# Patient Record
Sex: FEMALE | Race: WHITE | NOT HISPANIC OR LATINO | Employment: OTHER | URBAN - NONMETROPOLITAN AREA
[De-identification: names, ages, dates, MRNs, and addresses within clinical notes are randomized per-mention and may not be internally consistent; named-entity substitution may affect disease eponyms.]

---

## 2017-10-09 ENCOUNTER — OFFICE VISIT - GICH (OUTPATIENT)
Dept: FAMILY MEDICINE | Facility: OTHER | Age: 37
End: 2017-10-09

## 2017-10-09 ENCOUNTER — HISTORY (OUTPATIENT)
Dept: FAMILY MEDICINE | Facility: OTHER | Age: 37
End: 2017-10-09

## 2017-10-09 DIAGNOSIS — R25.2 CRAMP AND SPASM: ICD-10-CM

## 2017-10-09 DIAGNOSIS — E55.9 VITAMIN D DEFICIENCY: ICD-10-CM

## 2017-10-09 DIAGNOSIS — Z12.4 ENCOUNTER FOR SCREENING FOR MALIGNANT NEOPLASM OF CERVIX: ICD-10-CM

## 2017-10-09 DIAGNOSIS — Z00.00 ENCOUNTER FOR GENERAL ADULT MEDICAL EXAMINATION WITHOUT ABNORMAL FINDINGS: ICD-10-CM

## 2017-10-09 DIAGNOSIS — Z31.69 ENCOUNTER FOR OTHER GENERAL COUNSELING AND ADVICE ON PROCREATION (CODE): ICD-10-CM

## 2017-10-09 LAB
ANION GAP - HISTORICAL: 5 (ref 5–18)
BUN SERPL-MCNC: 18 MG/DL (ref 7–25)
BUN/CREAT RATIO - HISTORICAL: 17
CALCIUM SERPL-MCNC: 9.5 MG/DL (ref 8.6–10.3)
CHLORIDE SERPLBLD-SCNC: 106 MMOL/L (ref 98–107)
CO2 SERPL-SCNC: 24 MMOL/L (ref 21–31)
CREAT SERPL-MCNC: 1.03 MG/DL (ref 0.7–1.3)
GFR IF NOT AFRICAN AMERICAN - HISTORICAL: 60 ML/MIN/1.73M2
GLUCOSE SERPL-MCNC: 95 MG/DL (ref 70–105)
MAGNESIUM SERPL-MCNC: 1.9 MG/DL (ref 1.9–2.7)
POTASSIUM SERPL-SCNC: 3.8 MMOL/L (ref 3.5–5.1)
SODIUM SERPL-SCNC: 135 MMOL/L (ref 133–143)
T4 FREE SERPL-MCNC: 0.78 NG/DL (ref 0.58–1.64)
TSH - HISTORICAL: 1.9 UIU/ML (ref 0.34–5.6)
VITAMIN D TOTAL - HISTORICAL: 30.1 NG/ML

## 2017-10-13 ENCOUNTER — AMBULATORY - GICH (OUTPATIENT)
Dept: SCHEDULING | Facility: OTHER | Age: 37
End: 2017-10-13

## 2017-10-18 LAB — HPV RESULTS - HISTORICAL: NEGATIVE

## 2017-10-26 ENCOUNTER — OFFICE VISIT - GICH (OUTPATIENT)
Dept: OBGYN | Facility: OTHER | Age: 37
End: 2017-10-26

## 2017-10-26 ENCOUNTER — HISTORY (OUTPATIENT)
Dept: OBGYN | Facility: OTHER | Age: 37
End: 2017-10-26

## 2017-10-26 DIAGNOSIS — N97.9 FEMALE INFERTILITY: ICD-10-CM

## 2017-10-30 LAB — Lab: 3.1 NG/ML

## 2017-11-02 ENCOUNTER — HOSPITAL ENCOUNTER (OUTPATIENT)
Dept: RADIOLOGY | Facility: OTHER | Age: 37
End: 2017-11-02

## 2017-11-02 DIAGNOSIS — N97.9 FEMALE INFERTILITY: ICD-10-CM

## 2017-11-13 ENCOUNTER — AMBULATORY - GICH (OUTPATIENT)
Dept: RADIOLOGY | Facility: OTHER | Age: 37
End: 2017-11-13

## 2017-11-13 DIAGNOSIS — M25.559 PAIN IN HIP: ICD-10-CM

## 2017-11-13 DIAGNOSIS — M54.50 LOW BACK PAIN: ICD-10-CM

## 2017-11-14 ENCOUNTER — COMMUNICATION - GICH (OUTPATIENT)
Dept: OBGYN | Facility: OTHER | Age: 37
End: 2017-11-14

## 2017-11-22 ENCOUNTER — AMBULATORY - GICH (OUTPATIENT)
Dept: OBGYN | Facility: OTHER | Age: 37
End: 2017-11-22

## 2017-11-22 ENCOUNTER — COMMUNICATION - GICH (OUTPATIENT)
Dept: OBGYN | Facility: OTHER | Age: 37
End: 2017-11-22

## 2017-11-22 DIAGNOSIS — N97.9 FEMALE INFERTILITY: ICD-10-CM

## 2017-11-27 ENCOUNTER — HOSPITAL ENCOUNTER (OUTPATIENT)
Dept: RADIOLOGY | Facility: OTHER | Age: 37
End: 2017-11-27

## 2017-11-27 DIAGNOSIS — M25.559 PAIN IN HIP: ICD-10-CM

## 2017-11-27 DIAGNOSIS — M54.50 LOW BACK PAIN: ICD-10-CM

## 2017-12-04 ENCOUNTER — AMBULATORY - GICH (OUTPATIENT)
Dept: OBGYN | Facility: OTHER | Age: 37
End: 2017-12-04

## 2017-12-04 ENCOUNTER — HOSPITAL ENCOUNTER (OUTPATIENT)
Dept: RADIOLOGY | Facility: OTHER | Age: 37
End: 2017-12-04

## 2017-12-04 DIAGNOSIS — N97.0 FEMALE INFERTILITY ASSOCIATED WITH ANOVULATION: ICD-10-CM

## 2017-12-04 DIAGNOSIS — N97.9 FEMALE INFERTILITY: ICD-10-CM

## 2017-12-05 ENCOUNTER — OFFICE VISIT - GICH (OUTPATIENT)
Dept: OBGYN | Facility: OTHER | Age: 37
End: 2017-12-05

## 2017-12-05 ENCOUNTER — HISTORY (OUTPATIENT)
Dept: OBGYN | Facility: OTHER | Age: 37
End: 2017-12-05

## 2017-12-05 DIAGNOSIS — N97.9 FEMALE INFERTILITY: ICD-10-CM

## 2017-12-27 ENCOUNTER — HISTORY (OUTPATIENT)
Dept: FAMILY MEDICINE | Facility: OTHER | Age: 37
End: 2017-12-27

## 2017-12-27 ENCOUNTER — OFFICE VISIT - GICH (OUTPATIENT)
Dept: FAMILY MEDICINE | Facility: OTHER | Age: 37
End: 2017-12-27

## 2017-12-27 ENCOUNTER — AMBULATORY - GICH (OUTPATIENT)
Dept: OBGYN | Facility: OTHER | Age: 37
End: 2017-12-27

## 2017-12-27 DIAGNOSIS — O36.80X0 PREGNANCY WITH INCONCLUSIVE FETAL VIABILITY: ICD-10-CM

## 2017-12-27 DIAGNOSIS — Z32.01 ENCOUNTER FOR PREGNANCY TEST, RESULT POSITIVE: ICD-10-CM

## 2017-12-27 DIAGNOSIS — N91.2 AMENORRHEA: ICD-10-CM

## 2017-12-27 LAB — HCG UR QL: POSITIVE

## 2017-12-27 NOTE — PROGRESS NOTES
Patient Information     Patient Name MRN Sex GIGI    Ann Lopez [id number] Female 1980      Progress Notes by Ann Aguilar at 2017  7:13 AM     Author:  Ann Aguilar Service:  (none) Author Type:  Other Clinical Staff     Filed:  2017  7:13 AM Date of Service:  2017  7:13 AM Status:  Signed     :  Ann Aguilar (Other Clinical Staff)            Falls Risk Criteria:    Age 65 and older or under age 4        Sensory deficits    Poor vision    Use of ambulatory aides    Impaired judgment    Unable to walk independently    Meets High Risk criteria for falls:  no

## 2017-12-27 NOTE — PROGRESS NOTES
Patient Information     Patient Name MRN Sex Ann Escobedo [id number] Female 1980      Progress Notes by Tanesha Johnson MD at 10/9/2017 11:15 AM     Author:  Tanesha Johnson MD Service:  (none) Author Type:  Physician     Filed:  10/9/2017  1:37 PM Encounter Date:  10/9/2017 Status:  Signed     :  Tanesha Johnson MD (Physician)            SUBJECTIVE:    Ann Lopez is a 37 y.o. female who presehts for her annual exam.  Nursing Notes:   Cristel Rea  10/9/2017 11:55 AM  Signed  Patient presents to the clinic today for a px. She has some infertility questions.    Cristel Rea ....................  10/9/2017   11:47 AM        HPI: Ann Lopez is a 37 y.o. female presents for her annual exam.    She's wondering about the next steps in regards to infertility. She was  2016. She has not ever been pregnant. When seen last year, labs were done which were unremarkable. She also underwent HSG which was normal She has light periods, last about 3 days.  Periods are pretty regular. She is taking folic acid Evaluation including labs, HSG and 's semen analysis have been normal.   She is an avid runner.  FH:  Sister with infertility, has conceived and carried up to 6 weeks.      Her hip flexors are continuing to be tight - started with marathon training.  She had seen chiropractor and PT prior to this.     Last pap:   Immunizations:  Flu vaccine declined   Mammogram at age 45  Colon cancer screening at age 50  Current birth control none  Last Lipids:  Chol: 155    3/27/2013  T    3/27/2013  HDL:   65    3/27/2013  LDL:  78    3/27/2013    PROBLEM LIST:  Patient Active Problem List     Diagnosis  Code     Vitamin D deficiency E55.9     Encounter for infertility counseling Z31.69     PAST MEDICAL HISTORY:  Past Medical History:     Diagnosis  Date     Dysmenorrhea 2006     Herpes simplex with unspecified complication 2004     genitial      Stress fracture 2000    left hip      Vitamin D deficiency 10/5/2016     SURGICAL HISTORY:  Past Surgical History:      Procedure  Laterality Date     REFRACTIVE SURGERY  2012       SOCIAL HISTORY:  Social History     Social History        Marital status:       Spouse name: Ralf     Number of children:  N/A     Years of education:  12+     Occupational History       health and dietary       Social History Main Topics          Smoking status:   Never Smoker      Smokeless tobacco:   Never Used      Alcohol use   Yes      Comment: social       Drug use:   No      Sexual activity:   Yes      Partners:  Male      Birth control/ protection:  None      Other Topics  Concern     Not on file      Social History Narrative      in April 2016      Originally from [place]      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        FAMILY HISTORY:  Family History       Problem   Relation Age of Onset     Good Health  Mother      Alcoholism  Father      Thyroid Disease  Sister      borderline; infertility; 3 yrs younger       Thyroid Disease  Other      father's side of the family        CURRENT MEDICATIONS:   No current outpatient prescriptions on file.     No current facility-administered medications for this visit.      Medications have been reviewed by me and are current to the best of my knowledge and ability.    ALLERGIES:  Review of patient's allergies indicates no known allergies.     REVIEW OF SYSTEMS:  General: denies any general problems.  Eyes: denies problems  Ears/Nose/Throat: denies problems, last dentist visit was this summer  Respiratory: denies problems  Cardiovascular: denies problems  Gastrointestinal: denies problems  Genitourinary: denies problems  Musculoskeletal: hip issues with increased distance running training   Skin: denies problems  Neurologic: denies problems  Psychiatric: denies problems  Endocrine: denies problems  Heme/Lymphatic:  "denies problems  Allergic/Immunologic: denies problems    PHQ Depression Screening 10/9/2017   Date of PHQ exam (doc flow) 10/9/2017   1. Lack of interest/pleasure 0 - Not at all   2. Feeling down/depressed 0 - Not at all   PHQ-2 TOTAL SCORE 0   Some recent data might be hidden       OBJECTIVE:  /80  Pulse 52  Ht 1.822 m (5' 11.75\")  Wt 79.8 kg (176 lb)  LMP 09/29/2017  Breastfeeding? No  BMI 24.04 kg/m2  EXAM:   General Appearance: Pleasant, alert, appropriate appearance for age. No acute distress  Head Exam: Normal. Normocephalic, atraumatic.  Eye Exam:  Normal external eye, conjunctiva, lids, cornea. ROBERTO.  Ear Exam: Normal TM's bilaterally. Normal auditory canals and external ears. Non-tender.  Nose Exam: Normal external nose, mucus membranes, and septum.  OroPharynx Exam:  Dental hygiene adequate. Normal buccal mucose. Normal pharynx.  Neck Exam:  Supple, no masses or nodes.  Thyroid Exam: No nodules or enlargement.  Chest/Respiratory Exam: Normal chest wall and respirations. Clear to auscultation.  Breast Exam: No dimpling, nipple retraction or discharge. No masses or nodes.  Cardiovascular Exam: Regular rate and rhythm. S1, S2, no murmur, click, gallop, or rubs.  Gastrointestinal Exam: Soft, non-tender, no masses or organomegaly  Genitourinary Exam Female: External genitalia, vulva and vagina appear normal. Cervical os small - difficult to insert cytobrush; pap obtained   Lymphatic Exam: Non-palpable nodes in neck, clavicular, axillary, or inguinal regions.  Musculoskeletal Exam: decreased hip ROM  Foot Exam: Left and right foot: good pedal pulses, no lesions, nail hygiene good.  Skin: no rash or abnormalities  Neurologic Exam: Nonfocal, symmetric DTRs, normal gross motor, tone coordination and no tremor.  Psychiatric Exam: Alert and oriented - appropriate affect.    Results for orders placed or performed in visit on 10/09/17       BASIC METABOLIC PANEL       Result  Value Ref Range Status    " SODIUM 135 133 - 143 mmol/L Final    POTASSIUM 3.8 3.5 - 5.1 mmol/L Final    CHLORIDE 106 98 - 107 mmol/L Final    CO2,TOTAL 24 21 - 31 mmol/L Final    ANION GAP 5 5 - 18                 Final    GLUCOSE 95 70 - 105 mg/dL Final    CALCIUM 9.5 8.6 - 10.3 mg/dL Final    BUN 18 7 - 25 mg/dL Final    CREATININE 1.03 0.70 - 1.30 mg/dL Final    BUN/CREAT RATIO           17                 Final    GFR if African American >60 >60 ml/min/1.73m2 Final    GFR if not African American 60 (L) >60 ml/min/1.73m2 Final   MAGNESIUM       Result  Value Ref Range Status    MAGNESIUM 1.9 1.9 - 2.7 mg/dL Final       ASSESSMENT/PLAN    ICD-10-CM    1. Physical exam, annual Z00.00    2. Encounter for infertility counseling Z31.69 AMB CONSULT TO OB-GYN      TSH      T4,FREE      TSH      T4,FREE      CANCELED: XR HYSTEROSALPINGOGRAM      CANCELED: US PELVIS COMPLETE TA AND TV   3. Screening for cervical cancer Z12.4 GYN THIN PREP PAP SCREEN IMAGED      GYN THIN PREP PAP SCREEN IMAGED   4. Muscle cramps R25.2 VITAMIN D 25 (DEFICIENCY)      BASIC METABOLIC PANEL      MAGNESIUM      VITAMIN D 25 (DEFICIENCY)      BASIC METABOLIC PANEL      MAGNESIUM   5. Vitamin D deficiency E55.9 VITAMIN D 25 (DEFICIENCY)      BASIC METABOLIC PANEL      MAGNESIUM      VITAMIN D 25 (DEFICIENCY)      BASIC METABOLIC PANEL      MAGNESIUM       Ms. Palm blood pressure is in the normal range for adults. Per JNC-8 guidelines normal adult blood pressure is < 120/80, pre-hypertensive is between 120/80 and 139/89, and hypertension is 140/90 or greater.  Normal wt/BMI for her age.  1.  Pessary and HPV testing obtained today.  2. Repeat vitamin D testing along with basic metabolic panel magnesium level.  3. Flu vaccine offered, refused today.  4. Discussed referral to OB/GYN for infertility treatment. Based on patient's history, she may need Clomid plus estrogen, (based on her history of very light periods.)  Discussed possible IUI, but this will be discussion to be  held with RICARDO.    Tanesha Johnson MD

## 2017-12-27 NOTE — PROGRESS NOTES
Patient Information     Patient Name MRN Sex Ann Escobedo [id number] Female 1980      Progress Notes by Sona Newman MD at 10/26/2017  1:15 PM     Author:  Sona Newman MD Service:  (none) Author Type:  Physician     Filed:  10/26/2017  2:43 PM Encounter Date:  10/26/2017 Status:  Signed     :  Sona Newman MD (Physician)            CC: infertility consult     HPI: Ann Lopez  presenting for primary infertility evaluation, desire for conception. She presents with her  Juan Lopez.     Menstrual history:  Patient's last menstrual period was 10/26/2017.   Menses are regular q 28 days, lasting 3-4 days. Light flow- using 3-4 tampons on heaviest day. Cramping during her menses.  Cyclic symptoms include  mid-cycle breast tenderness.     The patient has been trying to conceive for 1.5 years.    [place] history: attempting timed intercourse based on the fertility window on her menstrual roe, usually intercourse 1-2 times during this week.    Prior pregnancy history is as follows: G0    Allergies: Review of patient's allergies indicates no known allergies.                    Past Medical History:     Diagnosis  Date     Dysmenorrhea 2006     Herpes simplex with unspecified complication 2004    genitial      Stress fracture     left hip      Vitamin D deficiency 10/5/2016       Past Surgical History:      Procedure  Laterality Date     REFRACTIVE SURGERY             Social History       Substance Use Topics         Smoking status:   Never Smoker     Smokeless tobacco:   Never Used     Alcohol use   Yes      Comment: social     Works as a massage therapist, nutrition . Lives with her     Family History       Problem   Relation Age of Onset     Good Health  Mother      Alcoholism  Father      Thyroid Disease  Sister      borderline; infertility; 3 yrs younger       Thyroid Disease  Other      father's side of the family    "  Sister has a history of miscarriage x1 at 6 wks, many years of infertility, borderline hypothyroidism    Meds: vitamin D, omega 3, glucosamine      Past GYN history:  STD: denies  Last PAP smear:   10/2017 NILM  History of abnormal PAP: No  History of surgery to cervix: No  PID History: No  Galactorrhea: No  GABRIELLA exposure: No  Hirsuitism: No  Intolerance to hot or cold: No  Significant change in weight within last year: No      PAST INFERTILITY EVALUATION AND TREATMENT:  Patient's work up has included:  Ultrasound: not done  HSG:Yes on 16, findings were normal  Laparoscopy: No      Hormonal evaluation has included:   TSH normal on 10/9/17  Semen Analysis on partner was normal  Had a previous estradiol, progesterone that were not Day 3  DHEA-S normal  Testosterone normal    Past infertility treatment included none.    Partner is Juan. No history of trauma to the genitalia, medications, tobacco, drug use. No prior children. Works in law enforcement. Only medications are asthma, advair, triamcinolone ointment.    Objective:  /70  Pulse 60  Ht 1.82 m (5' 11.65\")  Wt 80.9 kg (178 lb 6.4 oz)  LMP 10/26/2017  BMI 24.43 kg/m2    Constitutional: Healthy appearing female, no acute distress  Resp: nonlabored  Psychiatric: mentation appears normal and affect normal    ASSESSMENT/ PLAN:  37 y.o.  presenting with primary infertility of 1.5 years. Reviewed previous evaluation- including normal HSG and semen analysis, DHEA-S, testosterone. She had estradiol and progesterone done last year but this was not done on Day 3. She has not had an AMH or pelvic ultrasound- ord'd today. Suspect ultrasound will be normal based on HSG results. Briefly reviewed options for infertility treatments- including immediate referral to [facility] based on her age vs clomid + IUI. Reviewed chances of successful pregnancy with each method. She and her  will discuss options and contact insurance company about treatment coverage. " Will call patient with testing results and make a plan going forward.     30 total minutes spent with the patient with >50% of the time spent counseling the patient on infertility     Sona Newman MD  OB/GYN  10/26/2017 2:22 PM

## 2017-12-28 NOTE — TELEPHONE ENCOUNTER
Patient Information     Patient Name MRN Ann De La Garza [id number] Female 1980      Telephone Encounter by Marilee Malcolm RN at 2017 12:58 PM     Author:  Marilee Malcolm RN Service:  (none) Author Type:  NURS- Registered Nurse     Filed:  2017  1:08 PM Encounter Date:  2017 Status:  Signed     :  Marilee Malcolm RN (NURS- Registered Nurse)            Per discussion with Dr Sona Newman MD, patient should start clomid on day 4-8 of cycle, have ultrasound on 2017 with ovidrel that day if appropriate. Will then have IUI the next morning 2017 with Dr Sona Newman MD.    Marilee Malcolm RN............. 2017 12:59 PM

## 2017-12-28 NOTE — PROGRESS NOTES
Patient Information     Patient Name MRN Sex Ann Escobedo [id number] Female 1980      Progress Notes by Janet Lino R.T. ([facility]) at 2017 12:05 PM     Author:  [facility] Service:  (none) Author Type:  [employer] - Registered Radiologic Technologist     Filed:  2017 12:05 PM Date of Service:  2017 12:05 PM Status:  Signed     :  Janet Lino R.T. ([facility]) ([facility] - Registered Radiologic Technologist)            Falls Risk Criteria:    Age 65 and older or under age 4        Sensory deficits    Poor vision    Use of ambulatory aides    Impaired judgment    Unable to walk independently    Meets High Risk criteria for falls:  no

## 2017-12-28 NOTE — ADDENDUM NOTE
Patient Information     Patient Name MRN Ann De La Garza [id number] Female 1980      Addendum Note by Steph Martinez at 10/16/2017 11:10 AM     Author:  Steph Martinez Service:  (none) Author Type:  (none)     Filed:  10/16/2017 11:10 AM Encounter Date:  10/9/2017 Status:  Signed     :  Steph Martinez       Addended by: STEPH MARTINEZ on: 10/16/2017 11:10 AM        Modules accepted: Orders

## 2017-12-28 NOTE — TELEPHONE ENCOUNTER
Patient Information     Patient Name MRN Ann De La Garza [id number] Female 1980      Telephone Encounter by Marilee Malcolm RN at 2017 10:49 AM     Author:  Marilee Malcolm RN Service:  (none) Author Type:  NURS- Registered Nurse     Filed:  2017 11:00 AM Encounter Date:  2017 Status:  Signed     :  Marilee Malcolm RN (NURS- Registered Nurse)            Patient is calling as she is currently Day one of her cycle. Patient has decided to go ahead with clomid 50 mg on days 3-7, follicle study on days 10-12, ovidrel same day as appropriate ultrasound, and IUI 24 hours following.    Day one: 2017  Day three: 2017  Day ten: 2017    Patient is wondering if she needs an ultrasound to visualize ovaries prior to initiation of clomid. Please advise.    Orders placed and pended for review.  Marilee Malcolm RN............. 2017 10:54 AM

## 2017-12-28 NOTE — TELEPHONE ENCOUNTER
Patient Information     Patient Name MRN Sex Ann Escobedo [id number] Female 1980      Telephone Encounter by Sona Newman MD at 2017 12:33 PM     Author:  Sona Newman MD Service:  (none) Author Type:  Physician     Filed:  2017 12:33 PM Encounter Date:  2017 Status:  Signed     :  Sona Newman MD (Physician)            Had recent ultrasound that demonstrated no ovarian cysts so no repeat ultrasound required. Clomid and ovidrel orders signed.  Sona Newman MD  OB/GYN  2017 12:33 PM

## 2017-12-30 NOTE — NURSING NOTE
Patient Information     Patient Name MRAnn Sheehan [id number] Female 1980      Nursing Note by Cristel Rea at 10/9/2017 11:15 AM     Author:  Cristel Rea Service:  (none) Author Type:  (none)     Filed:  10/9/2017 11:55 AM Encounter Date:  10/9/2017 Status:  Signed     :  Cristel Rea            Patient presents to the clinic today for a px. She has some infertility questions.    Cristel Rea ....................  10/9/2017   11:47 AM

## 2017-12-30 NOTE — NURSING NOTE
Patient Information     Patient Name MRN Ann De La Garza [id number] Female 1980      Nursing Note by Zahra Panchal at 10/26/2017  1:15 PM     Author:  Zahra Panchal Service:  (none) Author Type:  (none)     Filed:  10/26/2017  1:33 PM Encounter Date:  10/26/2017 Status:  Signed     :  Zahra Panchal            Patient here with spouse to discuss fertility plan. Has been trying for 1.5 years to get pregnant.  Zahra Panchal LPN..............................10/26/2017  1:18 PM

## 2018-01-09 ENCOUNTER — PRENATAL OFFICE VISIT - GICH (OUTPATIENT)
Dept: OBGYN | Facility: OTHER | Age: 38
End: 2018-01-09

## 2018-01-09 ENCOUNTER — HISTORY (OUTPATIENT)
Dept: OBGYN | Facility: OTHER | Age: 38
End: 2018-01-09

## 2018-01-09 ENCOUNTER — HOSPITAL ENCOUNTER (OUTPATIENT)
Dept: RADIOLOGY | Facility: OTHER | Age: 38
End: 2018-01-09

## 2018-01-09 ENCOUNTER — AMBULATORY - GICH (OUTPATIENT)
Dept: OBGYN | Facility: OTHER | Age: 38
End: 2018-01-09

## 2018-01-09 DIAGNOSIS — O36.80X0 PREGNANCY WITH INCONCLUSIVE FETAL VIABILITY: ICD-10-CM

## 2018-01-09 DIAGNOSIS — O09.519 SUPERVISION OF ELDERLY PRIMIGRAVIDA: ICD-10-CM

## 2018-01-09 LAB
ABORH - HISTORICAL: NORMAL
ABSOLUTE BASOPHILS - HISTORICAL: 0 THOU/CU MM
ABSOLUTE EOSINOPHILS - HISTORICAL: 0.2 THOU/CU MM
ABSOLUTE IMMATURE GRANULOCYTES(METAS,MYELOS,PROS) - HISTORICAL: 0 THOU/CU MM
ABSOLUTE LYMPHOCYTES - HISTORICAL: 1.7 THOU/CU MM (ref 0.9–2.9)
ABSOLUTE MONOCYTES - HISTORICAL: 0.4 THOU/CU MM
ABSOLUTE NEUTROPHILS - HISTORICAL: 3.2 THOU/CU MM (ref 1.7–7)
ANTIBODY SCREEN - HISTORICAL: NEGATIVE
BASOPHILS # BLD AUTO: 0.7 %
EOSINOPHIL NFR BLD AUTO: 3.2 %
ERYTHROCYTE [DISTWIDTH] IN BLOOD BY AUTOMATED COUNT: 11.9 % (ref 11.5–15.5)
HCT VFR BLD AUTO: 37.1 % (ref 33–51)
HEMOGLOBIN: 12.6 G/DL (ref 12–16)
IMMATURE GRANULOCYTES(METAS,MYELOS,PROS) - HISTORICAL: 0.5 %
LYMPHOCYTES NFR BLD AUTO: 30.6 % (ref 20–44)
MCH RBC QN AUTO: 33.2 PG (ref 26–34)
MCHC RBC AUTO-ENTMCNC: 34 G/DL (ref 32–36)
MCV RBC AUTO: 98 FL (ref 80–100)
MONOCYTES NFR BLD AUTO: 7.5 %
NEUTROPHILS NFR BLD AUTO: 57.5 % (ref 42–72)
PLATELET # BLD AUTO: 167 THOU/CU MM (ref 140–440)
PMV BLD: 11.7 FL (ref 6.5–11)
RED BLOOD COUNT - HISTORICAL: 3.8 MIL/CU MM (ref 4–5.2)
RUBELLA COMMENT - HISTORICAL: NORMAL
SPECIMEN EXPIRATION DATE/TIME - HISTORICAL: NORMAL
WHITE BLOOD COUNT - HISTORICAL: 5.6 THOU/CU MM (ref 4.5–11)

## 2018-01-10 LAB
HBSAG CATEGORY - HISTORICAL: NONREACTIVE
HIV-1/HIV-2 ANTIBODY CATEGORY - HISTORICAL: NORMAL

## 2018-01-11 LAB
CULTURE - HISTORICAL: NORMAL
TREPONEMA PALLIDUM - HISTORICAL: NEGATIVE

## 2018-01-18 PROBLEM — Z31.69 ENCOUNTER FOR INFERTILITY COUNSELING: Status: ACTIVE | Noted: 2017-10-08

## 2018-01-25 VITALS
WEIGHT: 176 LBS | HEART RATE: 52 BPM | SYSTOLIC BLOOD PRESSURE: 106 MMHG | BODY MASS INDEX: 23.84 KG/M2 | HEIGHT: 72 IN | DIASTOLIC BLOOD PRESSURE: 80 MMHG

## 2018-01-25 VITALS
HEART RATE: 60 BPM | BODY MASS INDEX: 24.16 KG/M2 | HEIGHT: 72 IN | WEIGHT: 178.4 LBS | SYSTOLIC BLOOD PRESSURE: 120 MMHG | DIASTOLIC BLOOD PRESSURE: 70 MMHG

## 2018-02-07 ENCOUNTER — PRENATAL OFFICE VISIT - GICH (OUTPATIENT)
Dept: OBGYN | Facility: OTHER | Age: 38
End: 2018-02-07

## 2018-02-07 ENCOUNTER — HISTORY (OUTPATIENT)
Dept: OBGYN | Facility: OTHER | Age: 38
End: 2018-02-07

## 2018-02-07 DIAGNOSIS — O09.519 SUPERVISION OF ELDERLY PRIMIGRAVIDA: ICD-10-CM

## 2018-02-09 VITALS
WEIGHT: 177.8 LBS | HEIGHT: 72 IN | SYSTOLIC BLOOD PRESSURE: 122 MMHG | DIASTOLIC BLOOD PRESSURE: 78 MMHG | BODY MASS INDEX: 24.08 KG/M2 | TEMPERATURE: 97.1 F

## 2018-02-09 VITALS
BODY MASS INDEX: 24.54 KG/M2 | WEIGHT: 179.2 LBS | DIASTOLIC BLOOD PRESSURE: 68 MMHG | SYSTOLIC BLOOD PRESSURE: 122 MMHG | HEART RATE: 76 BPM

## 2018-02-09 VITALS
BODY MASS INDEX: 25.47 KG/M2 | WEIGHT: 186 LBS | HEART RATE: 80 BPM | DIASTOLIC BLOOD PRESSURE: 68 MMHG | SYSTOLIC BLOOD PRESSURE: 106 MMHG

## 2018-02-09 VITALS
BODY MASS INDEX: 24.65 KG/M2 | WEIGHT: 182 LBS | DIASTOLIC BLOOD PRESSURE: 58 MMHG | HEART RATE: 76 BPM | HEIGHT: 72 IN | SYSTOLIC BLOOD PRESSURE: 106 MMHG

## 2018-02-12 NOTE — NURSING NOTE
Patient Information     Patient Name MRN Ann De La Garza [id number] Female 1980      Nursing Note by Marilee Malcolm RN at 2017  3:45 PM     Author:  Marilee Malcolm RN Service:  (none) Author Type:  NURS- Registered Nurse     Filed:  2017  4:44 PM Encounter Date:  2017 Status:  Signed     :  Marilee Malcolm RN (NURS- Registered Nurse)            Patient is here for intrauterine insemination.  Marilee Malcolm RN............. 2017 3:54 PM

## 2018-02-12 NOTE — PROGRESS NOTES
Patient Information     Patient Name MRN Sex Ann Escobedo [id number] Female 1980      Progress Notes by Марина Ramírez at 2017  7:31 AM     Author:  Марина Ramírez Service:  (none) Author Type:  Other Clinical Staff     Filed:  2017  7:47 AM Date of Service:  2017  7:31 AM Status:  Signed     :  Марина Ramírez (Other Clinical Staff)            Falls Risk Criteria:    Age 65 and older or under age 4        Sensory deficits    Poor vision    Use of ambulatory aides    Impaired judgment    Unable to walk independently    Meets High Risk criteria for falls:  No

## 2018-02-12 NOTE — NURSING NOTE
Patient Information     Patient Name MRN Ann De La Garza [id number] Female 1980      Nursing Note by Glenny House at 2017  8:30 AM     Author:  Glenny House Service:  (none) Author Type:  (none)     Filed:  2017  8:47 AM Encounter Date:  2017 Status:  Signed     :  Glenny House            Patient presents to the clinic for pregnancy confirmation she has had a positive at home test.  Glenny House LPN........................2017  8:34 AM

## 2018-02-12 NOTE — NURSING NOTE
Patient Information     Patient Name MRN Sex Ann Escobedo [id number] Female 1980      Nursing Note by Marilee Malcolm RN at 2017  9:01 AM     Author:  Marilee Malcolm RN Service:  (none) Author Type:  NURS- Registered Nurse     Filed:  2017  9:03 AM Encounter Date:  2017 Status:  Signed     :  Marilee Malcolm RN (NURS- Registered Nurse)            Patient was seen in clinic for a pregnancy confirmation - will need ultrasound and initial OB appointment between 6-7 weeks gestation.  Marilee Malcolm RN............. 2017 9:03 AM

## 2018-02-12 NOTE — PATIENT INSTRUCTIONS
Patient Information     Patient Name MRN Ann De La Garza [id number] Female 1980      Patient Instructions by Ann Isaac PA-C at 2017  8:58 AM     Author:  Ann Isaac PA-C  Service:  (none) Author Type:  PHYS- Physician Assistant     Filed:  2017  9:04 AM  Encounter Date:  2017 Status:  Addendum     :  Ann Isaac PA-C (PHYS- Physician Assistant)        Related Notes: Original Note by Ann Isaac PA-C (PHYS- Physician Assistant) filed at 2017  8:59 AM            Return at 6 weeks for pregnancy check.      -Take prenatal multivitamin daily.    -Encouraged increase of fluids and a balanced diet.    -Encouraged routine exercise.    -Abstain from alcohol, smoking and illicit drug use.   -Avoid taking medications like ibuprofen, advil, aspirin and aleve. You may take tylenol for pain.   -Limit caffeine consumption to less than 200 to 300 mg per day .    Your estimated due date is: 2018  You are currently 5 weeks 0 days along.         Index [place]   Eating Healthy Foods When You Are Pregnant: Brief Version   ________________________________________________________________________  KEY POINTS    When you are pregnant, you need to eat healthy foods to help you and your growing baby stay healthy.    Eat a variety of fruits, vegetables, whole grains, milk products, and proteins. Drink plenty of water too.    Try to limit fish that may have mercury, and limit caffeine.    Don t drink alcohol or use illegal drugs.  ________________________________________________________________________  When you are pregnant, you need to eat healthy foods to help you and your growing baby stay healthy. Eating right can also help you feel better.   The best time to start eating a healthy, balanced diet is before you get pregnant.  If it is hard for you to afford healthy foods, talk to your healthcare provider about it. He or she may know about government programs that can  help.  What foods do I need to eat?   What you eat gives your baby what he or she needs to grow. When you eat healthy foods, you give your baby strong bones and teeth, healthy skin, and a healthy body. Eating right keeps you healthy, too.  Here's what you should eat every day.    Protein: 5 and 1/2 to 7 ounces a day    Grains: 6 to 8 ounces every day (Eat less processed food and more whole grains.)    Fruits: 2 cups every day    Vegetables: 2 and 1/2 to 3 cups every day    Dairy (Milk) Products: 3 cups every day (It s best to choose low-fat or nonfat dairy products.)    Healthy Fats: Eat fats, such as canola and olive oils, avocado, soft (no trans-fat) margarines, mayonnaise, and oil based salad dressings in moderation.    Liquids: Try to drink at least 8 cups of liquid each day. Water is best. All drinks should be low in fat, sugar, and caffeine.  Your healthcare provider will most likely prescribe prenatal vitamins. This will help make sure you get the vitamins and minerals you need.  What can I do if I'm having trouble eating?   If you have nausea or vomiting, it may help to:    Eat crackers, pretzels, or dry cereal before you get up in the morning.    Eat small meals often.    Stay away from greasy, fried, or spicy foods.  See your healthcare provider if you can't keep anything down.  If you are constipated, it may help to eat more fresh fruits, vegetables, high-fiber breads, and cereals. Do not use laxatives unless your healthcare provider tells you to.  If you have diarrhea, it may help to:    Eat yogurt, rice, dry toast, applesauce, or bananas.    Ask your healthcare provider about taking medicine for diarrhea.  If you get heartburn, it may help to:    Eat 5 or 6 small meals a day instead of 2 or 3 large meals.    Eat less spicy or fatty food.    Bake or broil your food instead of frying it.    Stay away from orange juice or grapefruit juice. Instead, drink water, milk, apple juice, or cranberry juice.    Not  lie down for 1 to 2 hours after you eat.    Ask your healthcare provider if you can use an antacid.  Are there things I should not eat, drink, or use when I am pregnant?   To keep healthy and have a healthy baby:    Stay away from wine, beer, and liquor.    Don t use tobacco or drugs.    Check with your healthcare provider before you take any medicine.    Use less caffeine. Caffeine is in soft drinks, chocolate, coffee, and some kinds of tea. Some doctors say you should not have any caffeine during the first 3 months of your pregnancy. They also say that you should have no more caffeine than is in one 12-oz cup of regular brewed coffee during the rest of your pregnancy.    Don t eat or drink:    Any foods made with unpasteurized milk. (Read labels, especially on packages of soft cheese.)    Raw sprouts.    Meat, fish, shellfish, or eggs that are raw or undercooked.    Shark, swordfish, fredrick mackerel, or tilefish (also called snapper).    Each week don t eat more than:    6 ounces of canned white (albacore) tuna, tuna steak, or halibut    A total of 12 ounces of fish  The best choices of fish are shrimp, pollock, salmon, cod, catfish, or light canned tuna.    Keep following this advice while you are breast-feeding your baby.  If you don t eat meat or you have a health problem like diabetes, be sure to talk to your healthcare provider about your diet.  Developed by [employer].  Adult Advisor 2017.2 published by [employer].  Last modified: 2016-11-21  Last reviewed: 2016-11-17  This content is reviewed periodically and is subject to change as new health information becomes available. The information is intended to inform and educate and is not a replacement for medical evaluation, advice, diagnosis or treatment by a healthcare professional.  References   Adult Advisor 2017.2 Index    Copyright   2017 [employer], a division of [employer] All rights reserved.        Index [place]   Morning Sickness    ________________________________________________________________________  KEY POINTS    Morning sickness is stomach upset, nausea, or vomiting that often happens during pregnancy.    Treatment can include medicine and IV fluids prescribed by your healthcare provider to reduce nausea and vomiting.    Taking small sips of fluids, sucking on ice chips or Popsicles, or sipping peppermint tea may help with replacing fluids.  ________________________________________________________________________  What is morning sickness?  Morning sickness is stomach upset, nausea, or vomiting that often happens during pregnancy. Nausea is a feeling of stomach upset and often is the feeling you have just before you vomit. Vomiting is throwing up food and fluid from the stomach through the mouth. Sometimes you may feel nausea without vomiting. Many pregnant women have morning sickness during the first 3 months of pregnancy. Morning sickness can happen any time of the day.  If you have severe morning sickness, it can cause problems for you and the baby. You could lose weight and lose too much fluid from your body. You and the baby may not get enough nutrients, or your body s chemicals may get out of balance.  What is the cause?  It is not well understood why some women have morning sickness and others do not. Women with high levels of pregnancy hormones tend to have morning sickness. It is also more common when a woman is pregnant with more than 1 baby, like with twins.  Morning sickness tends to be worse during your first pregnancy. It usually goes away by about the 14th week of pregnancy, when the level of pregnancy hormones gets lower.  What are the symptoms?  Mild symptoms include nausea, queasy stomach, and vomiting 1 to 2 times a day. Symptoms of severe morning sickness may include:    Repeated vomiting soon after you eat or drink anything, including water    Weight loss    Dark-colored urine    Dizziness or lightheadedness  How  is it diagnosed?  Your healthcare provider will ask about your symptoms and medical history and examine you. Tests may include:    Blood tests    Urine test  If you have severe morning sickness, you may need other tests or scans.  How is it treated?  Severe morning sickness may be treated with:    Medicine prescribed by your healthcare provider to reduce nausea and vomiting    Staying at the hospital and getting IV fluids instead of eating or drinking anything for 1 to 3 days, then slowly adding liquids and food back into your diet  How can I take care of myself?  Things that you can do that might help relieve morning sickness are:    Eat snacks that are high in protein, such as cheese, a glass of milk, or low-fat yogurt.    Eat small meals often (4 to 6 times a day) instead of 2 or 3 larger meals.    Avoid strong odors and greasy or spicy foods.    Eat more foods with vitamin B6, such as green, leafy vegetables.    Eat dry toast or crackers or a couple slices of apple before you get out of bed. Movement on an empty stomach often makes morning sickness worse.    Wear acupressure wrist bands, like the wrist bands used for motion sickness.    Take vitamin B6 pills or get B6 shots if recommended by your provider.  A dietitian can help you plan a way to eat a balanced diet.  Because you are losing fluids when you throw up, it s important to drink fluids. Even if liquids stay down just an hour, your body will absorb a lot in that time. Try sucking on ice chips or Popsicles. Take small sips often rather than drinking a whole glass of fluid at once. Some women find that drinking small sips of peppermint tea, ginger tea, or ginger ale helps their symptoms.  Hypnosis or acupuncture may help. Check with your healthcare provider before you try any natural remedies or alternative treatments.  Developed by [employer].  Adult Advisor 2017.2 published by [employer].  Last modified: 2015-07-17  Last reviewed: 2015-07-17  This  content is reviewed periodically and is subject to change as new health information becomes available. The information is intended to inform and educate and is not a replacement for medical evaluation, advice, diagnosis or treatment by a healthcare professional.  References   Adult Advisor 2017.2 Index    Copyright   2017 [employer], a division of [employer] All rights reserved.

## 2018-02-12 NOTE — PROGRESS NOTES
Patient Information     Patient Name MRN Sex     Ann Lopez [id number] Female 1980      Progress Notes by Sona Newman MD at 2017  3:45 PM     Author:  Sona Newman MD Service:  (none) Author Type:  Physician     Filed:  2017  4:44 PM Encounter Date:  2017 Status:  Signed     :  Sona Newman MD (Physician)            [facility]  Procedure Visit    S: Ms. Ann Lopez is a 37 y.o.  here for IUI for for unexplained infertility. She has been taking clomid days 3-7. Ultrasound  with one 16mm follicle on right and she received an ovidrel injection.     O:  /78  Temp 97.1  F (36.2  C) (Tympanic)   Ht 1.829 m (6')  Wt 80.6 kg (177 lb 12.8 oz)  LMP 2017 (Exact Date)  BMI 24.11 kg/m2  Gen: Well-appearing, NAD  Resp: nonlabored    Timeout was performed and the semen sample verified with her partner's  Information. A pre- and post-prep semen analysis was done showing a normal count with good motility. She was positioned in dorsal lithotomy and a speculum inserted visualizing her cervix.  The Tomcat catheter inserted through the cervix into the uterus. The semen sample of 1 ml was delivered into the endometrial cavity.  The speculum was removed. The patient was kept in supine position for 15 min and then dismissed from the clinic in stable condition. She was instructed to have timed intercourse with her spouse for the next two consecutive days as well.     A/P:  Ms. Ann Lopez is a 37 y.o.  here for primary unexplained infertility, IUI. Instructed to take pregnancy test if no menses by day 35 and call with results.  If unsuccessful, plans to see [facility].     Sona Newman MD  OB/GYN  2017 4:11 PM

## 2018-02-12 NOTE — PROGRESS NOTES
Patient Information     Patient Name MRN Sex Ann Escobedo [id number] Female 1980      Progress Notes by Ann Isaac PA-C at 2017  8:30 AM     Author:  Ann Isaac PA-C Service:  (none) Author Type:  PHYS- Physician Assistant     Filed:  2017  9:28 AM Encounter Date:  2017 Status:  Signed     :  Ann Isaac PA-C (PHYS- Physician Assistant)            Nursing Notes:   Glenny House  2017  8:47 AM  Signed  Patient presents to the clinic for pregnancy confirmation she has had a positive at home test.  Glenny House LPN........................2017  8:34 AM      HPI:    Ann Lopez is a 37 y.o. female who presents for pregnancy verification. Pregnant with IUI. Procedure on 2017 by Dr. Sona Newman.     History of being pregnant - no  Home pregnancy test - positive  LMP - 2017  Current symptoms - breasts hurt  Birth control use - no  Planned pregnancy - yes  Taking a prenatal multivitamin - Taking DHA, not started prenatal yet.   Concerns about STDs - no  Using tobacco - no  Using alcohol - no  Using recreational drugs - no  Fevers - no  Abdominal pain - no  Vaginal symptoms, bleeding, spotting, discharge - no    Past Medical History:     Diagnosis  Date     Dysmenorrhea 2006     Herpes simplex with unspecified complication 2004    genitial      Stress fracture     left hip      Vitamin D deficiency 10/5/2016       Past Surgical History:      Procedure  Laterality Date     REFRACTIVE SURGERY         Social History       Substance Use Topics         Smoking status:   Never Smoker     Smokeless tobacco:   Never Used     Alcohol use   Yes      Comment: social        No current outpatient prescriptions on file.     No current facility-administered medications for this visit.      Medications have been reviewed by me and are current to the best of my knowledge and ability.      No Known Allergies    REVIEW OF SYSTEMS:  Refer to  HPI.    EXAM:   Vitals:    /68 (Cuff Site: Right Arm, Position: Sitting, Cuff Size: Adult Regular)  Pulse 76  Wt 81.3 kg (179 lb 3.2 oz)  LMP 11/22/2017  Breastfeeding? No  BMI 24.3 kg/m2    General Appearance: Pleasant, alert, appropriate appearance for age. No acute distress  Chest/Respiratory Exam: Normal chest wall and respirations. Clear to auscultation.  Cardiovascular Exam: Regular rate and rhythm. S1, S2, no murmur, click, gallop, or rubs.  Gastrointestinal Exam: Soft, non-tender, no masses or organomegaly. Normal BS x 4. No CVA tenderness to palpation.  Psychiatric Exam: Alert and oriented - appropriate affect.    PHQ Depression Screen  Date of PHQ exam: 12/27/17  Over the last 2 weeks, how often have you been bothered by any of the following problems?  1. Little interest or pleasure in doing things: 0 - Not at all  2. Feeling down, depressed, or hopeless: 0 - Not at all            LABS:    Results for orders placed or performed in visit on 12/27/17       Urine pregnancy test (HCG), qualitative       Result  Value Ref Range Status    PREGNANCY,URINE           Positive (Positive) Negative Final       ASSESSMENT AND PLAN:      ICD-10-CM    1. Positive pregnancy test Z32.01    2. Amenorrhea N91.2 Urine pregnancy test (HCG), qualitative      Urine pregnancy test (HCG), qualitative       Pregnancy test is positive.  Encouraged use of prenatal vitamins. Discussed normal symptoms of early pregnancy and safe medications to take. Encouraged avoidance of alcohol, tobacco and recreational drugs of any kind.     Patient Instructions   Return at 6 weeks for pregnancy check.      -Take prenatal multivitamin daily.    -Encouraged increase of fluids and a balanced diet.    -Encouraged routine exercise.    -Abstain from alcohol, smoking and illicit drug use.   -Avoid taking medications like ibuprofen, advil, aspirin and aleve. You may take tylenol for pain.   -Limit caffeine consumption to less than 200 to 300 mg  per day .    Your estimated due date is: 8/29/2018  You are currently 5 weeks 0 days along.         Index [place]   Eating Healthy Foods When You Are Pregnant: Brief Version   ________________________________________________________________________  KEY POINTS    When you are pregnant, you need to eat healthy foods to help you and your growing baby stay healthy.    Eat a variety of fruits, vegetables, whole grains, milk products, and proteins. Drink plenty of water too.    Try to limit fish that may have mercury, and limit caffeine.    Don t drink alcohol or use illegal drugs.  ________________________________________________________________________  When you are pregnant, you need to eat healthy foods to help you and your growing baby stay healthy. Eating right can also help you feel better.   The best time to start eating a healthy, balanced diet is before you get pregnant.  If it is hard for you to afford healthy foods, talk to your healthcare provider about it. He or she may know about government programs that can help.  What foods do I need to eat?   What you eat gives your baby what he or she needs to grow. When you eat healthy foods, you give your baby strong bones and teeth, healthy skin, and a healthy body. Eating right keeps you healthy, too.  Here's what you should eat every day.    Protein: 5 and 1/2 to 7 ounces a day    Grains: 6 to 8 ounces every day (Eat less processed food and more whole grains.)    Fruits: 2 cups every day    Vegetables: 2 and 1/2 to 3 cups every day    Dairy (Milk) Products: 3 cups every day (It s best to choose low-fat or nonfat dairy products.)    Healthy Fats: Eat fats, such as canola and olive oils, avocado, soft (no trans-fat) margarines, mayonnaise, and oil based salad dressings in moderation.    Liquids: Try to drink at least 8 cups of liquid each day. Water is best. All drinks should be low in fat, sugar, and caffeine.  Your healthcare provider will most likely prescribe  prenatal vitamins. This will help make sure you get the vitamins and minerals you need.  What can I do if I'm having trouble eating?   If you have nausea or vomiting, it may help to:    Eat crackers, pretzels, or dry cereal before you get up in the morning.    Eat small meals often.    Stay away from greasy, fried, or spicy foods.  See your healthcare provider if you can't keep anything down.  If you are constipated, it may help to eat more fresh fruits, vegetables, high-fiber breads, and cereals. Do not use laxatives unless your healthcare provider tells you to.  If you have diarrhea, it may help to:    Eat yogurt, rice, dry toast, applesauce, or bananas.    Ask your healthcare provider about taking medicine for diarrhea.  If you get heartburn, it may help to:    Eat 5 or 6 small meals a day instead of 2 or 3 large meals.    Eat less spicy or fatty food.    Bake or broil your food instead of frying it.    Stay away from orange juice or grapefruit juice. Instead, drink water, milk, apple juice, or cranberry juice.    Not lie down for 1 to 2 hours after you eat.    Ask your healthcare provider if you can use an antacid.  Are there things I should not eat, drink, or use when I am pregnant?   To keep healthy and have a healthy baby:    Stay away from wine, beer, and liquor.    Don t use tobacco or drugs.    Check with your healthcare provider before you take any medicine.    Use less caffeine. Caffeine is in soft drinks, chocolate, coffee, and some kinds of tea. Some doctors say you should not have any caffeine during the first 3 months of your pregnancy. They also say that you should have no more caffeine than is in one 12-oz cup of regular brewed coffee during the rest of your pregnancy.    Don t eat or drink:    Any foods made with unpasteurized milk. (Read labels, especially on packages of soft cheese.)    Raw sprouts.    Meat, fish, shellfish, or eggs that are raw or undercooked.    Shark, swordfish, fredrick  mackerel, or tilefish (also called snapper).    Each week don t eat more than:    6 ounces of canned white (albacore) tuna, tuna steak, or halibut    A total of 12 ounces of fish  The best choices of fish are shrimp, pollock, salmon, cod, catfish, or light canned tuna.    Keep following this advice while you are breast-feeding your baby.  If you don t eat meat or you have a health problem like diabetes, be sure to talk to your healthcare provider about your diet.  Developed by [employer].  Adult Advisor 2017.2 published by [employer].  Last modified: 2016-11-21  Last reviewed: 2016-11-17  This content is reviewed periodically and is subject to change as new health information becomes available. The information is intended to inform and educate and is not a replacement for medical evaluation, advice, diagnosis or treatment by a healthcare professional.  References   Adult Advisor 2017.2 Index    Copyright   2017 [employer], a division of [employer] All rights reserved.        Index [place]   Morning Sickness   ________________________________________________________________________  KEY POINTS    Morning sickness is stomach upset, nausea, or vomiting that often happens during pregnancy.    Treatment can include medicine and IV fluids prescribed by your healthcare provider to reduce nausea and vomiting.    Taking small sips of fluids, sucking on ice chips or Popsicles, or sipping peppermint tea may help with replacing fluids.  ________________________________________________________________________  What is morning sickness?  Morning sickness is stomach upset, nausea, or vomiting that often happens during pregnancy. Nausea is a feeling of stomach upset and often is the feeling you have just before you vomit. Vomiting is throwing up food and fluid from the stomach through the mouth. Sometimes you may feel nausea without vomiting. Many pregnant women have morning sickness during the first 3 months of  pregnancy. Morning sickness can happen any time of the day.  If you have severe morning sickness, it can cause problems for you and the baby. You could lose weight and lose too much fluid from your body. You and the baby may not get enough nutrients, or your body s chemicals may get out of balance.  What is the cause?  It is not well understood why some women have morning sickness and others do not. Women with high levels of pregnancy hormones tend to have morning sickness. It is also more common when a woman is pregnant with more than 1 baby, like with twins.  Morning sickness tends to be worse during your first pregnancy. It usually goes away by about the 14th week of pregnancy, when the level of pregnancy hormones gets lower.  What are the symptoms?  Mild symptoms include nausea, queasy stomach, and vomiting 1 to 2 times a day. Symptoms of severe morning sickness may include:    Repeated vomiting soon after you eat or drink anything, including water    Weight loss    Dark-colored urine    Dizziness or lightheadedness  How is it diagnosed?  Your healthcare provider will ask about your symptoms and medical history and examine you. Tests may include:    Blood tests    Urine test  If you have severe morning sickness, you may need other tests or scans.  How is it treated?  Severe morning sickness may be treated with:    Medicine prescribed by your healthcare provider to reduce nausea and vomiting    Staying at the hospital and getting IV fluids instead of eating or drinking anything for 1 to 3 days, then slowly adding liquids and food back into your diet  How can I take care of myself?  Things that you can do that might help relieve morning sickness are:    Eat snacks that are high in protein, such as cheese, a glass of milk, or low-fat yogurt.    Eat small meals often (4 to 6 times a day) instead of 2 or 3 larger meals.    Avoid strong odors and greasy or spicy foods.    Eat more foods with vitamin B6, such as green,  leafy vegetables.    Eat dry toast or crackers or a couple slices of apple before you get out of bed. Movement on an empty stomach often makes morning sickness worse.    Wear acupressure wrist bands, like the wrist bands used for motion sickness.    Take vitamin B6 pills or get B6 shots if recommended by your provider.  A dietitian can help you plan a way to eat a balanced diet.  Because you are losing fluids when you throw up, it s important to drink fluids. Even if liquids stay down just an hour, your body will absorb a lot in that time. Try sucking on ice chips or Popsicles. Take small sips often rather than drinking a whole glass of fluid at once. Some women find that drinking small sips of peppermint tea, ginger tea, or ginger ale helps their symptoms.  Hypnosis or acupuncture may help. Check with your healthcare provider before you try any natural remedies or alternative treatments.  Developed by [employer].  Adult Advisor 2017.2 published by [employer].  Last modified: 2015-07-17  Last reviewed: 2015-07-17  This content is reviewed periodically and is subject to change as new health information becomes available. The information is intended to inform and educate and is not a replacement for medical evaluation, advice, diagnosis or treatment by a healthcare professional.  References   Adult Advisor 2017.2 Index    Copyright   2017 [employer], a division of [employer] All rights reserved.           Ann Isaac PA-C..................12/27/2017 8:47 AM

## 2018-02-13 NOTE — PROGRESS NOTES
Patient Information     Patient Name MRN Sex     Ann Lopez [id number] Female 1980      Progress Notes by Sona Newman MD at 2018  3:00 PM     Author:  Soan Newman MD Service:  (none) Author Type:  Physician     Filed:  2018  4:34 PM Encounter Date:  2018 Status:  Signed     :  Sona Newman MD (Physician)            INITIAL OB VISIT    HPI  Ann Lopez is a 37 y.o. female  at 6w6d by LMP c/w 6w6d US who presents for first OB visit. This pregnancy was conceived on first cycle of clomid + IUI. They are excited    SYMPTOMS SINCE LMP  Fatigue: Yes  Nausea: Yes  Emesis: No  Bleeding: No  Breast tenderness: Yes    MENSTRUAL HISTORY  LMP:Patient's last menstrual period was 2017.  Definite:  Yes  Menses monthly:  Yes  On contraception at conception:  No    PAST PREGNANCIES  First pregnancy    PAST MEDICAL/SURGICAL HISTORY  Past Medical History:     Diagnosis  Date     Dysmenorrhea 2006     Herpes simplex with unspecified complication 2004    genitial      Stress fracture 2000    left hip      Vitamin D deficiency 10/5/2016       Past Surgical History:      Procedure  Laterality Date     REFRACTIVE SURGERY         Current Outpatient Prescriptions       Medication  Sig Dispense Refill     Fish Oil-Omega-3 Fatty Acids 435-880 mg cap Take 1 capsule by mouth once daily.       Prenatal Multivit-Ca-Min-Fe-FA (PRENATAL VITAMIN) tab tablet Take 1 tablet by mouth once daily.       No current facility-administered medications for this visit.      Medications have been reviewed by me and are current to the best of my knowledge and ability.      Allergies: Review of patient's allergies indicates no known allergies.    SOCIAL HISTORY  Tobacco:  No  Alcohol:  No  Drugs:  No  . Lives with her  Juan. Works as a massage therapist.     Family History       Problem   Relation Age of Onset     Thyroid Disease  Sister      borderline;  infertility; 3 yrs younger       Good Health  Mother      Alcoholism  Father      Thyroid Disease  Other      father's side of the family       Heart Disease  Maternal Grandmother      Emphysema  Paternal Grandmother      Leukemia  Paternal Grandfather        GENETIC SCREENING:  Maternal pertinent postives: No  Paternal pertinent positives: No    INFECTION HISTORY  Patient or partner with hx HSV:Yes- pt had one outbreak in  on her genitals, none since  Rash or viral illness since LMP:No  Hepatitis B or C: No  STD (GC, Chlamydia, HPV):No    ROS: see HPI, complete ROS otherwise negative    PHYSICAL EXAM  /58 (Cuff Site: Right Arm, Position: Sitting, Cuff Size: Adult Regular)  Pulse 76  Ht 1.829 m (6')  Wt 82.6 kg (182 lb)  LMP 2017  BMI 24.68 kg/m2   General: Pleasant WN female, A and O x3, NAD  HEENT : Grossly normal  Neck: Thyroid normal size, with no nodules, no adenopathy  Lungs: Clear, good AE, no rales or rhonchi  Heart: RRR no murmur , NL S1 and S2  Abdomen: Soft NT, ND, no masses, normal BS  Ext: No edema    Pelvic:  EGBUS Normal  Cervix Normal  Uterus nontender, 7 wk size, anteverted  Adnexa, neg for tenderness or mass  Cx closed /Long / High       US 2018   CLINICAL HISTORY: Patient Age  37 years  Please verify dating and viability; infertility patient  COMPARISON: None  TECHNIQUE:  Standard sonographic technique  FINDINGS: Single living intrauterine gestation. The gestational sac, yolk sac, and embryo are identified. Crown-rump length 6.7 mm. Estimated age of 6 weeks and 4 days. Fetal heart rate 124 bpm. Bilateral maternal adnexa appear normal.  IMPRESSION: Single living intrauterine gestation. Estimated ultrasound age 6 weeks and 4 days. Estimated date of delivery 2018    IMPRESSION   IUP at 6w6d weeks by LMP c/w 6w6d US    Risk factors identified: AMA, hx of HSV  High risk pregnancy: Yes  Repeat C/S planned: No      PLAN:  Genital HSV: remote hx of isolated outbreak.  Discussed low risk of outbreak in pregnancy but still recommend ppx at 36 weeks  Discussed genetic testing available: Yes- declines. Understands increased risk of chromosomal anomalies based on age  1st trimester US ordered/completed: Yes  Anesthesia consult needed: No  OB/Gyn or MFM referral: No    GC/Chlam screening, routine labs ordered  Prenatal vitamin daily  Routine 1st trimester anticipatory guidance  Return to office in four weeks for follow up or sooner prn.  Questions answered.    Sona Newman MD  OB/GYN  1/9/2018 3:29 PM

## 2018-02-13 NOTE — PROGRESS NOTES
Patient Information     Patient Name MRN Sex Ann Escobedo [id number] Female 1980      Progress Notes by Elisha [facility]T. [facility] at 2018  1:56 PM     Author:  Elisha Denise R.T. ([facility]) Service:  (none) Author Type:  [employer] - Registered Radiologic Technologist     Filed:  2018  2:24 PM Date of Service:  2018  1:56 PM Status:  Signed     :  Elisha Denise R.T. ([facility]RT) ([facility] - Registered Radiologic Technologist)            Falls Risk Criteria:    Age 65 and older or under age 4        Sensory deficits    Poor vision    Use of ambulatory aides    Impaired judgment    Unable to walk independently    Meets High Risk criteria for falls:  No

## 2018-02-13 NOTE — PROGRESS NOTES
Patient Information     Patient Name MRN Sex     Ann Lopez [id number] Female 1980      Progress Notes by Sona Newman MD at 2018  2:30 PM     Author:  Sona Newman MD Service:  (none) Author Type:  Physician     Filed:  2018  3:01 PM Encounter Date:  2018 Status:  Signed     :  Sona Newman MD (Physician)            [facility]  Return OB Visit    S: Patient reports she has been feeling somewhat better. Nausea still present but less often. No cramping or bright red vaginal bleeding but did have brown discharge last week, possibly started after intercourse. She also had an episode of fainting. She reports similar episodes a few times per year when she needs to have a bowel movement at night. She will feel very hot and nauseated, and will get up to go have a bowel movement. However, she has never actually passed out before this time. She denies chest pain, palpitations, or shortness of breath during this episode. It has only happened once since she became pregnant.    O: /68  Pulse 80  Wt 84.4 kg (186 lb)  LMP 2017  BMI 25.23 kg/m2  Gen: Well-appearing, NAD    FHR: 170    A/P:  Ann Lopez is a 37 y.o.  at 11w0d by LMP c/w 6w6d US, here for return OB visit.  Remote hx of HSV: needs ppx at 36 weeks  AMA  Syncope episode: description sounds like a vasovagal episode. Instructed patient to return to clinic if happening more frequently or she develops any other symptoms associated with it    PNC:   - Prenatal labs: Rh positive, Rubella immune  - Genetics: declines  - Imaging: dating US at 6w6d  RTC 6 weeks    Sona Newman MD  OB/GYN  2018 2:56 PM

## 2018-03-20 ENCOUNTER — PRENATAL OFFICE VISIT (OUTPATIENT)
Dept: OBGYN | Facility: OTHER | Age: 38
End: 2018-03-20
Attending: OBSTETRICS & GYNECOLOGY
Payer: COMMERCIAL

## 2018-03-20 VITALS
HEART RATE: 68 BPM | SYSTOLIC BLOOD PRESSURE: 114 MMHG | WEIGHT: 187 LBS | DIASTOLIC BLOOD PRESSURE: 70 MMHG | BODY MASS INDEX: 25.36 KG/M2

## 2018-03-20 DIAGNOSIS — O09.529 SUPERVISION OF HIGH-RISK PREGNANCY OF ELDERLY MULTIGRAVIDA: Primary | ICD-10-CM

## 2018-03-20 PROCEDURE — 99207 ZZC OB VISIT-NO CHARGE - GICH ONLY: CPT | Performed by: OBSTETRICS & GYNECOLOGY

## 2018-03-20 ASSESSMENT — PAIN SCALES - GENERAL: PAINLEVEL: NO PAIN (0)

## 2018-03-20 NOTE — NURSING NOTE
Patient presents today for her prenatal visit. She is currently 16w6d.  Belem Nixon LPN  3/20/2018  1:22 PM

## 2018-03-20 NOTE — MR AVS SNAPSHOT
After Visit Summary   3/20/2018    Ann Lopez    MRN: [id number]           Patient Information     Date Of Birth          1980        Visit Information        Provider Department      3/20/2018 1:15 PM Sona Newman MD [facility]        Today's Diagnoses     Supervision of high-risk pregnancy of elderly multigravida    -  1       Follow-ups after your visit        Your next 10 appointments already scheduled     Apr 11, 2018  7:30 AM CDT   (Arrive by 7:15 AM)   US OB > 14 WEEKS COMPLETE SINGLE with GHUS1   [facility] ([facility])    [address] [facility] [address]   [phone]           Please bring a list of your medicines (including vitamins, minerals and over-the-counter drugs). Also, tell your doctor about any allergies you may have. Wear comfortable clothes and leave your valuables at home.  If you re less than 20 weeks drink four 8-ounce glasses of fluid an hour before your exam. If you need to empty your bladder before your exam, try to release only a little urine. Then, drink another glass of fluid.  You may have up to two family members in the exam room. If you bring a small child, an adult must be there to care for him or her.  Please call the Imaging Department at your exam site with any questions.            Apr 17, 2018  4:00 PM CDT   ESTABLISHED PRENATAL with Sona Newman MD   [facility] ([facility])    [address] [facility] [address] [phone]              Future tests that were ordered for you today     Open Future Orders        Priority Expected Expires Ordered    US OB > 14 Weeks Complete Single Routine  3/20/2019 3/20/2018            Who to contact     If you have questions or need follow up information about today's clinic visit or your schedule please contact [facility] directly at  "[phone].  Normal or non-critical lab and imaging results will be communicated to you by MyChart, letter or phone within 4 business days after the clinic has received the results. If you do not hear from us within 7 days, please contact the clinic through Joomehart or phone. If you have a critical or abnormal lab result, we will notify you by phone as soon as possible.  Submit refill requests through Favim or call your pharmacy and they will forward the refill request to us. Please allow 3 business days for your refill to be completed.          Additional Information About Your Visit        JoomeharContent Savvy Information     Favim lets you send messages to your doctor, view your test results, renew your prescriptions, schedule appointments and more. To sign up, go to [email or web address] . Click on \"Log in\" on the left side of the screen, which will take you to the Welcome page. Then click on \"Sign up Now\" on the right side of the page.     You will be asked to enter the access code listed below, as well as some personal information. Please follow the directions to create your username and password.     Your access code is: [email or web address]  Expires: 2018  1:41 PM     Your access code will  in 90 days. If you need help or a new code, please call your [place] clinic or [phone].        Care EveryWhere ID     This is your Care EveryWhere ID. This could be used by other organizations to access your [place] medical records  [id number]        Your Vitals Were     Pulse BMI (Body Mass Index)                68 25.36 kg/m2           Blood Pressure from Last 3 Encounters:   18 114/70   18 106/68   18 106/58    Weight from Last 3 Encounters:   18 84.8 kg (187 lb)   18 84.4 kg (186 lb)   18 82.6 kg (182 lb)               Primary Care Provider Office Phone # Fax #    Tanesha Garza [phone] [address] [facility] [address]      "   Equal Access to Services     [facility] : Hadii aad ku hadcmdeon Marshadayna, wadivyada luqogha, qayaminita monycarrieshey maxwell. So [facility] [phone].    ATENCIÓN: Si habla español, tiene a berman disposición servicios gratuitos de asistencia lingüística. Llame al [phone].    We comply with applicable federal civil rights laws and [place] laws. We do not discriminate on the basis of race, color, national origin, age, disability, sex, sexual orientation, or gender identity.            Thank you!     Thank you for choosing [facility] AND [facility]  for your care. Our goal is always to provide you with excellent care. Hearing back from our patients is one way we can continue to improve our services. Please take a few minutes to complete the written survey that you may receive in the mail after your visit with us. Thank you!             Your Updated Medication List - Protect others around you: Learn how to safely use, store and throw away your medicines at www.disposemymeds.org.          This list is accurate as of 3/20/18  1:41 PM.  Always use your most recent med list.                   Brand Name Dispense Instructions for use Diagnosis    CVS PRENATAL 28-0.8 MG Tabs      Take 1 tablet by mouth daily        FISH OIL PO      Take 1 capsule by mouth daily

## 2018-03-20 NOTE — PROGRESS NOTES
Return OB Visit    S: Patient has been feeling better. Nausea resolved. Still tired. No cramping or bleeding. No flutters yet.    O: /70 (BP Location: Right arm, Patient Position: Sitting, Cuff Size: Adult Large)  Pulse 68  Wt 84.8 kg (187 lb)  BMI 25.36 kg/m2  Gen: Well-appearing, NAD  See OB Flowsheet    A/P:  Ann Lopez is a 37 year old  at 16w6d by LMP c/w 6w6d US, here for return OB visit.  Remote hx of HSV: needs ppx at 36 weeks  AMA     PNC:   - Prenatal labs: Rh positive, Rubella immune  - Genetics: declines  - Imaging: dating US at 6w6d. Anatomy US ord'd  RTC 4 weeks    Sona Newman MD  OB/GYN  3/20/2018 1:40 PM

## 2018-04-11 ENCOUNTER — HOSPITAL ENCOUNTER (OUTPATIENT)
Dept: ULTRASOUND IMAGING | Facility: OTHER | Age: 38
Discharge: HOME | End: 2018-04-11
Attending: OBSTETRICS & GYNECOLOGY | Admitting: OBSTETRICS & GYNECOLOGY
Payer: COMMERCIAL

## 2018-04-11 DIAGNOSIS — O09.529 SUPERVISION OF HIGH-RISK PREGNANCY OF ELDERLY MULTIGRAVIDA: ICD-10-CM

## 2018-04-11 PROCEDURE — 76805 OB US >/= 14 WKS SNGL FETUS: CPT

## 2018-04-17 ENCOUNTER — PRENATAL OFFICE VISIT (OUTPATIENT)
Dept: OBGYN | Facility: OTHER | Age: 38
End: 2018-04-17
Attending: OBSTETRICS & GYNECOLOGY
Payer: COMMERCIAL

## 2018-04-17 VITALS
WEIGHT: 191.56 LBS | SYSTOLIC BLOOD PRESSURE: 104 MMHG | HEART RATE: 80 BPM | BODY MASS INDEX: 25.98 KG/M2 | DIASTOLIC BLOOD PRESSURE: 64 MMHG

## 2018-04-17 DIAGNOSIS — O09.519 SUPERVISION OF HIGH-RISK PREGNANCY OF ELDERLY PRIMIGRAVIDA: Primary | ICD-10-CM

## 2018-04-17 PROCEDURE — 99207 ZZC OB VISIT-NO CHARGE - GICH ONLY: CPT | Performed by: OBSTETRICS & GYNECOLOGY

## 2018-04-17 ASSESSMENT — PAIN SCALES - GENERAL: PAINLEVEL: NO PAIN (0)

## 2018-04-17 NOTE — PROGRESS NOTES
Return OB Visit    S: Patient is feeling well, just tired. No cramping or bleeding. No FM yet.    O: /64 (BP Location: Right arm, Patient Position: Sitting, Cuff Size: Adult Regular)  Pulse 80  Wt 86.9 kg (191 lb 9 oz)  BMI 25.98 kg/m2  Gen: Well-appearing, NAD  See OB Flowsheet    A/P:  Ann Lopez is a 37 year old  at 20w6d by LMP c/w 6w6d US, here for return OB visit.  Remote hx of HSV: needs ppx at 36 weeks  AMA      PNC:   - Prenatal labs: Rh positive, Rubella immune  - Genetics: declines  - Imaging: dating US at 6w6d. Anatomy US normal  RTC 4 weeks    Sona Newman MD  OB/GYN  2018 4:18 PM

## 2018-04-17 NOTE — NURSING NOTE
Patient presents today for her prenatal check-up. She is currently 20w6d.  Belem Nixon LPN  4/17/2018  4:02 PM

## 2018-04-17 NOTE — MR AVS SNAPSHOT
"              After Visit Summary   4/17/2018    Ann Lopez    MRN: [id number]           Patient Information     Date Of Birth          1980        Visit Information        Provider Department      4/17/2018 4:00 PM Sona Newman MD [facility]        Today's Diagnoses     Supervision of high-risk pregnancy of elderly primigravida    -  1       Follow-ups after your visit        Your next 10 appointments already scheduled     May 18, 2018  9:15 AM CDT   ESTABLISHED PRENATAL with Sona Newman MD   [facility] ([facility])    [address]  [place] [address] [phone]              Who to contact     If you have questions or need follow up information about today's clinic visit or your schedule please contact [facility] directly at [phone].  Normal or non-critical lab and imaging results will be communicated to you by MyChart, letter or phone within 4 business days after the clinic has received the results. If you do not hear from us within 7 days, please contact the clinic through Cayo-Techhart or phone. If you have a critical or abnormal lab result, we will notify you by phone as soon as possible.  Submit refill requests through Nutrabolt or call your pharmacy and they will forward the refill request to us. Please allow 3 business days for your refill to be completed.          Additional Information About Your Visit        MyChart Information     Nutrabolt lets you send messages to your doctor, view your test results, renew your prescriptions, schedule appointments and more. To sign up, go to [email or web address] . Click on \"Log in\" on the left side of the screen, which will take you to the Welcome page. Then click on \"Sign up Now\" on the right side of the page.     You will be asked to enter the access code listed below, as well as some personal information. Please follow the directions to " create your username and password.     Your access code is: [email or web address]  Expires: 2018  1:41 PM     Your access code will  in 90 days. If you need help or a new code, please call your [facility] or [phone].        Care EveryWhere ID     This is your Care EveryWhere ID. This could be used by other organizations to access your [place] medical records  [id number]        Your Vitals Were     Pulse BMI (Body Mass Index)                80 25.98 kg/m2           Blood Pressure from Last 3 Encounters:   18 104/64   18 114/70   18 106/68    Weight from Last 3 Encounters:   18 86.9 kg (191 lb 9 oz)   18 84.8 kg (187 lb)   18 84.4 kg (186 lb)              Today, you had the following     No orders found for display       Primary Care Provider Office Phone # Fax #    Tanesha PARKER Del Garza [phone]       [address] [facility] [address]        Equal Access to Services     [facility] : Hadii aad ku hadasho Sodayna, waaxda luqadaha, qaybta kaalmada lea, shey mendoza . So [facility] [phone].    ATENCIÓN: Si habla español, tiene a berman disposición servicios gratuitos de asistencia lingüística. Ll[facility] [phone].    We comply with applicable federal civil rights laws and [place] laws. We do not discriminate on the basis of race, color, national origin, age, disability, sex, sexual orientation, or gender identity.            Thank you!     Thank you for choosing [facility] AND [facility]  for your care. Our goal is always to provide you with excellent care. Hearing back from our patients is one way we can continue to improve our services. Please take a few minutes to complete the written survey that you may receive in the mail after your visit with us. Thank you!             Your Updated Medication List - Protect others around you: Learn how to safely use, store and throw away your medicines at  www.disposemymeds.org.          This list is accurate as of 4/17/18  4:20 PM.  Always use your most recent med list.                   Brand Name Dispense Instructions for use Diagnosis    CVS PRENATAL 28-0.8 MG Tabs      Take 1 tablet by mouth daily        FISH OIL PO      Take 1 capsule by mouth daily

## 2018-05-18 ENCOUNTER — PRENATAL OFFICE VISIT (OUTPATIENT)
Dept: OBGYN | Facility: OTHER | Age: 38
End: 2018-05-18
Attending: OBSTETRICS & GYNECOLOGY
Payer: COMMERCIAL

## 2018-05-18 VITALS
BODY MASS INDEX: 25.46 KG/M2 | DIASTOLIC BLOOD PRESSURE: 80 MMHG | SYSTOLIC BLOOD PRESSURE: 112 MMHG | WEIGHT: 187.7 LBS | HEART RATE: 66 BPM

## 2018-05-18 DIAGNOSIS — O09.519 SUPERVISION OF HIGH-RISK PREGNANCY OF ELDERLY PRIMIGRAVIDA: Primary | ICD-10-CM

## 2018-05-18 LAB
ERYTHROCYTE [DISTWIDTH] IN BLOOD BY AUTOMATED COUNT: 13.4 % (ref 10–15)
GLUCOSE 1H P 50 G GLC PO SERPL-MCNC: 79 MG/DL (ref 60–129)
HCT VFR BLD AUTO: 38.1 % (ref 35–47)
HGB BLD-MCNC: 13.2 G/DL (ref 11.7–15.7)
MCH RBC QN AUTO: 34.1 PG (ref 26.5–33)
MCHC RBC AUTO-ENTMCNC: 34.6 G/DL (ref 31.5–36.5)
MCV RBC AUTO: 98 FL (ref 78–100)
PLATELET # BLD AUTO: 167 10E9/L (ref 150–450)
RBC # BLD AUTO: 3.87 10E12/L (ref 3.8–5.2)
WBC # BLD AUTO: 6.8 10E9/L (ref 4–11)

## 2018-05-18 PROCEDURE — 99207 ZZC OB VISIT-NO CHARGE - GICH ONLY: CPT | Performed by: OBSTETRICS & GYNECOLOGY

## 2018-05-18 PROCEDURE — 86780 TREPONEMA PALLIDUM: CPT | Performed by: OBSTETRICS & GYNECOLOGY

## 2018-05-18 PROCEDURE — 82950 GLUCOSE TEST: CPT | Performed by: OBSTETRICS & GYNECOLOGY

## 2018-05-18 PROCEDURE — 85027 COMPLETE CBC AUTOMATED: CPT | Performed by: OBSTETRICS & GYNECOLOGY

## 2018-05-18 PROCEDURE — 36415 COLL VENOUS BLD VENIPUNCTURE: CPT | Performed by: OBSTETRICS & GYNECOLOGY

## 2018-05-18 ASSESSMENT — PAIN SCALES - GENERAL: PAINLEVEL: NO PAIN (0)

## 2018-05-18 NOTE — PROGRESS NOTES
Return OB Visit    S: Patient has been doing mostly well. Had food poisoning recently, no bloody stools. Has been avoiding candido lettuce. No ctx, VB or LOF. +FM.     O: /80 (BP Location: Right arm, Patient Position: Chair, Cuff Size: Adult Regular)  Pulse 66  Wt 85.1 kg (187 lb 11.2 oz)  BMI 25.46 kg/m2  Gen: Well-appearing, NAD  See OB Flowsheet    A/P:  Ann Lopez is a 37 year old  at 25w2d by LMP c/w 6w6d US, here for return OB visit.  Remote hx of HSV: needs ppx at 36 weeks  AMA      PNC:   - Prenatal labs: Rh positive, Rubella immune. GCT 2018   - Genetics: declines  - Imaging: dating US at 6w6d. Anatomy US normal  RTC 4 weeks- Tdap next visit    Sona Newman MD  OB/GYN  2018 9:17 AM

## 2018-05-18 NOTE — MR AVS SNAPSHOT
After Visit Summary   5/18/2018    Ann Lopez    MRN: [id number]           Patient Information     Date Of Birth          1980        Visit Information        Provider Department      5/18/2018 9:15 AM Sona Newman MD [facility]        Today's Diagnoses     Supervision of high-risk pregnancy of elderly primigravida    -  1       Follow-ups after your visit        Your next 10 appointments already scheduled     Ac 15, 2018  9:15 AM CDT   ESTABLISHED PRENATAL with Sona Newman MD   [facility] ([facility])    [address]  [place] [address]   [phone]            Jun 29, 2018  9:15 AM CDT   ESTABLISHED PRENATAL with Sona Newman MD   [facility] ([facility])    [address]  [place] [address]   [phone]            Jul 13, 2018 10:15 AM CDT   ESTABLISHED PRENATAL with Sona Newman MD   [facility] ([facility])    [address]  [place] [address]   [phone]            Jul 27, 2018  9:15 AM CDT   ESTABLISHED PRENATAL with Sona Newman MD   [facility] ([facility])    [address]  [place] [address]   [phone]            Aug 03, 2018  9:15 AM CDT   ESTABLISHED PRENATAL with Sona Newman MD   [facility] ([facility])    [address]  [place] [address]   [phone]            Aug 10, 2018  9:00 AM CDT   ESTABLISHED PRENATAL with Sona Newman MD   [facility] ([facility])    [address]  [place] [address]   [phone]            Aug 17, 2018  9:15 AM CDT   ESTABLISHED PRENATAL with Sona Newman MD   [facility] ([facility])    [address]  [place] [address]   [phone]            Aug 24, 2018  9:00 AM CDT   ESTABLISHED PRENATAL with Sona Newman MD   [facility] ([facility])    [address]  [place] [address]   [phone]            Aug 31, 2018  9:15 AM CDT   ESTABLISHED PRENATAL with Sona Newman MD   [facility] ([facility])    [address]  [place] [address]   [phone]              Who to contact     If you have questions or need follow up information about today's clinic visit or your schedule please contact [facility] directly at [phone].  Normal or non-critical lab and imaging results will be communicated to you by NanoVibronixhart, letter or phone within 4 business days after the clinic has received the results. If you do not hear from us within 7 days, please contact the clinic through Gentor Resourcest or phone. If you have a critical or abnormal lab result, we will notify you by phone as soon as possible.  Submit refill requests through [employer] Group or call your pharmacy and they will forward the refill request to us. Please allow 3 business days for your refill to be completed.          Additional Information About Your Visit        NanoVibronixharRue89 Information     [employer] Solution [employer] Group gives you secure access to your electronic health record. If you see a primary care provider, you can also send messages to your care team and make appointments. If you have questions, please call your primary care clinic.  If you do not have a primary care provider, please call [phone] and they will assist you.        Care EveryWhere ID     This is your Care EveryWhere ID. This could be used by other organizations to access your [place] medical records  [id number]        Your Vitals Were     Pulse BMI (Body Mass Index)                66 25.46 kg/m2           Blood Pressure from Last 3  Encounters:   05/18/18 112/80   04/17/18 104/64   03/20/18 114/70    Weight from Last 3 Encounters:   05/18/18 85.1 kg (187 lb 11.2 oz)   04/17/18 86.9 kg (191 lb 9 oz)   03/20/18 84.8 kg (187 lb)              We Performed the Following     CBC W PLT No Diff     Glucose tolerance, gest screen, 1 hour     Treponema Abs w Reflex to RPR and Titer        Primary Care Provider Office Phone # Fax #    Tanesha PARKER Del Garza [phone] [address]  [facility] [address]        Equal Access to Services     [facility] : Hadii tyrone Alberto, waaxda luqadaha, qaybta kaalmada lea, shey mendoza . So [facility] [phone].    ATENCIÓN: Si habla español, tiene a berman disposición servicios gratuitos de asistencia lingüística. Llame al [phone].    We comply with applicable federal civil rights laws and [place] laws. We do not discriminate on the basis of race, color, national origin, age, disability, sex, sexual orientation, or gender identity.            Thank you!     Thank you for choosing [facility]  for your care. Our goal is always to provide you with excellent care. Hearing back from our patients is one way we can continue to improve our services. Please take a few minutes to complete the written survey that you may receive in the mail after your visit with us. Thank you!             Your Updated Medication List - Protect others around you: Learn how to safely use, store and throw away your medicines at www.disposemymeds.org.          This list is accurate as of 5/18/18  9:39 AM.  Always use your most recent med list.                   Brand Name Dispense Instructions for use Diagnosis    CVS PRENATAL 28-0.8 MG Tabs      Take 1 tablet by mouth daily        FISH OIL PO      Take 1 capsule by mouth daily

## 2018-05-19 LAB — T PALLIDUM AB SER QL: NONREACTIVE

## 2018-06-15 ENCOUNTER — PRENATAL OFFICE VISIT (OUTPATIENT)
Dept: OBGYN | Facility: OTHER | Age: 38
End: 2018-06-15
Attending: OBSTETRICS & GYNECOLOGY
Payer: COMMERCIAL

## 2018-06-15 VITALS
HEART RATE: 72 BPM | BODY MASS INDEX: 26.04 KG/M2 | WEIGHT: 192 LBS | DIASTOLIC BLOOD PRESSURE: 70 MMHG | SYSTOLIC BLOOD PRESSURE: 108 MMHG

## 2018-06-15 DIAGNOSIS — O09.513 SUPERVISION OF HIGH-RISK PREGNANCY OF ELDERLY PRIMIGRAVIDA, THIRD TRIMESTER: Primary | ICD-10-CM

## 2018-06-15 PROCEDURE — 99207 ZZC OB VISIT-NO CHARGE - GICH ONLY: CPT | Performed by: OBSTETRICS & GYNECOLOGY

## 2018-06-15 ASSESSMENT — PAIN SCALES - GENERAL: PAINLEVEL: NO PAIN (0)

## 2018-06-15 NOTE — MR AVS SNAPSHOT
After Visit Summary   6/15/2018    Ann Lopez    MRN: [id number]           Patient Information     Date Of Birth          1980        Visit Information        Provider Department      6/15/2018 9:15 AM Sona Newman MD [facility]        Today's Diagnoses     Supervision of high-risk pregnancy of elderly primigravida, third trimester    -  1       Follow-ups after your visit        Your next 10 appointments already scheduled     Jun 29, 2018  9:15 AM CDT   ESTABLISHED PRENATAL with Sona Newman MD   [facility]    [address]  [place] [address]   [phone]            Jul 13, 2018 10:15 AM CDT   ESTABLISHED PRENATAL with Sona Newman MD   [facility]    [address]  [place] [address]   [phone]            Jul 27, 2018  9:15 AM CDT   ESTABLISHED PRENATAL with Sona Newman MD   [facility])    [address]  [place] [address]   [phone]            Aug 03, 2018  9:15 AM CDT   ESTABLISHED PRENATAL with Sona Newman MD   [facility])    [address]  [place] [address]   [phone]            Aug 10, 2018  9:00 AM CDT   ESTABLISHED PRENATAL with Sona Newman MD   [facility]    [address]  [place] [address]   [phone]            Aug 17, 2018  9:15 AM CDT   ESTABLISHED PRENATAL with Sona Newman MD   [facility])    [address]  [place] [address]   [phone]            Aug 24, 2018  9:00 AM CDT   ESTABLISHED PRENATAL with Sona Newman MD   [facility] ([facility] [place] [facility])    [address]  [place] [address] [phone]            Aug 31, 2018  9:15 AM CDT   ESTABLISHED PRENATAL with Sona Newman MD   [facility] ([facility])    [address]  [place] [address] [phone]              Who to contact     If you have questions or need follow up information about today's clinic visit or your schedule please contact [facility] directly at [phone].  Normal or non-critical lab and imaging results will be communicated to you by SABIAhart, letter or phone within 4 business days after the clinic has received the results. If you do not hear from us within 7 days, please contact the clinic through Black Houset or phone. If you have a critical or abnormal lab result, we will notify you by phone as soon as possible.  Submit refill requests through Music Connect or call your pharmacy and they will forward the refill request to us. Please allow 3 business days for your refill to be completed.          Additional Information About Your Visit        SABIAhart Information     Music Connect gives you secure access to your electronic health record. If you see a primary care provider, you can also send messages to your care team and make appointments. If you have questions, please call your primary care clinic.  If you do not have a primary care provider, please call [phone] and they will assist you.        Care EveryWhere ID     This is your Care EveryWhere ID. This could be used by other organizations to access your [place] medical records  [id number]        Your Vitals Were     Pulse BMI (Body Mass Index)                72 26.04 kg/m2           Blood Pressure from Last 3 Encounters:   06/15/18 108/70   05/18/18 112/80   04/17/18 104/64    Weight from Last 3 Encounters:   06/15/18 87.1 kg (192 lb)   05/18/18 85.1 kg (187 lb 11.2 oz)   04/17/18 86.9 kg (191 lb 9 oz)              Today,  you had the following     No orders found for display       Primary Care Provider Office Phone # Fax #    Tanesha PARKER Del Garza [phone] [address]   [facility] [address]        Equal Access to Services     ALICE ETIENNE : Hadii tyrone coronado meloo Soomaali, waaxda luqadaha, qaybta kaalmada adeangieyada, shey pace cherryjohanny mattraphaelbrooke nelson. So [facility] [phone].    ATENCIÓN: Si habla español, tiene a berman disposición servicios gratuitos de asistencia lingüística. Llame al [phone].    We comply with applicable federal civil rights laws and [place] laws. We do not discriminate on the basis of race, color, national origin, age, disability, sex, sexual orientation, or gender identity.            Thank you!     Thank you for choosing [facility] AND [facility]  for your care. Our goal is always to provide you with excellent care. Hearing back from our patients is one way we can continue to improve our services. Please take a few minutes to complete the written survey that you may receive in the mail after your visit with us. Thank you!             Your Updated Medication List - Protect others around you: Learn how to safely use, store and throw away your medicines at www.disposemymeds.org.          This list is accurate as of 6/15/18  9:54 AM.  Always use your most recent med list.                   Brand Name Dispense Instructions for use Diagnosis    CVS PRENATAL 28-0.8 MG Tabs      Take 1 tablet by mouth daily        FISH OIL PO      Take 1 capsule by mouth daily

## 2018-06-15 NOTE — PROGRESS NOTES
Return OB Visit    S: Patient has been feeling well, just tired. No ctx, VB or LOF. +FM.    O: /70 (BP Location: Right arm, Patient Position: Sitting, Cuff Size: Adult Large)  Pulse 72  Wt 87.1 kg (192 lb)  BMI 26.04 kg/m2  Gen: Well-appearing, NAD  See OB Flowsheet    A/P:  Ann Lopez is a 38 year old  at 29w2d by LMP c/w 6w6d US, here for return OB visit.  Remote hx of HSV: needs ppx at 36 weeks  AMA-  testing starting at 36 weeks  PCJ for baby  Plans no epidural      PNC:   - Prenatal labs: Rh positive, Rubella immune. GCT 2018   - Genetics: declines  - Imaging: dating US at 6w6d. Anatomy US normal  - Immunizations: discussed Tdap, wants to do some research on her own. Will readdress next visit  RTC 2 weeks- Tdap next visit  Sona Newman MD  OB/GYN  6/15/2018 9:53 AM

## 2018-06-15 NOTE — NURSING NOTE
Patient presents today for her prenatal check-up. She is currently 29w2d. She is declining the TDAP today as she would like to do some research first.  Belem Nixon LPN  6/15/2018  9:19 AM

## 2018-06-29 ENCOUNTER — PRENATAL OFFICE VISIT (OUTPATIENT)
Dept: OBGYN | Facility: OTHER | Age: 38
End: 2018-06-29
Attending: OBSTETRICS & GYNECOLOGY
Payer: COMMERCIAL

## 2018-06-29 VITALS — HEART RATE: 62 BPM | RESPIRATION RATE: 16 BRPM | SYSTOLIC BLOOD PRESSURE: 118 MMHG | DIASTOLIC BLOOD PRESSURE: 82 MMHG

## 2018-06-29 DIAGNOSIS — O09.93 SUPERVISION OF HIGH RISK PREGNANCY IN THIRD TRIMESTER: Primary | ICD-10-CM

## 2018-06-29 PROCEDURE — 99207 ZZC OB VISIT-NO CHARGE - GICH ONLY: CPT | Performed by: OBSTETRICS & GYNECOLOGY

## 2018-06-29 RX ORDER — BREAST PUMP
EACH MISCELLANEOUS
Qty: 1 EACH | Refills: 0 | Status: SHIPPED | OUTPATIENT
Start: 2018-06-29 | End: 2018-11-16

## 2018-06-29 ASSESSMENT — PAIN SCALES - GENERAL: PAINLEVEL: NO PAIN (0)

## 2018-06-29 NOTE — MR AVS SNAPSHOT
After Visit Summary   6/29/2018    Ann Lopez    MRN: [id number]           Patient Information     Date Of Birth          1980        Visit Information        Provider Department      6/29/2018 9:15 AM Sona Newman MD [facility]        Today's Diagnoses     Supervision of high risk pregnancy in third trimester    -  1       Follow-ups after your visit        Your next 10 appointments already scheduled     Jul 13, 2018 10:15 AM CDT   ESTABLISHED PRENATAL with Sona Newman MD   [facility]    [address]  [place] [address]   [phone]            Jul 27, 2018  9:15 AM CDT   ESTABLISHED PRENATAL with Sona Newman MD   [facility]    [address]  [place] [address]   [phone]            Aug 03, 2018  9:15 AM CDT   ESTABLISHED PRENATAL with Sona Newman MD   [facility]    [address]  [place] [address]   [phone]            Aug 10, 2018  9:00 AM CDT   ESTABLISHED PRENATAL with Sona Newman MD   [facility]    [address]  [place] [address]   [phone]            Aug 17, 2018  9:45 AM CDT   ESTABLISHED PRENATAL with Sona Newman MD   [facility] ([facility])    [address]  [place] [address]   [phone]            Aug 24, 2018  9:00 AM CDT   ESTABLISHED PRENATAL with Sona Newman MD   [facility]    [address]  [place] [address]   [phone]            Aug 31, 2018  9:15 AM CDT   ESTABLISHED PRENATAL with Sona Newman MD   [facility]     [address]  [place] [address] [phone]              Who to contact     If you have questions or need follow up information about today's clinic visit or your schedule please contact [facility] directly at [phone].  Normal or non-critical lab and imaging results will be communicated to you by Smartfieldhart, letter or phone within 4 business days after the clinic has received the results. If you do not hear from us within 7 days, please contact the clinic through Smartfieldhart or phone. If you have a critical or abnormal lab result, we will notify you by phone as soon as possible.  Submit refill requests through [employer] or call your pharmacy and they will forward the refill request to us. Please allow 3 business days for your refill to be completed.          Additional Information About Your Visit        Smartfieldhart Information     [employer] gives you secure access to your electronic health record. If you see a primary care provider, you can also send messages to your care team and make appointments. If you have questions, please call your primary care clinic.  If you do not have a primary care provider, please call [phone] and they will assist you.        Care EveryWhere ID     This is your Care EveryWhere ID. This could be used by other organizations to access your [place] medical records  [id number]        Your Vitals Were     Pulse Respirations                62 16           Blood Pressure from Last 3 Encounters:   06/29/18 118/82   06/15/18 108/70   05/18/18 112/80    Weight from Last 3 Encounters:   06/15/18 87.1 kg (192 lb)   05/18/18 85.1 kg (187 lb 11.2 oz)   04/17/18 86.9 kg (191 lb 9 oz)              Today, you had the following     No orders found for display         Today's Medication Changes          These changes are accurate as of 6/29/18  9:46 AM.  If you have any questions, ask your nurse or doctor.               Start taking these medicines.         Dose/Directions    breast pump Misc   Used for:  Supervision of high risk pregnancy in third trimester   Started by:  Sona Newman MD        Reason for need: breastfeeding. Length of need: 1 year   Quantity:  1 each   Refills:  0            Where to get your medicines      Some of these will need a paper prescription and others can be bought over the counter.  Ask your nurse if you have questions.     Bring a paper prescription for each of these medications     breast pump Misc                Primary Care Provider Office Phone # Fax #    Tanesha PARKER Del Garza [phone] [address] [facility] [address]        Equal Access to Services     [facility]: Hadii aad ku hadasho Soomaali, waaxda luqadaha, qaybta kaalmada adeegyada, shey pace haysaba mendoza . So [place] [phone].    ATENCIÓN: Si habla español, tiene a berman disposición servicios gratuitos de asistencia lingüística. Llame al [phone].    We comply with applicable federal civil rights laws and [place] laws. We do not discriminate on the basis of race, color, national origin, age, disability, sex, sexual orientation, or gender identity.            Thank you!     Thank you for choosing [facility] AND [facility]  for your care. Our goal is always to provide you with excellent care. Hearing back from our patients is one way we can continue to improve our services. Please take a few minutes to complete the written survey that you may receive in the mail after your visit with us. Thank you!             Your Updated Medication List - Protect others around you: Learn how to safely use, store and throw away your medicines at www.disposemymeds.org.          This list is accurate as of 6/29/18  9:46 AM.  Always use your most recent med list.                   Brand Name Dispense Instructions for use Diagnosis    breast pump Misc     1 each    Reason for need: breastfeeding. Length of need: 1  year    Supervision of high risk pregnancy in third trimester       CVS PRENATAL 28-0.8 MG Tabs      Take 1 tablet by mouth daily        FISH OIL PO      Take 1 capsule by mouth daily

## 2018-06-29 NOTE — NURSING NOTE
Patient presents to theclinic for Ob check at 31 weeks and 2 days.  Denies concerns.  Maria Teresa Whyte........................6/29/2018  9:13 AM

## 2018-06-29 NOTE — PROGRESS NOTES
Return OB Visit    S: Patient has been feeling good, just tired. No ctx, VB or LOF. +FM    O: /82 (BP Location: Right arm, Patient Position: Sitting, Cuff Size: Adult Regular)  Pulse 62  Resp 16  Gen: Well-appearing, NAD  See OB Flowsheet    A/P:  Ann Lopez is a 38 year old  at 31w2d by LMP c/w 6w6d US, here for return OB visit.  Remote hx of HSV: needs ppx at 36 weeks  AMA-  testing starting at 36 weeks  PCJ for baby  Plans no epidural  Breastfeeding- Rx 2018       PNC:   - Prenatal labs: Rh positive, Rubella immune. GCT 2018   - Genetics: declines  - Imaging: dating US at 6w6d. Anatomy US normal  - Immunizations: declines Tdap. Does plan to vaccinate her baby.  RTC 2 weeks    Sona Newman MD  OB/GYN  2018 9:29 AM

## 2018-07-13 ENCOUNTER — PRENATAL OFFICE VISIT (OUTPATIENT)
Dept: OBGYN | Facility: OTHER | Age: 38
End: 2018-07-13
Attending: OBSTETRICS & GYNECOLOGY
Payer: COMMERCIAL

## 2018-07-13 VITALS
SYSTOLIC BLOOD PRESSURE: 110 MMHG | DIASTOLIC BLOOD PRESSURE: 74 MMHG | BODY MASS INDEX: 26.37 KG/M2 | WEIGHT: 194.44 LBS | HEART RATE: 68 BPM

## 2018-07-13 DIAGNOSIS — O09.519 SUPERVISION OF HIGH-RISK PREGNANCY OF ELDERLY PRIMIGRAVIDA: Primary | ICD-10-CM

## 2018-07-13 PROCEDURE — 99207 ZZC OB VISIT-NO CHARGE - GICH ONLY: CPT | Performed by: OBSTETRICS & GYNECOLOGY

## 2018-07-13 ASSESSMENT — PAIN SCALES - GENERAL: PAINLEVEL: NO PAIN (0)

## 2018-07-13 NOTE — PROGRESS NOTES
Return OB Visit    S: Patient has been feeling well. No ctx, VB or LOF. +FM.    O: /74 (BP Location: Right arm, Patient Position: Sitting, Cuff Size: Adult Large)  Pulse 68  Wt 88.2 kg (194 lb 7 oz)  BMI 26.37 kg/m2  Gen: Well-appearing, NAD  See OB Flowsheet    A/P:  Ann Lopez is a 38 year old  at 33w2d by LMP c/w 6w6d US, here for return OB visit.  Remote hx of HSV: needs ppx at 36 weeks  AMA-  testing starting at 36 weeks  PCJ for baby  Plans no epidural  Breastfeeding- Rx 2018   Planning abstinence for contraception- plans hip surgery postpartum      PNC:   - Prenatal labs: Rh positive, Rubella immune. GCT 2018   - Genetics: declines  - Imaging: dating US at 6w6d. Anatomy US normal  - Immunizations: declines Tdap. Does plan to vaccinate her baby.  RTC 2 weeks    Sona Newman MD  OB/GYN  2018 10:26 AM

## 2018-07-13 NOTE — MR AVS SNAPSHOT
After Visit Summary   7/13/2018    Ann Lopez    MRN: [id number]           Patient Information     Date Of Birth          1980        Visit Information        Provider Department      7/13/2018 10:15 AM Sona Newman MD [facility]        Today's Diagnoses     Supervision of high-risk pregnancy of elderly primigravida    -  1       Follow-ups after your visit        Your next 10 appointments already scheduled     Jul 27, 2018  9:15 AM CDT   ESTABLISHED PRENATAL with Sona Newman MD   [facility]    [address]  [facility] [address]   [phone]            Aug 03, 2018  9:15 AM CDT   ESTABLISHED PRENATAL with Sona Newman MD   [facility]    [address]  [facility] [address]   [phone]            Aug 10, 2018  9:00 AM CDT   ESTABLISHED PRENATAL with Sona Newman MD   [facility]    [address]  [facility] [address]   [phone]            Aug 17, 2018  9:45 AM CDT   ESTABLISHED PRENATAL with Sona Newman MD   [facility]    [address]  [place] [address]   [phone]            Aug 24, 2018  9:00 AM CDT   ESTABLISHED PRENATAL with Sona Newman MD   [facility]    [address]  [facility] [address]   [phone]            Aug 31, 2018  9:15 AM CDT   ESTABLISHED PRENATAL with Sona Newman MD   [facility]    [address]  [facility] [address]   [phone]              Who to contact     If you have questions or need follow up information about today's clinic visit or your schedule please contact   [facility] directly at [phone].  Normal or non-critical lab and imaging results will be communicated to you by MyChart, letter or phone within 4 business days after the clinic has received the results. If you do not hear from us within 7 days, please contact the clinic through LiveAir Networkshart or phone. If you have a critical or abnormal lab result, we will notify you by phone as soon as possible.  Submit refill requests through VidaPak or call your pharmacy and they will forward the refill request to us. Please allow 3 business days for your refill to be completed.          Additional Information About Your Visit        LiveAir NetworksharExactTarget Information     VidaPak gives you secure access to your electronic health record. If you see a primary care provider, you can also send messages to your care team and make appointments. If you have questions, please call your primary care clinic.  If you do not have a primary care provider, please call [phone] and they will assist you.        Care EveryWhere ID     This is your Care EveryWhere ID. This could be used by other organizations to access your [place] medical records  [id number]        Your Vitals Were     Pulse BMI (Body Mass Index)                68 26.37 kg/m2           Blood Pressure from Last 3 Encounters:   07/13/18 110/74   06/29/18 118/82   06/15/18 108/70    Weight from Last 3 Encounters:   07/13/18 88.2 kg (194 lb 7 oz)   06/15/18 87.1 kg (192 lb)   05/18/18 85.1 kg (187 lb 11.2 oz)              Today, you had the following     No orders found for display       Primary Care Provider Office Phone # Fax #    Tanesha ELENA Garza [phone] [address]  [facility] [address]        Equal Access to Services     [facility] : Hadallyson Alberto, mary bourne, shey delarosa. So [facility] [phone].    ATENCIÓN: Si habla español, tiene a berman disposición  servicios gratuitos de asistencia lingüística. Cecil stack [phone].    We comply with applicable federal civil rights laws and [place] laws. We do not discriminate on the basis of race, color, national origin, age, disability, sex, sexual orientation, or gender identity.            Thank you!     Thank you for choosing [facility] AND \[facility]\""  for your care. Our goal is always to provide you with excellent care. Hearing back from our patients is one way we can continue to improve our services. Please take a few minutes to complete the written survey that you may receive in the mail after your visit with us. Thank you!             Your Updated Medication List - Protect others around you: Learn how to safely use, store and throw away your medicines at www.disposemymeds.org.          This list is accurate as of 7/13/18 12:35 PM.  Always use your most recent med list.                   Brand Name Dispense Instructions for use Diagnosis    breast pump Misc     1 each    Reason for need: breastfeeding. Length of need: 1 year    Supervision of high risk pregnancy in third trimester       CVS PRENATAL 28-0.8 MG Tabs      Take 1 tablet by mouth daily        FISH OIL PO      Take 1 capsule by mouth daily

## 2018-07-13 NOTE — NURSING NOTE
Patient presents today for prenatal check-up. She is currently 33w2d.  Belem Nixon LPN  7/13/2018  10:17 AM

## 2018-07-27 ENCOUNTER — PRENATAL OFFICE VISIT (OUTPATIENT)
Dept: OBGYN | Facility: OTHER | Age: 38
End: 2018-07-27
Attending: OBSTETRICS & GYNECOLOGY
Payer: COMMERCIAL

## 2018-07-27 VITALS
RESPIRATION RATE: 16 BRPM | BODY MASS INDEX: 26.47 KG/M2 | WEIGHT: 195.2 LBS | DIASTOLIC BLOOD PRESSURE: 78 MMHG | HEART RATE: 68 BPM | SYSTOLIC BLOOD PRESSURE: 116 MMHG

## 2018-07-27 DIAGNOSIS — Z86.19 HISTORY OF HERPES GENITALIS: Primary | ICD-10-CM

## 2018-07-27 PROCEDURE — 99207 ZZC OB VISIT-NO CHARGE - GICH ONLY: CPT | Performed by: OBSTETRICS & GYNECOLOGY

## 2018-07-27 RX ORDER — ACYCLOVIR 400 MG/1
400 TABLET ORAL 2 TIMES DAILY
Qty: 80 TABLET | Refills: 0 | Status: SHIPPED | OUTPATIENT
Start: 2018-07-27 | End: 2018-10-15

## 2018-07-27 ASSESSMENT — PAIN SCALES - GENERAL: PAINLEVEL: NO PAIN (0)

## 2018-07-27 NOTE — PROGRESS NOTES
Return OB Visit    S: Patient has been doing okay. Hip getting more uncomfortable. Random BH ctx, no VB or LOF. +FM    O: /78 (BP Location: Right arm, Patient Position: Sitting, Cuff Size: Adult Regular)  Pulse 68  Resp 16  Wt 88.5 kg (195 lb 3.2 oz)  Breastfeeding? No  BMI 26.47 kg/m2  Gen: Well-appearing, NAD  See OB Flowsheet    A/P:  Ann Lopez is a 38 year old  at 35w2d by LMP c/w 6w6d US, here for return OB visit.  Remote hx of HSV: needs ppx at 36 weeks- ord'd today  AMA-  testing starting at 36 weeks  PCJ for baby  Plans no epidural  Breastfeeding- Rx 2018   Planning abstinence for contraception- plans hip surgery postpartum      PNC:   - Prenatal labs: Rh positive, Rubella immune. GCT 2018   - Genetics: declines  - Imaging: dating US at 6w6d. Anatomy US normal  - Immunizations: declines Tdap. Does plan to vaccinate her baby.  RTC weekly until delivery- GBS, NST next week    Sona Newman MD  OB/GYN  2018 9:20 AM

## 2018-07-27 NOTE — MR AVS SNAPSHOT
After Visit Summary   7/27/2018    Ann Lopez    MRN: [id number]           Patient Information     Date Of Birth          1980        Visit Information        Provider Department      7/27/2018 9:15 AM Sona Newman MD [facility] [place]        Today's Diagnoses     History of herpes genitalis    -  1       Follow-ups after your visit        Your next 10 appointments already scheduled     Aug 03, 2018  9:15 AM CDT   ESTABLISHED PRENATAL with Sona Newman MD   [facility]    [address]  [place] [address]   [phone]            Aug 10, 2018  9:00 AM CDT   ESTABLISHED PRENATAL with Sona Newman MD   [facility]    [address]  [facility] [address]   [phone]            Aug 17, 2018  9:45 AM CDT   ESTABLISHED PRENATAL with Sona Newman MD   [facility]    [address]  [place] [facility] [address]   [phone]            Aug 24, 2018  9:00 AM CDT   ESTABLISHED PRENATAL with Sona Newman MD   [facility]    [address]  [place] [facility] [address]   [phone]            Aug 31, 2018  9:15 AM CDT   ESTABLISHED PRENATAL with Sona Newman MD   [facility]    [address]  [facility] [address]   [phone]              Who to contact     If you have questions or need follow up information about today's clinic visit or your schedule please contact [facility] directly at [phone].  Normal or non-critical lab and imaging results will be communicated to you by MyChart, letter or phone within 4 business days after the clinic has received the results. If you do not hear from us within 7  days, please contact the clinic through ClairMail or phone. If you have a critical or abnormal lab result, we will notify you by phone as soon as possible.  Submit refill requests through ClairMail or call your pharmacy and they will forward the refill request to us. Please allow 3 business days for your refill to be completed.          Additional Information About Your Visit        "[employer]harInternetVista Information     ClairMail gives you secure access to your electronic health record. If you see a primary care provider, you can also send messages to your care team and make appointments. If you have questions, please call your primary care clinic.  If you do not have a primary care provider, please call [phone] and they will assist you.        Care EveryWhere ID     This is your Care EveryWhere ID. This could be used by other organizations to access your [place] medical records  [id number]        Your Vitals Were     Pulse Respirations Breastfeeding? BMI (Body Mass Index)          68 16 No 26.47 kg/m2         Blood Pressure from Last 3 Encounters:   07/27/18 116/78   07/13/18 110/74   06/29/18 118/82    Weight from Last 3 Encounters:   07/27/18 88.5 kg (195 lb 3.2 oz)   07/13/18 88.2 kg (194 lb 7 oz)   06/15/18 87.1 kg (192 lb)              Today, you had the following     No orders found for display         Today's Medication Changes          These changes are accurate as of 7/27/18  9:42 AM.  If you have any questions, ask your nurse or doctor.               Start taking these medicines.        Dose/Directions    acyclovir 400 MG tablet   Commonly known as:  ZOVIRAX   Used for:  History of herpes genitalis   Started by:  Sona Newman MD        Dose:  400 mg   Take 1 tablet (400 mg) by mouth 2 times daily   Quantity:  80 tablet   Refills:  0            Where to get your medicines      These medications were sent to [facility], - [place] - [address]  [address], [place] [address]     Phone:  [phone]     acyclovir 400 MG tablet                Primary Care Provider Office Phone # Fax #    Tanesha PARKER Del Garza [phone]       [address]  [place] [address]        Equal Access to Services     [facility] : Hadii aad ku hadasho Soomaali, waaxda luqadaha, qaybta kaalmada adeegyada, waxamparo pace haykimjohanny lujan silvinobrooke mendoza . So [facility] [phone].    ATENCIÓN: Si habla español, tiene a berman disposición servicios gratuitos de asistencia lingüística. Llame al [phone].    We comply with applicable federal civil rights laws and [place] laws. We do not discriminate on the basis of race, color, national origin, age, disability, sex, sexual orientation, or gender identity.            Thank you!     Thank you for choosing [facility] AND [facility]  for your care. Our goal is always to provide you with excellent care. Hearing back from our patients is one way we can continue to improve our services. Please take a few minutes to complete the written survey that you may receive in the mail after your visit with us. Thank you!             Your Updated Medication List - Protect others around you: Learn how to safely use, store and throw away your medicines at www.disposemymeds.org.          This list is accurate as of 7/27/18  9:42 AM.  Always use your most recent med list.                   Brand Name Dispense Instructions for use Diagnosis    acyclovir 400 MG tablet    ZOVIRAX    80 tablet    Take 1 tablet (400 mg) by mouth 2 times daily    History of herpes genitalis       breast pump Misc     1 each    Reason for need: breastfeeding. Length of need: 1 year    Supervision of high risk pregnancy in third trimester       CVS PRENATAL 28-0.8 MG Tabs      Take 1 tablet by mouth daily        FISH OIL PO      Take 1 capsule by mouth daily

## 2018-07-27 NOTE — NURSING NOTE
Patient presents to theclinic for Ob check at 35 weeks  and 2 days.  Denies any concerns.  Maria Teresa Whyte........................7/27/2018  9:19 AM

## 2018-08-03 ENCOUNTER — PRENATAL OFFICE VISIT (OUTPATIENT)
Dept: OBGYN | Facility: OTHER | Age: 38
End: 2018-08-03
Attending: OBSTETRICS & GYNECOLOGY
Payer: COMMERCIAL

## 2018-08-03 VITALS
DIASTOLIC BLOOD PRESSURE: 80 MMHG | WEIGHT: 195.25 LBS | HEART RATE: 80 BPM | SYSTOLIC BLOOD PRESSURE: 110 MMHG | BODY MASS INDEX: 26.48 KG/M2

## 2018-08-03 DIAGNOSIS — O09.519 SUPERVISION OF HIGH-RISK PREGNANCY OF ELDERLY PRIMIGRAVIDA: Primary | ICD-10-CM

## 2018-08-03 PROCEDURE — 87081 CULTURE SCREEN ONLY: CPT | Performed by: OBSTETRICS & GYNECOLOGY

## 2018-08-03 PROCEDURE — 59025 FETAL NON-STRESS TEST: CPT | Performed by: OBSTETRICS & GYNECOLOGY

## 2018-08-03 PROCEDURE — 99207 ZZC OB VISIT-NO CHARGE - GICH ONLY: CPT | Performed by: OBSTETRICS & GYNECOLOGY

## 2018-08-03 ASSESSMENT — PAIN SCALES - GENERAL: PAINLEVEL: NO PAIN (0)

## 2018-08-03 NOTE — NURSING NOTE
Patient presents today for her prenatal check-up. She is currently 36w2d.  Belem Nixon LPN  8/3/2018  9:18 AM

## 2018-08-03 NOTE — PROGRESS NOTES
Return OB Visit    S: Patient has been feeling well. No ctx, VB or LOF. +FM.    O: /80 (BP Location: Right arm, Patient Position: Sitting, Cuff Size: Adult Large)  Pulse 80  Wt 88.6 kg (195 lb 4 oz)  BMI 26.48 kg/m2  Gen: Well-appearing, NAD  See OB Flowsheet    NST: 130, mod variability, + accels, no decels  [place]: quiet    A/P:  Ann Lopez is a 38 year old  at 36w2d by LMP c/w 6w6d US, here for return OB visit.  Remote hx of HSV: on ppx  AMA-  testing starting at 36 weeks  PCJ for baby  Plans no epidural  Breastfeeding- Rx 2018   Planning abstinence for contraception- plans hip surgery postpartum      PNC:   - Prenatal labs: Rh positive, Rubella immune. GCT 79, GBS 8/3/2018   - Genetics: declines  - Imaging: dating US at 6w6d. Anatomy US normal  - Immunizations: declines Tdap. Does plan to vaccinate her baby.  RTC weekly with NSTs until delivery    Sona Newman MD  OB/GYN  8/3/2018 9:16 AM

## 2018-08-03 NOTE — MR AVS SNAPSHOT
After Visit Summary   8/3/2018    Ann Lopez    MRN: [id number]           Patient Information     Date Of Birth          1980        Visit Information        Provider Department      8/3/2018 9:15 AM Sona Newman MD [facility]        Today's Diagnoses     Supervision of high-risk pregnancy of elderly primigravida    -  1       Follow-ups after your visit        Your next 10 appointments already scheduled     Aug 10, 2018  9:00 AM CDT   ESTABLISHED PRENATAL with Sona Newman MD   [facility] ([facility])    [address]  [place] [address]   [phone]            Aug 17, 2018  9:45 AM CDT   ESTABLISHED PRENATAL with Sona Newman MD   [facility] ([facility])    [address]  [place] [address]   [phone]            Aug 24, 2018  9:00 AM CDT   ESTABLISHED PRENATAL with Sona Newman MD   [facility] ([facility])    [address]  [place] [address]   [phone]            Aug 31, 2018  9:15 AM CDT   ESTABLISHED PRENATAL with Sona Newman MD   [facility] ([facility])    [address]  [place] [address]   [phone]              Who to contact     If you have questions or need follow up information about today's clinic visit or your schedule please contact [facility] directly at [phone].  Normal or non-critical lab and imaging results will be communicated to you by Stayhoundhart, letter or phone within 4 business days after the clinic has received the results. If you do not hear from us within 7 days, please contact the clinic through Stayhoundhart or phone. If you have a critical or abnormal lab result, we will notify you by phone as soon as possible.  Submit refill requests through Qloo  or call your pharmacy and they will forward the refill request to us. Please allow 3 business days for your refill to be completed.          Additional Information About Your Visit        MyChart Information     Mophiet gives you secure access to your electronic health record. If you see a primary care provider, you can also send messages to your care team and make appointments. If you have questions, please call your primary care clinic.  If you do not have a primary care provider, please call [phone] and they will assist you.        Care EveryWhere ID     This is your Care EveryWhere ID. This could be used by other organizations to access your [place] medical records  [id number]        Your Vitals Were     Pulse BMI (Body Mass Index)                80 26.48 kg/m2           Blood Pressure from Last 3 Encounters:   08/03/18 110/80   07/27/18 116/78   07/13/18 110/74    Weight from Last 3 Encounters:   08/03/18 88.6 kg (195 lb 4 oz)   07/27/18 88.5 kg (195 lb 3.2 oz)   07/13/18 88.2 kg (194 lb 7 oz)              We Performed the Following     FETAL NON-STRESS TEST     Rectal/Vag Group B Strep Culture        Primary Care Provider Office Phone # Fax #    Tanesha PARKER Del Garza [phone] [address] [facility] [address]        Equal Access to Services     ALICE ETIENNE : Hadii aad ku hadasho Sodayna, waaxda luqadaha, qaybta kaalmada adeegyada, shey mendoza . So [facility] [phone].    ATENCIÓN: Si habla español, tiene a berman disposición servicios gratuitos de asistencia lingüística. Llame al [phone].    We comply with applicable federal civil rights laws and [place] laws. We do not discriminate on the basis of race, color, national origin, age, disability, sex, sexual orientation, or gender identity.            Thank you!     Thank you for choosing [facility] AND [facility]  for your care. Our goal is always to provide you with  excellent care. Hearing back from our patients is one way we can continue to improve our services. Please take a few minutes to complete the written survey that you may receive in the mail after your visit with us. Thank you!             Your Updated Medication List - Protect others around you: Learn how to safely use, store and throw away your medicines at www.disposemymeds.org.          This list is accurate as of 8/3/18 10:22 AM.  Always use your most recent med list.                   Brand Name Dispense Instructions for use Diagnosis    acyclovir 400 MG tablet    ZOVIRAX    80 tablet    Take 1 tablet (400 mg) by mouth 2 times daily    History of herpes genitalis       breast pump Misc     1 each    Reason for need: breastfeeding. Length of need: 1 year    Supervision of high risk pregnancy in third trimester       CVS PRENATAL 28-0.8 MG Tabs      Take 1 tablet by mouth daily        FISH OIL PO      Take 1 capsule by mouth daily

## 2018-08-05 LAB
BACTERIA SPEC CULT: NORMAL
SPECIMEN SOURCE: NORMAL

## 2018-08-10 ENCOUNTER — PRENATAL OFFICE VISIT (OUTPATIENT)
Dept: OBGYN | Facility: OTHER | Age: 38
End: 2018-08-10
Attending: OBSTETRICS & GYNECOLOGY
Payer: COMMERCIAL

## 2018-08-10 VITALS
WEIGHT: 195.13 LBS | HEART RATE: 64 BPM | DIASTOLIC BLOOD PRESSURE: 88 MMHG | SYSTOLIC BLOOD PRESSURE: 112 MMHG | BODY MASS INDEX: 26.46 KG/M2

## 2018-08-10 DIAGNOSIS — O09.519 SUPERVISION OF HIGH-RISK PREGNANCY OF ELDERLY PRIMIGRAVIDA: Primary | ICD-10-CM

## 2018-08-10 PROCEDURE — 99207 ZZC OB VISIT-NO CHARGE - GICH ONLY: CPT | Performed by: OBSTETRICS & GYNECOLOGY

## 2018-08-10 PROCEDURE — 59025 FETAL NON-STRESS TEST: CPT | Performed by: OBSTETRICS & GYNECOLOGY

## 2018-08-10 NOTE — PROGRESS NOTES
Return OB Visit    S: Patient is feeling well, just ready to be done. No ctx, VB or LOF. +FM    O: /88  Pulse 64  Wt 88.5 kg (195 lb 2 oz)  BMI 26.46 kg/m2  Gen: Well-appearing, NAD  See OB Flowsheet    NST: 130, mod variability, + accels, no decels    A/P:  Ann Lopez is a 38 year old  at 37w2d by LMP c/w 6w6d US, here for return OB visit.  Remote hx of HSV: on ppx  AMA-  testing starting at 36 weeks  PCJ for baby  Plans no epidural  Breastfeeding- Rx 2018   Planning abstinence for contraception- plans hip surgery postpartum      PNC:   - Prenatal labs: Rh positive, Rubella immune. GCT 79, GBS 8/3/2018   - Genetics: declines  - Imaging: dating US at 6w6d. Anatomy US normal  - Immunizations: declines Tdap. Does plan to vaccinate her baby.  RTC weekly with NSTs until delivery    Sona Newman MD  OB/GYN  8/10/2018 9:17 AM

## 2018-08-10 NOTE — MR AVS SNAPSHOT
After Visit Summary   8/10/2018    Ann Lopez    MRN: [id number]           Patient Information     Date Of Birth          1980        Visit Information        Provider Department      8/10/2018 9:00 AM Sona Newman MD [facility]        Today's Diagnoses     Supervision of high-risk pregnancy of elderly primigravida    -  1       Follow-ups after your visit        Your next 10 appointments already scheduled     Aug 17, 2018  9:45 AM CDT   ESTABLISHED PRENATAL with Sona Newman MD   [facility] ([facility])    [address]  [place] [address]   [phone]            Aug 24, 2018  9:00 AM CDT   ESTABLISHED PRENATAL with Sona Newman MD   [facility] ([facility])    [address]  [place] [address]   [phone]            Aug 31, 2018  9:15 AM CDT   ESTABLISHED PRENATAL with Sona Newman MD   [facility] ([facility])    [address]  [place] [address]   [phone]              Who to contact     If you have questions or need follow up information about today's clinic visit or your schedule please contact [facility] directly at [phone].  Normal or non-critical lab and imaging results will be communicated to you by MyChart, letter or phone within 4 business days after the clinic has received the results. If you do not hear from us within 7 days, please contact the clinic through Solazymehart or phone. If you have a critical or abnormal lab result, we will notify you by phone as soon as possible.  Submit refill requests through SA Ignite or call your pharmacy and they will forward the refill request to us. Please allow 3 business days for your refill to be completed.          Additional Information About Your Visit        MyChart Information     [facility]  gives you secure access to your electronic health record. If you see a primary care provider, you can also send messages to your care team and make appointments. If you have questions, please call your primary care clinic.  If you do not have a primary care provider, please call [phone] and they will assist you.        Care EveryWhere ID     This is your Care EveryWhere ID. This could be used by other organizations to access your [place] medical records  [id number]        Your Vitals Were     Pulse BMI (Body Mass Index)                64 26.46 kg/m2           Blood Pressure from Last 3 Encounters:   08/10/18 112/88   08/03/18 110/80   07/27/18 116/78    Weight from Last 3 Encounters:   08/10/18 88.5 kg (195 lb 2 oz)   08/03/18 88.6 kg (195 lb 4 oz)   07/27/18 88.5 kg (195 lb 3.2 oz)              We Performed the Following     FETAL NON-STRESS TEST        Primary Care Provider Office Phone # Fax #    Tanesha PARKER Del Garza [phone]       [address] [facility] [address]        Equal Access to Services     [facility] : Hadii aad ku hadasho Sodayna, waaxda luqadaha, qaybta kaalmada lea, shey mendoza . So [facility] [phone].    ATENCIÓN: Si habla español, tiene a berman disposición servicios gratuitos de asistencia lingüística. [facility] [phone].    We comply with applicable federal civil rights laws and [place] laws. We do not discriminate on the basis of race, color, national origin, age, disability, sex, sexual orientation, or gender identity.            Thank you!     Thank you for choosing [facility] AND [facility]  for your care. Our goal is always to provide you with excellent care. Hearing back from our patients is one way we can continue to improve our services. Please take a few minutes to complete the written survey that you may receive in the mail after your visit with us. Thank you!             Your Updated Medication  List - Protect others around you: Learn how to safely use, store and throw away your medicines at www.disposemymeds.org.          This list is accurate as of 8/10/18  9:30 AM.  Always use your most recent med list.                   Brand Name Dispense Instructions for use Diagnosis    acyclovir 400 MG tablet    ZOVIRAX    80 tablet    Take 1 tablet (400 mg) by mouth 2 times daily    History of herpes genitalis       breast pump Misc     1 each    Reason for need: breastfeeding. Length of need: 1 year    Supervision of high risk pregnancy in third trimester       CVS PRENATAL 28-0.8 MG Tabs      Take 1 tablet by mouth daily        FISH OIL PO      Take 1 capsule by mouth daily

## 2018-08-17 ENCOUNTER — PRENATAL OFFICE VISIT (OUTPATIENT)
Dept: OBGYN | Facility: OTHER | Age: 38
End: 2018-08-17
Attending: OBSTETRICS & GYNECOLOGY
Payer: COMMERCIAL

## 2018-08-17 VITALS
WEIGHT: 197.38 LBS | BODY MASS INDEX: 26.77 KG/M2 | SYSTOLIC BLOOD PRESSURE: 112 MMHG | DIASTOLIC BLOOD PRESSURE: 68 MMHG | HEART RATE: 68 BPM

## 2018-08-17 DIAGNOSIS — O09.519 SUPERVISION OF HIGH-RISK PREGNANCY OF ELDERLY PRIMIGRAVIDA: Primary | ICD-10-CM

## 2018-08-17 PROCEDURE — 59025 FETAL NON-STRESS TEST: CPT | Performed by: OBSTETRICS & GYNECOLOGY

## 2018-08-17 PROCEDURE — 99207 ZZC OB VISIT-NO CHARGE - GICH ONLY: CPT | Performed by: OBSTETRICS & GYNECOLOGY

## 2018-08-17 ASSESSMENT — PAIN SCALES - GENERAL: PAINLEVEL: NO PAIN (0)

## 2018-08-17 NOTE — NURSING NOTE
Patient presents today for her prenatal check-up. She is currently 38w2d.  Belem Nixon LPN  8/17/2018  9:56 AM           Chief Complaint   Patient presents with     Prenatal Care     38w2d       Initial /68 (BP Location: Right arm, Patient Position: Sitting, Cuff Size: Adult Regular)  Pulse 68  Wt 89.5 kg (197 lb 6 oz)  BMI 26.77 kg/m2 Estimated body mass index is 26.77 kg/(m^2) as calculated from the following:    Height as of 1/9/18: 1.829 m (6').    Weight as of this encounter: 89.5 kg (197 lb 6 oz).  Medication Reconciliation: complete    Belem Nixon LPN

## 2018-08-17 NOTE — PROGRESS NOTES
Return OB Visit    S: Patient has been feeling well. No ctx, VB or LOF. +FM.    O: /68 (BP Location: Right arm, Patient Position: Sitting, Cuff Size: Adult Regular)  Pulse 68  Wt 89.5 kg (197 lb 6 oz)  BMI 26.77 kg/m2  Gen: Well-appearing, NAD  See OB Flowsheet    NST: 135, mod variability, + accels, no decels  [place]: quiet    A/P:  Ann Lopez is a 38 year old  at 38w2d by LMP c/w 6w6d US, here for return OB visit.  Remote hx of HSV: on ppx  AMA-  testing starting at 36 weeks  PCJ for baby  Plans no epidural  Breastfeeding- Rx 2018   Planning abstinence for contraception- plans hip surgery postpartum      PNC:   - Prenatal labs: Rh positive, Rubella immune. GCT 79, GBS negative  - Genetics: declines  - Imaging: dating US at 6w6d. Anatomy US normal  - Immunizations: declines Tdap. Does plan to vaccinate her baby.  RTC weekly with NSTs until delivery- discussed exam next visit and scheduling induction if still not delivered.    Sona Newman MD  OB/GYN  2018 9:50 AM

## 2018-08-17 NOTE — MR AVS SNAPSHOT
After Visit Summary   8/17/2018    Ann Lopez    MRN: [id number]           Patient Information     Date Of Birth          1980        Visit Information        Provider Department      8/17/2018 9:45 AM Sona Newman MD [facility]        Today's Diagnoses     Supervision of high-risk pregnancy of elderly primigravida    -  1       Follow-ups after your visit        Your next 10 appointments already scheduled     Aug 24, 2018  9:00 AM CDT   ESTABLISHED PRENATAL with Sona Newman MD   [facility] ([facility])    [address]  [place] [address]   [phone]            Aug 31, 2018  9:15 AM CDT   ESTABLISHED PRENATAL with Sona Newman MD   [facility] ([facility])    [address]  [place] [address]   [phone]              Who to contact     If you have questions or need follow up information about today's clinic visit or your schedule please contact [facility] directly at [phone].  Normal or non-critical lab and imaging results will be communicated to you by Assurzhart, letter or phone within 4 business days after the clinic has received the results. If you do not hear from us within 7 days, please contact the clinic through [employer]t or phone. If you have a critical or abnormal lab result, we will notify you by phone as soon as possible.  Submit refill requests through Cytonics or call your pharmacy and they will forward the refill request to us. Please allow 3 business days for your refill to be completed.          Additional Information About Your Visit        Assurzhart Information     Cytonics gives you secure access to your electronic health record. If you see a primary care provider, you can also send messages to your care team and make appointments. If you have questions, please call your primary care clinic.   If you do not have a primary care provider, please call [phone] and they will assist you.        Care EveryWhere ID     This is your Care EveryWhere ID. This could be used by other organizations to access your [place] medical records  [id number]        Your Vitals Were     Pulse BMI (Body Mass Index)                68 26.77 kg/m2           Blood Pressure from Last 3 Encounters:   08/17/18 112/68   08/10/18 112/88   08/03/18 110/80    Weight from Last 3 Encounters:   08/17/18 89.5 kg (197 lb 6 oz)   08/10/18 88.5 kg (195 lb 2 oz)   08/03/18 88.6 kg (195 lb 4 oz)              We Performed the Following     FETAL NON-STRESS TEST        Primary Care Provider Office Phone # Fax #    Tanesha PARKER Del Garza [phone]       [address] [facility] [address]        Equal Access to Services     [facility] : Hadii tyrone alejoo Sodayna, waaxda luqadaha, qaybta kaalmada adeangieyatank, shey mendoza . So [facility] [phone].    ATENCIÓN: Si johannala español, tiene a berman disposición servicios gratuitos de asistencia lingüística. Llame al [phone].    We comply with applicable federal civil rights laws and [place] laws. We do not discriminate on the basis of race, color, national origin, age, disability, sex, sexual orientation, or gender identity.            Thank you!     Thank you for choosing [facility] AND \[facility]\""  for your care. Our goal is always to provide you with excellent care. Hearing back from our patients is one way we can continue to improve our services. Please take a few minutes to complete the written survey that you may receive in the mail after your visit with us. Thank you!             Your Updated Medication List - Protect others around you: Learn how to safely use, store and throw away your medicines at www.disposemymeds.org.          This list is accurate as of 8/17/18 12:49 PM.  Always use your most recent med list.                    Brand Name Dispense Instructions for use Diagnosis    acyclovir 400 MG tablet    ZOVIRAX    80 tablet    Take 1 tablet (400 mg) by mouth 2 times daily    History of herpes genitalis       breast pump Misc     1 each    Reason for need: breastfeeding. Length of need: 1 year    Supervision of high risk pregnancy in third trimester       CVS PRENATAL 28-0.8 MG Tabs      Take 1 tablet by mouth daily        FISH OIL PO      Take 1 capsule by mouth daily

## 2018-08-24 ENCOUNTER — PRENATAL OFFICE VISIT (OUTPATIENT)
Dept: OBGYN | Facility: OTHER | Age: 38
End: 2018-08-24
Attending: OBSTETRICS & GYNECOLOGY
Payer: COMMERCIAL

## 2018-08-24 VITALS
SYSTOLIC BLOOD PRESSURE: 114 MMHG | HEART RATE: 80 BPM | BODY MASS INDEX: 26.87 KG/M2 | DIASTOLIC BLOOD PRESSURE: 80 MMHG | WEIGHT: 198.13 LBS

## 2018-08-24 DIAGNOSIS — O09.519 SUPERVISION OF HIGH-RISK PREGNANCY OF ELDERLY PRIMIGRAVIDA: Primary | ICD-10-CM

## 2018-08-24 PROCEDURE — 99207 ZZC OB VISIT-NO CHARGE - GICH ONLY: CPT | Performed by: OBSTETRICS & GYNECOLOGY

## 2018-08-24 ASSESSMENT — PAIN SCALES - GENERAL: PAINLEVEL: NO PAIN (0)

## 2018-08-24 NOTE — PROGRESS NOTES
Return OB Visit    S: Patient has been feeling well. No ctx, VB or LOF. +FM.     O: /80 (BP Location: Right arm, Patient Position: Sitting, Cuff Size: Adult Large)  Pulse 80  Wt 89.9 kg (198 lb 2 oz)  BMI 26.87 kg/m2  Gen: Well-appearing, NAD  See OB Flowsheet    NST: 135, mod variability, + accels, no decels  [place]: quiet    A/P:  Ann Lopez is a 38 year old  at 39w2d by LMP c/w 6w6d US, here for return OB visit.  Remote hx of HSV: on ppx  AMA-  testing starting at 36 weeks  PCJ for baby  Plans no epidural  Breastfeeding- Rx 2018   Planning abstinence for contraception- plans hip surgery postpartum      PNC:   - Prenatal labs: Rh positive, Rubella immune. GCT 79, GBS negative  - Genetics: declines  - Imaging: dating US at 6w6d. Anatomy US normal  - Immunizations: declines Tdap. Does plan to vaccinate her baby.  IOL scheduled for  in the evening for ripening    Sona Newman MD  OB/GYN  2018 9:17 AM

## 2018-08-24 NOTE — NURSING NOTE
Patient presents today for her prenatal check-up. She is currently 39w2d.  Belem Nixon LPN  8/24/2018  9:03 AM           Chief Complaint   Patient presents with     Prenatal Care     39w2d       Initial /80 (BP Location: Right arm, Patient Position: Sitting, Cuff Size: Adult Large)  Pulse 80  Wt 89.9 kg (198 lb 2 oz)  BMI 26.87 kg/m2 Estimated body mass index is 26.87 kg/(m^2) as calculated from the following:    Height as of 1/9/18: 1.829 m (6').    Weight as of this encounter: 89.9 kg (198 lb 2 oz).  Medication Reconciliation: complete    Belem Nixon LPN

## 2018-08-29 ENCOUNTER — HOSPITAL ENCOUNTER (INPATIENT)
Facility: OTHER | Age: 38
LOS: 5 days | Discharge: HOME | End: 2018-09-03
Attending: OBSTETRICS & GYNECOLOGY | Admitting: OBSTETRICS & GYNECOLOGY
Payer: COMMERCIAL

## 2018-08-29 DIAGNOSIS — Z98.891 S/P CESAREAN SECTION: Primary | ICD-10-CM

## 2018-08-29 PROBLEM — O09.513 ADVANCED MATERNAL AGE, 1ST PREGNANCY, THIRD TRIMESTER: Status: ACTIVE | Noted: 2018-08-29

## 2018-08-29 LAB
ABO + RH BLD: NORMAL
ABO + RH BLD: NORMAL
BASOPHILS # BLD AUTO: 0 10E9/L (ref 0–0.2)
BASOPHILS NFR BLD AUTO: 0.4 %
DIFFERENTIAL METHOD BLD: ABNORMAL
EOSINOPHIL # BLD AUTO: 0 10E9/L (ref 0–0.7)
EOSINOPHIL NFR BLD AUTO: 0.5 %
ERYTHROCYTE [DISTWIDTH] IN BLOOD BY AUTOMATED COUNT: 13 % (ref 10–15)
HCT VFR BLD AUTO: 33.5 % (ref 35–47)
HGB BLD-MCNC: 11.7 G/DL (ref 11.7–15.7)
IMM GRANULOCYTES # BLD: 0.1 10E9/L (ref 0–0.4)
IMM GRANULOCYTES NFR BLD: 1.1 %
LYMPHOCYTES # BLD AUTO: 2.8 10E9/L (ref 0.8–5.3)
LYMPHOCYTES NFR BLD AUTO: 34.1 %
MCH RBC QN AUTO: 35.8 PG (ref 26.5–33)
MCHC RBC AUTO-ENTMCNC: 34.9 G/DL (ref 31.5–36.5)
MCV RBC AUTO: 102 FL (ref 78–100)
MONOCYTES # BLD AUTO: 0.6 10E9/L (ref 0–1.3)
MONOCYTES NFR BLD AUTO: 7.7 %
NEUTROPHILS # BLD AUTO: 4.6 10E9/L (ref 1.6–8.3)
NEUTROPHILS NFR BLD AUTO: 56.2 %
PLATELET # BLD AUTO: 132 10E9/L (ref 150–450)
RBC # BLD AUTO: 3.27 10E12/L (ref 3.8–5.2)
SPECIMEN EXP DATE BLD: NORMAL
WBC # BLD AUTO: 8.2 10E9/L (ref 4–11)

## 2018-08-29 PROCEDURE — 85025 COMPLETE CBC W/AUTO DIFF WBC: CPT | Performed by: OBSTETRICS & GYNECOLOGY

## 2018-08-29 PROCEDURE — 25000132 ZZH RX MED GY IP 250 OP 250 PS 637: Performed by: OBSTETRICS & GYNECOLOGY

## 2018-08-29 PROCEDURE — 36415 COLL VENOUS BLD VENIPUNCTURE: CPT | Performed by: OBSTETRICS & GYNECOLOGY

## 2018-08-29 PROCEDURE — 12000027 ZZH R&B OB

## 2018-08-29 PROCEDURE — 25000128 H RX IP 250 OP 636: Performed by: OBSTETRICS & GYNECOLOGY

## 2018-08-29 PROCEDURE — 86900 BLOOD TYPING SEROLOGIC ABO: CPT | Performed by: OBSTETRICS & GYNECOLOGY

## 2018-08-29 PROCEDURE — 86780 TREPONEMA PALLIDUM: CPT | Performed by: OBSTETRICS & GYNECOLOGY

## 2018-08-29 PROCEDURE — 86901 BLOOD TYPING SEROLOGIC RH(D): CPT | Performed by: OBSTETRICS & GYNECOLOGY

## 2018-08-29 RX ORDER — TERBUTALINE SULFATE 1 MG/ML
0.25 INJECTION, SOLUTION SUBCUTANEOUS
Status: DISCONTINUED | OUTPATIENT
Start: 2018-08-29 | End: 2018-09-01

## 2018-08-29 RX ORDER — HYDROXYZINE PAMOATE 25 MG/1
100 CAPSULE ORAL
Status: DISCONTINUED | OUTPATIENT
Start: 2018-08-29 | End: 2018-09-01

## 2018-08-29 RX ORDER — ACETAMINOPHEN 325 MG/1
650 TABLET ORAL EVERY 4 HOURS PRN
Status: DISCONTINUED | OUTPATIENT
Start: 2018-08-29 | End: 2018-09-01

## 2018-08-29 RX ORDER — ONDANSETRON 2 MG/ML
4 INJECTION INTRAMUSCULAR; INTRAVENOUS EVERY 6 HOURS PRN
Status: DISCONTINUED | OUTPATIENT
Start: 2018-08-29 | End: 2018-09-01

## 2018-08-29 RX ORDER — SODIUM CHLORIDE, SODIUM LACTATE, POTASSIUM CHLORIDE, CALCIUM CHLORIDE 600; 310; 30; 20 MG/100ML; MG/100ML; MG/100ML; MG/100ML
INJECTION, SOLUTION INTRAVENOUS CONTINUOUS
Status: DISCONTINUED | OUTPATIENT
Start: 2018-08-29 | End: 2018-09-01

## 2018-08-29 RX ORDER — METHYLERGONOVINE MALEATE 0.2 MG/ML
200 INJECTION INTRAVENOUS
Status: DISCONTINUED | OUTPATIENT
Start: 2018-08-29 | End: 2018-09-01

## 2018-08-29 RX ORDER — CARBOPROST TROMETHAMINE 250 UG/ML
250 INJECTION, SOLUTION INTRAMUSCULAR
Status: DISCONTINUED | OUTPATIENT
Start: 2018-08-29 | End: 2018-09-01

## 2018-08-29 RX ORDER — MISOPROSTOL 100 UG/1
25 TABLET ORAL EVERY 4 HOURS PRN
Status: DISCONTINUED | OUTPATIENT
Start: 2018-08-29 | End: 2018-08-29

## 2018-08-29 RX ORDER — OXYCODONE AND ACETAMINOPHEN 5; 325 MG/1; MG/1
1 TABLET ORAL
Status: DISCONTINUED | OUTPATIENT
Start: 2018-08-29 | End: 2018-09-01

## 2018-08-29 RX ORDER — OXYTOCIN 10 [USP'U]/ML
10 INJECTION, SOLUTION INTRAMUSCULAR; INTRAVENOUS
Status: DISCONTINUED | OUTPATIENT
Start: 2018-08-29 | End: 2018-09-01

## 2018-08-29 RX ORDER — IBUPROFEN 400 MG/1
800 TABLET, FILM COATED ORAL
Status: DISCONTINUED | OUTPATIENT
Start: 2018-08-29 | End: 2018-09-01

## 2018-08-29 RX ORDER — MORPHINE SULFATE 10 MG/ML
10 INJECTION, SOLUTION INTRAMUSCULAR; INTRAVENOUS
Status: DISCONTINUED | OUTPATIENT
Start: 2018-08-29 | End: 2018-09-01

## 2018-08-29 RX ORDER — NALOXONE HYDROCHLORIDE 0.4 MG/ML
.1-.4 INJECTION, SOLUTION INTRAMUSCULAR; INTRAVENOUS; SUBCUTANEOUS
Status: DISCONTINUED | OUTPATIENT
Start: 2018-08-29 | End: 2018-09-01

## 2018-08-29 RX ORDER — MISOPROSTOL 100 UG/1
25 TABLET ORAL
Status: DISCONTINUED | OUTPATIENT
Start: 2018-08-29 | End: 2018-08-30

## 2018-08-29 RX ADMIN — Medication 25 MCG: at 19:55

## 2018-08-29 RX ADMIN — SODIUM CHLORIDE, SODIUM LACTATE, POTASSIUM CHLORIDE, AND CALCIUM CHLORIDE: 600; 310; 30; 20 INJECTION, SOLUTION INTRAVENOUS at 19:45

## 2018-08-29 RX ADMIN — Medication 25 MCG: at 22:10

## 2018-08-29 ASSESSMENT — ACTIVITIES OF DAILY LIVING (ADL)
SWALLOWING: 0-->SWALLOWS FOODS/LIQUIDS WITHOUT DIFFICULTY
FALL_HISTORY_WITHIN_LAST_SIX_MONTHS: NO
TOILETING: 0-->INDEPENDENT
DRESS: 0-->INDEPENDENT
RETIRED_EATING: 0-->INDEPENDENT
AMBULATION: 0-->INDEPENDENT
TRANSFERRING: 0-->INDEPENDENT
COGNITION: 0 - NO COGNITION ISSUES REPORTED
RETIRED_COMMUNICATION: 0-->UNDERSTANDS/COMMUNICATES WITHOUT DIFFICULTY
BATHING: 0-->INDEPENDENT

## 2018-08-29 NOTE — LETTER
[facility]  [address]  [place] [address]          September 3, 2018    RE:  Ann Lopez                                                                                                                                                       [address]  [facility] [address]            To whom it may concern:    Ann Lopez delivered her infant by  section on 18. Her  needs one week off work to help care for his wife.    Sincerely,            Sona Newman MD

## 2018-08-29 NOTE — PLAN OF CARE
Induction of Labor Admission  Ann Lopez  MRN: [id number]  Gestational Age: 40w0d      Ann Lopez is admitted for induction of labor with cervical ripening balloon and misoprostol.  Denies contractions. Denies pain. Denies bleeding and LOF.     Dr. Newman at bedside, completed a bedside ultrasound, and vaginal exam. Cervical balloon inserted with speculum. Intrapartum orders initiated.      FHT: 140  Uterine Assessment: No contractions.    Patient is alert and oriented X 3,Patient oriented to room, unit, hourly rounding, and plan of care.  Call light within reach. Explained admission packet with patient bill of rights brochure. Will continue to monitor and document as needed.     Yesica Young RN, IBCLC

## 2018-08-29 NOTE — LETTER
[facility]  [address]  [place] [address]          September 3, 2018    RE:  Ann Lopez                                                                                                                                                       [address]   [facility] [address]          To whom it may concern:    Ann Lopez delivered her infant by  section on 18. Her  needs one week off work to help care for his wife due to lifting restrictions.    Sincerely,            Sona Newman MD

## 2018-08-29 NOTE — IP AVS SNAPSHOT
MRN:[id number]                      After Visit Summary   8/29/2018    Ann Lopez    MRN: [id number]           Thank you!     Thank you for choosing [facility] for your care. Our goal is always to provide you with excellent care. Hearing back from our patients is one way we can continue to improve our services. Please take a few minutes to complete the written survey that you may receive in the mail after you visit with us. Thank you!        Patient Information     Date Of Birth          1980        Designated Caregiver       Most Recent Value    Caregiver    Will someone help with your care after discharge? no    Name of designated caregiver Juan      About your hospital stay     You were admitted on:  August 29, 2018 You last received care in the:  [facility]    You were discharged on:  September 3, 2018        Reason for your hospital stay       Maternity care                  Who to Call     For medical emergencies, please call 911.  For non-urgent questions about your medical care, please call your primary care provider or clinic, [phone]  For questions related to your surgery, please call your surgery clinic        Attending Provider     Provider Specialty    Sona Newman MD OB/Gyn       Primary Care Provider Office Phone # Fax #    Tanesha PARKER Del Garza [phone]      After Care Instructions     Activity       Review discharge instructions            Diet       Resume previous diet            Discharge Instructions - Postpartum visit       Schedule postpartum visit with your provider and return to clinic in 2 and 6 weeks.                  Your next 10 appointments already scheduled     Sep 05, 2018  1:00 PM CDT   Consult HOD with  LACTATION NURSE   [facility] ([facility])    [address]  [place] [address] [phone]              Pending Results     No orders  found from 8/27/2018 to 8/30/2018.            Statement of Approval     Ordered          09/03/18 0931  I have reviewed and agree with all the recommendations and orders detailed in this document.  EFFECTIVE NOW     Approved and electronically signed by:  Sona Newman MD             Admission Information     Date & Time Provider Department Dept. Phone    8/29/2018 Sona Newman MD [facility] [phone]      Your Vitals Were     Blood Pressure Pulse Temperature Respirations Pulse Oximetry       120/80 (BP Location: Right arm) 79 97  F (36.1  C) (Temporal) 16 97%       MyChart Information     [employer] gives you secure access to your electronic health record. If you see a primary care provider, you can also send messages to your care team and make appointments. If you have questions, please call your primary care clinic.  If you do not have a primary care provider, please call [phone] and they will assist you.        Care EveryWhere ID     This is your Care EveryWhere ID. This could be used by other organizations to access your [place] medical records  [id number]        Equal Access to Services     [facility] : Hadii tyrone combs Sodayna, waaxda luqadaha, qaybta kaalmatank tate, shey mendoza . So [facility] [phone].    ATENCIÓN: Si habla español, tiene a berman disposición servicios gratuitos de asistencia lingüística. Llame al [phone].    We comply with applicable federal civil rights laws and [place] laws. We do not discriminate on the basis of race, color, national origin, age, disability, sex, sexual orientation, or gender identity.               Review of your medicines      START taking        Dose / Directions    oxyCODONE-acetaminophen 5-325 MG per tablet   Commonly known as:  PERCOCET        Dose:  1-2 tablet   Take 1-2 tablets by mouth every 4 hours as needed for other (pain control or improvement in physical function. Hold dose  for analgesic side effects.)   Quantity:  10 tablet   Refills:  0         CONTINUE these medicines which have NOT CHANGED        Dose / Directions    acyclovir 400 MG tablet   Commonly known as:  ZOVIRAX   Used for:  History of herpes genitalis        Dose:  400 mg   Take 1 tablet (400 mg) by mouth 2 times daily   Quantity:  80 tablet   Refills:  0       breast pump Misc   Used for:  Supervision of high risk pregnancy in third trimester        Reason for need: breastfeeding. Length of need: 1 year   Quantity:  1 each   Refills:  0       [employer] PRENATAL 28-0.8 MG Tabs        Dose:  1 tablet   Take 1 tablet by mouth daily   Refills:  0       FISH OIL PO        Dose:  1 capsule   Take 1 capsule by mouth daily   Refills:  0            Where to get your medicines      Some of these will need a paper prescription and others can be bought over the counter. Ask your nurse if you have questions.     Bring a paper prescription for each of these medications     oxyCODONE-acetaminophen 5-325 MG per tablet                Protect others around you: Learn how to safely use, store and throw away your medicines at www.disposemymeds.org.        Information about OPIOIDS     PRESCRIPTION OPIOIDS: WHAT YOU NEED TO KNOW   We gave you an opioid (narcotic) pain medicine. It is important to manage your pain, but opioids are not always the best choice. You should first try all the other options your care team gave you. Take this medicine for as short a time (and as few doses) as possible.    Some activities can increase your pain, such as bandage changes or therapy sessions. It may help to take your pain medicine 30 to 60 minutes before these activities. Reduce your stress by getting enough sleep, working on hobbies you enjoy and practicing relaxation or meditation. Talk to your care team about ways to manage your pain beyond prescription opioids.    These medicines have risks:    DO NOT drive when on new or higher doses of pain medicine.  These medicines can affect your alertness and reaction times, and you could be arrested for driving under the influence (DUI). If you need to use opioids long-term, talk to your care team about driving.    DO NOT operate heavy machinery    DO NOT do any other dangerous activities while taking these medicines.    DO NOT drink any alcohol while taking these medicines.     If the opioid prescribed includes acetaminophen, DO NOT take with any other medicines that contain acetaminophen. Read all labels carefully. Look for the word  acetaminophen  or  Tylenol.  Ask your pharmacist if you have questions or are unsure.    You can get addicted to pain medicines, especially if you have a history of addiction (chemical, alcohol or substance dependence). Talk to your care team about ways to reduce this risk.    All opioids tend to cause constipation. Drink plenty of water and eat foods that have a lot of fiber, such as fruits, vegetables, prune juice, apple juice and high-fiber cereal. Take a laxative (Miralax, milk of magnesia, Colace, Senna) if you don t move your bowels at least every other day. Other side effects include upset stomach, sleepiness, dizziness, throwing up, tolerance (needing more of the medicine to have the same effect), physical dependence and slowed breathing.    Store your pills in a secure place, locked if possible. We will not replace any lost or stolen medicine. If you don t finish your medicine, please throw away (dispose) as directed by your pharmacist. The [place] Pollution [employer] has more information about safe disposal: https://www.pca.[facility].[place].us/living-green/managing-unwanted-medications             Medication List: This is a list of all your medications and when to take them. Check marks below indicate your daily home schedule. Keep this list as a reference.      Medications           Morning Afternoon Evening Bedtime As Needed    acyclovir 400 MG tablet   Commonly known as:  ZOVIRAX    Take 1 tablet (400 mg) by mouth 2 times daily                                breast pump Misc   Reason for need: breastfeeding. Length of need: 1 year                                [employer] PRENATAL 28-0.8 MG Tabs   Take 1 tablet by mouth daily                                FISH OIL PO   Take 1 capsule by mouth daily                                oxyCODONE-acetaminophen 5-325 MG per tablet   Commonly known as:  PERCOCET   Take 1-2 tablets by mouth every 4 hours as needed for other (pain control or improvement in physical function. Hold dose for analgesic side effects.)

## 2018-08-29 NOTE — IP AVS SNAPSHOT
[facility]    [address]    [place] [address]    Phone:  [phone]    Fax:  [phone]                                       After Visit Summary   8/29/2018    Ann Lopez    MRN: [id number]           After Visit Summary Signature Page     I have received my discharge instructions, and my questions have been answered. I have discussed any challenges I see with this plan with the nurse or doctor.    ..........................................................................................................................................  Patient/Patient Representative Signature      ..........................................................................................................................................  Patient Representative Print Name and Relationship to Patient    ..................................................               ................................................  Date                                            Time    ..........................................................................................................................................  Reviewed by Signature/Title    ...................................................              ..............................................  Date                                                            Time          22EPIC Rev 08/18

## 2018-08-30 PROCEDURE — 25000128 H RX IP 250 OP 636: Performed by: OBSTETRICS & GYNECOLOGY

## 2018-08-30 PROCEDURE — 25000132 ZZH RX MED GY IP 250 OP 250 PS 637: Performed by: OBSTETRICS & GYNECOLOGY

## 2018-08-30 PROCEDURE — 12000027 ZZH R&B OB

## 2018-08-30 RX ORDER — MISOPROSTOL 100 UG/1
25 TABLET ORAL
Status: DISCONTINUED | OUTPATIENT
Start: 2018-08-30 | End: 2018-09-01

## 2018-08-30 RX ADMIN — SODIUM CHLORIDE, SODIUM LACTATE, POTASSIUM CHLORIDE, AND CALCIUM CHLORIDE 125 ML/HR: 600; 310; 30; 20 INJECTION, SOLUTION INTRAVENOUS at 03:46

## 2018-08-30 RX ADMIN — Medication 25 MCG: at 18:16

## 2018-08-30 RX ADMIN — Medication 25 MCG: at 14:23

## 2018-08-30 RX ADMIN — Medication 25 MCG: at 05:54

## 2018-08-30 RX ADMIN — Medication 25 MCG: at 21:46

## 2018-08-30 RX ADMIN — Medication 25 MCG: at 09:58

## 2018-08-30 RX ADMIN — Medication 25 MCG: at 00:01

## 2018-08-30 RX ADMIN — SODIUM CHLORIDE, SODIUM LACTATE, POTASSIUM CHLORIDE, AND CALCIUM CHLORIDE: 600; 310; 30; 20 INJECTION, SOLUTION INTRAVENOUS at 13:07

## 2018-08-30 RX ADMIN — Medication 25 MCG: at 15:53

## 2018-08-30 RX ADMIN — Medication 25 MCG: at 08:20

## 2018-08-30 RX ADMIN — Medication 25 MCG: at 11:52

## 2018-08-30 RX ADMIN — SODIUM CHLORIDE, SODIUM LACTATE, POTASSIUM CHLORIDE, AND CALCIUM CHLORIDE: 600; 310; 30; 20 INJECTION, SOLUTION INTRAVENOUS at 21:08

## 2018-08-30 NOTE — PROGRESS NOTES
Pt monitor switched from [id number] to the externals. FHT werent picking up well. Pt now has a category 1 tracing. FHT occasionally go off due to pt moving. Pt up in chair and standing at bedside at times and rocking. Pt breathing through contractions. Pt reports contraction pain as 10 out of 10. Pt declines any pain medication for the pain. Pt encouraged to change positions and offered ice packs for back. Pt declined ice packs but is changing positions. Contractions 1.5-2.5 min apart. Pt catheter still securely in place at this time. Mavis Newman RN on 8/30/2018 at 3:35 AM

## 2018-08-30 NOTE — PROGRESS NOTES
Pt 0200 dose of cytotec held due to contractions 1-3 minutes apart and palpating moderate. Pt breathing through contractions and reports nausea. Pt denies anything for the nausea or the pain at this time.  Will continue to monitor. Mavis Newman RN on 8/30/2018 at 2:18 AM     no

## 2018-08-30 NOTE — PROGRESS NOTES
Fetal heart tones assessed and Category 1 tracing noted. Uterine activity assessed and normal uterine activity noted. Fetal heart tones moderate variability. Plan is to continue to monitor and 0000 dose of cytotec given 25 mcg po. See flowsheet for assessment Patient informed of and agrees with plan of care.

## 2018-08-30 NOTE — PLAN OF CARE
Problem: Labor (Cervical Ripen, Induct, Augment) (Adult,Obstetrics,Pediatric)  Goal: Signs and Symptoms of Listed Potential Problems Will be Absent, Minimized or Managed (Labor)  Signs and symptoms of listed potential problems will be absent, minimized or managed by discharge/transition of care (reference Labor (Cervical Ripen, Induct, Augment) (Adult,Obstetrics,Pediatric) CPG).   Outcome: Improving  Labor Shift Note  Data: Category 1 FHT, contractions every 1-3 minutes palpating mild in strength. Patient denies pain.   Signs and symptoms of infection: absent.    Complications in labor induction: absent. Support person, Juan- present.  Interventions: Continue uterine/fetal assessment per orders and nursing discretion. Vital Signs per order set. Comfort measures/pain control/labor management: Ambulation, hydration, position changes, birthing ball/sling and tub options to facilitate labor.  Plan: Provide labor/coping assistance as needed by patient and support person.  Observe for and notify care provider of indications of progressing labor, need for pain medications, or signs of fetal/maternal compromise.  Orders received for PRN Morphine and Vistaril at bedtime- see MAR. Confirmation of Oral Cytotec Q2 hours provided via phone by Dr. Rosa Newman.    Yesica Young RN

## 2018-08-30 NOTE — PROGRESS NOTES
Intrapartum Progress Note    S: Patient has been uncomfortable all night with contractions. Was nauseated and shaking. A little better this morning.      O: /83  Temp 97.2  F (36.2  C)  Resp 16   Gen: resting in bed, appears comfortable  Cvx: cook catheter in place, approximately 1cm    FHT: 140, mod variability, + accels, no decels    [place]: 4-5 contractions in 10 min    Membranes: intact    A/P: 38 year old  at 40w1d by LMP c/w 6w6d US admitted for IOL for AMA  FWB: Cat I, reactive. EFW 7-7.5 lbs  Labor: cook catheter + PO miso for ripening  Pain: per patient request  GBS: negative  Rh: positive  Rubella: immune  Continue routine labor management.   Anticipate     Sona Newman MD 7:15 AM

## 2018-08-30 NOTE — PROGRESS NOTES
Intrapartum Progress Note    S: Patient is more comfortable, just bored and frustrated.     O: /75  Temp 98.3  F (36.8  C) (Oral)  Resp 16   Gen: resting in bed, NAD  Cvx: 1-2/50/-3    FHT: 135, mod variability, + accels, no decels  [place]: irregular pattern, 2-4 contractions in 10 min    Membranes: intact    A/P: 38 year old  at 40w1d by LMP c/w 6w6d US admitted for IOL for AMA  FWB: Cat I, reactive. EFW 7-7.5 lbs  Labor: cook catheter removed. Will switch to PV miso overnight.  Pain: per patient request  GBS: negative  Rh: positive  Rubella: immune  Continue routine labor management.   Anticipate     Sona Newman MD 5:08 PM

## 2018-08-30 NOTE — PROGRESS NOTES
Pt now back in bed resting on left side. FHT category 1 tracing. Contractions not picking up while in pt side at this time. Mavis Newman RN on 8/30/2018 at 3:51 AM

## 2018-08-30 NOTE — PROGRESS NOTES
Intrapartum Progress Note    S: Patient is more comfortable with contractions.     O: /75  Temp 98.3  F (36.8  C) (Oral)  Resp 16   Gen: resting in bed, NAD  Cvx: approximately 1cm, cook still in place    FHT: 140, mod variability, + accels, no decels  [place]: 4 contractions in 10 min    Membranes: intact    A/P: 38 year old  at 40w1d by LMP c/w 6w6d US admitted for IOL for AMA  FWB: Cat I, reactive. EFW 7-7.5 lbs  Labor: cook catheter + PO miso for ripening  Pain: per patient request  GBS: negative  Rh: positive  Rubella: immune  Continue routine labor management.   Anticipate     Sona Newman MD 11:51 AM

## 2018-08-31 ENCOUNTER — ANESTHESIA (OUTPATIENT)
Dept: OBGYN | Facility: OTHER | Age: 38
End: 2018-08-31
Payer: COMMERCIAL

## 2018-08-31 ENCOUNTER — ANESTHESIA EVENT (OUTPATIENT)
Dept: OBGYN | Facility: OTHER | Age: 38
End: 2018-08-31
Payer: COMMERCIAL

## 2018-08-31 LAB — T PALLIDUM AB SER QL: NONREACTIVE

## 2018-08-31 PROCEDURE — 25000125 ZZHC RX 250: Performed by: NURSE ANESTHETIST, CERTIFIED REGISTERED

## 2018-08-31 PROCEDURE — 25000128 H RX IP 250 OP 636: Performed by: OBSTETRICS & GYNECOLOGY

## 2018-08-31 PROCEDURE — 25000128 H RX IP 250 OP 636: Performed by: NURSE ANESTHETIST, CERTIFIED REGISTERED

## 2018-08-31 PROCEDURE — 10H07YZ INSERTION OF OTHER DEVICE INTO PRODUCTS OF CONCEPTION, VIA NATURAL OR ARTIFICIAL OPENING: ICD-10-PCS | Performed by: OBSTETRICS & GYNECOLOGY

## 2018-08-31 PROCEDURE — 12000027 ZZH R&B OB

## 2018-08-31 PROCEDURE — 37000011 ZZH ANESTHESIA WARD SERVICE

## 2018-08-31 PROCEDURE — 10907ZC DRAINAGE OF AMNIOTIC FLUID, THERAPEUTIC FROM PRODUCTS OF CONCEPTION, VIA NATURAL OR ARTIFICIAL OPENING: ICD-10-PCS | Performed by: OBSTETRICS & GYNECOLOGY

## 2018-08-31 PROCEDURE — 25000132 ZZH RX MED GY IP 250 OP 250 PS 637: Performed by: OBSTETRICS & GYNECOLOGY

## 2018-08-31 RX ORDER — LIDOCAINE 40 MG/G
CREAM TOPICAL
Status: DISCONTINUED | OUTPATIENT
Start: 2018-08-31 | End: 2018-09-01

## 2018-08-31 RX ORDER — ROPIVACAINE HYDROCHLORIDE 2 MG/ML
10 INJECTION, SOLUTION EPIDURAL; INFILTRATION; PERINEURAL ONCE
Status: COMPLETED | OUTPATIENT
Start: 2018-08-31 | End: 2018-08-31

## 2018-08-31 RX ORDER — CEFAZOLIN SODIUM 2 G/100ML
2 INJECTION, SOLUTION INTRAVENOUS
Status: DISCONTINUED | OUTPATIENT
Start: 2018-08-31 | End: 2018-09-01 | Stop reason: HOSPADM

## 2018-08-31 RX ORDER — CEFAZOLIN SODIUM 1 G/50ML
1 INJECTION, SOLUTION INTRAVENOUS SEE ADMIN INSTRUCTIONS
Status: DISCONTINUED | OUTPATIENT
Start: 2018-08-31 | End: 2018-09-01 | Stop reason: HOSPADM

## 2018-08-31 RX ORDER — NALOXONE HYDROCHLORIDE 0.4 MG/ML
.1-.4 INJECTION, SOLUTION INTRAMUSCULAR; INTRAVENOUS; SUBCUTANEOUS
Status: DISCONTINUED | OUTPATIENT
Start: 2018-08-31 | End: 2018-09-01

## 2018-08-31 RX ORDER — TERBUTALINE SULFATE 1 MG/ML
0.25 INJECTION, SOLUTION SUBCUTANEOUS
Status: DISCONTINUED | OUTPATIENT
Start: 2018-08-31 | End: 2018-09-01

## 2018-08-31 RX ORDER — CITRIC ACID/SODIUM CITRATE 334-500MG
30 SOLUTION, ORAL ORAL
Status: DISCONTINUED | OUTPATIENT
Start: 2018-08-31 | End: 2018-09-01 | Stop reason: HOSPADM

## 2018-08-31 RX ORDER — LIDOCAINE HYDROCHLORIDE AND EPINEPHRINE 15; 5 MG/ML; UG/ML
3 INJECTION, SOLUTION EPIDURAL
Status: COMPLETED | OUTPATIENT
Start: 2018-08-31 | End: 2018-08-31

## 2018-08-31 RX ORDER — NALBUPHINE HYDROCHLORIDE 10 MG/ML
2.5-5 INJECTION, SOLUTION INTRAMUSCULAR; INTRAVENOUS; SUBCUTANEOUS EVERY 6 HOURS PRN
Status: DISCONTINUED | OUTPATIENT
Start: 2018-08-31 | End: 2018-09-01

## 2018-08-31 RX ORDER — FENTANYL CITRATE 50 UG/ML
100 INJECTION, SOLUTION INTRAMUSCULAR; INTRAVENOUS ONCE
Status: COMPLETED | OUTPATIENT
Start: 2018-08-31 | End: 2018-08-31

## 2018-08-31 RX ORDER — SODIUM CHLORIDE, SODIUM LACTATE, POTASSIUM CHLORIDE, CALCIUM CHLORIDE 600; 310; 30; 20 MG/100ML; MG/100ML; MG/100ML; MG/100ML
INJECTION, SOLUTION INTRAVENOUS CONTINUOUS
Status: DISCONTINUED | OUTPATIENT
Start: 2018-09-01 | End: 2018-09-01 | Stop reason: HOSPADM

## 2018-08-31 RX ORDER — PHENYLEPHRINE HCL IN 0.9% NACL 1 MG/10 ML
100 SYRINGE (ML) INTRAVENOUS EVERY 5 MIN PRN
Status: DISCONTINUED | OUTPATIENT
Start: 2018-08-31 | End: 2018-09-01

## 2018-08-31 RX ORDER — LIDOCAINE HYDROCHLORIDE 10 MG/ML
INJECTION, SOLUTION INFILTRATION; PERINEURAL PRN
Status: DISCONTINUED | OUTPATIENT
Start: 2018-08-31 | End: 2018-09-01

## 2018-08-31 RX ADMIN — Medication 25 MCG: at 02:04

## 2018-08-31 RX ADMIN — SODIUM CHLORIDE, SODIUM LACTATE, POTASSIUM CHLORIDE, AND CALCIUM CHLORIDE 1000 ML: 600; 310; 30; 20 INJECTION, SOLUTION INTRAVENOUS at 17:03

## 2018-08-31 RX ADMIN — Medication 10 ML/HR: at 22:04

## 2018-08-31 RX ADMIN — LIDOCAINE HYDROCHLORIDE AND EPINEPHRINE 75 MG: 15; 5 INJECTION, SOLUTION EPIDURAL at 16:59

## 2018-08-31 RX ADMIN — SODIUM CHLORIDE, SODIUM LACTATE, POTASSIUM CHLORIDE, AND CALCIUM CHLORIDE: 600; 310; 30; 20 INJECTION, SOLUTION INTRAVENOUS at 16:41

## 2018-08-31 RX ADMIN — OXYTOCIN 2 MILLI-UNITS/MIN: 10 INJECTION INTRAVENOUS at 08:50

## 2018-08-31 RX ADMIN — SODIUM CHLORIDE, SODIUM LACTATE, POTASSIUM CHLORIDE, AND CALCIUM CHLORIDE: 600; 310; 30; 20 INJECTION, SOLUTION INTRAVENOUS at 04:38

## 2018-08-31 RX ADMIN — ONDANSETRON 4 MG: 2 INJECTION INTRAMUSCULAR; INTRAVENOUS at 15:17

## 2018-08-31 RX ADMIN — ROPIVACAINE HYDROCHLORIDE 10 ML: 2 INJECTION, SOLUTION EPIDURAL; INFILTRATION; PERINEURAL at 17:09

## 2018-08-31 RX ADMIN — FENTANYL CITRATE 100 MCG: 50 INJECTION INTRAMUSCULAR; INTRAVENOUS at 16:00

## 2018-08-31 RX ADMIN — SODIUM CHLORIDE, SODIUM LACTATE, POTASSIUM CHLORIDE, AND CALCIUM CHLORIDE: 600; 310; 30; 20 INJECTION, SOLUTION INTRAVENOUS at 13:57

## 2018-08-31 RX ADMIN — Medication 12 ML/HR: at 17:09

## 2018-08-31 RX ADMIN — SODIUM CHLORIDE, SODIUM LACTATE, POTASSIUM CHLORIDE, AND CALCIUM CHLORIDE: 600; 310; 30; 20 INJECTION, SOLUTION INTRAVENOUS at 19:47

## 2018-08-31 RX ADMIN — LIDOCAINE HYDROCHLORIDE 30 MG: 10 INJECTION, SOLUTION INFILTRATION; PERINEURAL at 16:54

## 2018-08-31 RX ADMIN — Medication 100 MCG: at 19:32

## 2018-08-31 NOTE — ANESTHESIA PREPROCEDURE EVALUATION
Anesthesia Evaluation       history and physical reviewed . Pt has not had prior anesthetic            ROS/MED HX    ENT/Pulmonary:  - neg pulmonary ROS     Neurologic:  - neg neurologic ROS     Cardiovascular:  - neg cardiovascular ROS       METS/Exercise Tolerance:  >4 METS   Hematologic:  - neg hematologic  ROS       Musculoskeletal:  - neg musculoskeletal ROS       GI/Hepatic:  - neg GI/hepatic ROS       Renal/Genitourinary:         Endo:  - neg endo ROS       Psychiatric:  - neg psychiatric ROS       Infectious Disease:  - neg infectious disease ROS       Malignancy:      - no malignancy   Other:    (+) Possibly pregnant   - neg other ROS                 Physical Exam  Normal systems: cardiovascular, pulmonary and dental    Airway   Mallampati: II  TM distance: >3 FB  Neck ROM: full    Dental     Cardiovascular   Rhythm and rate: regular and normal      Pulmonary    breath sounds clear to auscultation          neg OB ROS                 Anesthesia Plan      History & Physical Review      ASA Status:  .  OB Epidural Asa: 2       Plan for     Labor epidural pre evaluation performed at [facility].  Charted after completion of labor epidural      Postoperative Care      Consents  Anesthetic plan, risks, benefits and alternatives discussed with:  Patient..                          .

## 2018-08-31 NOTE — PROGRESS NOTES
Intrapartum Progress Note    S: Patient is comfortable, was able to get some sleep overnight.     O: /59  Temp 98  F (36.7  C) (Oral)  Resp 16   Gen: resting in bed, NAD  Cvx: 1-2/50/-3    FHT: 135, mod variability, + accels, no decels  [place]: 2 contractions in 10 min    Membranes: intact    A/P: 38 year old  at 40w2d by LMP c/w 6w6d US admitted for IOL for AMA  FWB: Cat I, reactive. EFW 7-7.5 lbs  Labor: s/p 24 hours of cook catheter + PO misoprostol, s/p 12 hrs PV misoprostol. Will switch to pitocin and AROM when feasible  Pain: per patient request  GBS: negative  Rh: positive  Rubella: immune  Continue routine labor management.   Anticipate     Sona Newman MD 8:43 AM

## 2018-08-31 NOTE — PROGRESS NOTES
Patient has been sleeping, states ctx are not painful.  IV patent , external monitors are in place irregular ctx noted . Category I tracing.   Vaginal cytotec placed per Dr. Peters.

## 2018-08-31 NOTE — PLAN OF CARE
Problem: Labor (Cervical Ripen, Induct, Augment) (Adult,Obstetrics,Pediatric)  Intervention: Position/Reposition to Promote Fetal Well-Being/Optimize Contraction Pattern  Pt nauseated. SVE 2.5. Oxygen given and encouraged on breathing. Repos to side lying.

## 2018-08-31 NOTE — PROGRESS NOTES
Intrapartum Progress Note    S: Patient is getting more uncomfortable with contractions.     O: /87  Temp 98.1  F (36.7  C)  Resp 16   Gen: resting in bed, NAD  Cvx: 2/70/-2    FHT: 140, mod variability, + accels, no decels  [place]: 4 contractions in 10 min    Membranes: AROM- clear fluid    A/P: 38 year old  at 40w2d by LMP c/w 6w6d US admitted for IOL for AMA  FWB: Cat I, reactive. EFW 7-7.5 lbs  Labor: s/p 24 hours of cook catheter + PO misoprostol, s/p 12 hrs PV misoprostol. On pitocin per protocol, s/p AROM  Pain: per patient request  GBS: negative  Rh: positive  Rubella: immune  Continue routine labor management.   Anticipate     Sona Newman MD 12:46 PM

## 2018-08-31 NOTE — PLAN OF CARE
Problem: Labor (Cervical Ripen, Induct, Augment) (Adult,Obstetrics,Pediatric)  Intervention: Monitor/Manage Labor Pain  Requesting more pain meds. Refuses a vaginal check. Dr K Johnson called. Orders obtained. Anesthesia notified.

## 2018-08-31 NOTE — PLAN OF CARE
Problem: Labor (Cervical Ripen, Induct, Augment) (Adult,Obstetrics,Pediatric)  Intervention: Prevent/Manage Maternal Infection  To tub. Peace and calming added to tub per request. Feeling nausea. Medicated.

## 2018-08-31 NOTE — PLAN OF CARE
Problem: Labor (Cervical Ripen, Induct, Augment) (Adult,Obstetrics,Pediatric)  Intervention: Monitor/Manage Labor Pain  Dr INA Newman here. Reviews strip. SVE 2/ 70. AROM large, amounts of clear fluid noted.

## 2018-08-31 NOTE — PROGRESS NOTES
Intrapartum Progress Note    S: Patient is comfortable with epidural.     O: /71  Pulse 92  Temp 97.9  F (36.6  C) (Tympanic)  Resp 16  SpO2 99%   Gen: resting in bed, NAD  Cvx: 3/90/-1    FHT: 135, mod variability, + accels, no decels  [place]: 3-4 contractions in 10 min    Membranes: s/p AROM, clear fluid    A/P: 38 year old  at 40w2d by LMP c/w 6w6d US admitted for IOL for AMA  FWB: Cat I, reactive. EFW 7-7.5 lbs  Labor: s/p 24 hours of cook catheter + PO misoprostol, s/p 12 hrs PV misoprostol. On pitocin per protocol, s/p AROM  Pain: per patient request  GBS: negative  Rh: positive  Rubella: immune  Continue routine labor management.   Anticipate     Sona Newman MD 6:15 PM

## 2018-08-31 NOTE — ANESTHESIA PROCEDURE NOTES
Peripheral nerve/Neuraxial procedure note : epidural catheter  Pre-Procedure  Performed by  SARBJIT JI   Location: OB    Procedure Times:8/31/2018 4:47 PM and 8/31/2018 5:11 PM  Pre-Anesthestic Checklist: patient identified, IV checked, site marked, risks and benefits discussed, informed consent, monitors and equipment checked, pre-op evaluation, at physician/surgeon's request and post-op pain management    Timeout  Correct Patient: Yes   Correct Procedure: Yes   Correct Site: Yes   Correct Laterality: Yes   Correct Position: Yes   Site Marked: Yes   .   Procedure Documentation    .    Procedure:    Epidural catheter.  Insertion Site:L3-4  (midline approach) Injection technique: LORT air   Local skin infiltrated with mL of 1% lidocaine.  MARTHA at 6 cm     .  .  Needle: ToStockTwitsy needle Needle Gauge: 17.    Needle Length (Inches) 3.5  # of attempts: 1 and # of redirects:  .   Catheter: 20 G . .  Catheter threaded easily  6 cm epidural space.  12 cm at skin.   .    Assessment/Narrative  Paresthesias: No.  .  .  Aspiration negative for heme or CSF  . Test dose of 5 mL lidocaine 1.5% w/ 1:200,000 epinephrine at 16:59.  Test dose negative for signs of intravascular, subdural or intrathecal injection. Comments:  Epidural  To c section.  Epidural infusion stopped 2345.  Epidural catheter discontinued at 0037.  Tip intact

## 2018-08-31 NOTE — PLAN OF CARE
Problem: Labor (Cervical Ripen, Induct, Augment) (Adult,Obstetrics,Pediatric)  Intervention: Position/Reposition to Promote Fetal Well-Being/Optimize Contraction Pattern  Pt on a external monitor. Up in room and using the labor ball. Re-oriented to breathing.

## 2018-08-31 NOTE — PLAN OF CARE
Problem: Labor (Cervical Ripen, Induct, Augment) (Adult,Obstetrics,Pediatric)  Intervention: Monitor/Manage Labor Pain  Pain gone. Dr INA Newman here. SVE 3/90. 16 fr cath placed with large amounts of clear yellow urine returns. Repos to right side. Encouraged to rest.

## 2018-08-31 NOTE — PROGRESS NOTES
Patient awake, denies increased ctx or increased pain.   No vaginal bleeding or leaking fluid. Has slept most of night.  SVE no cervical change ,   Dr. RADHAMES Newman  Notified.  Refer to flow sheets for assessments and VS.

## 2018-08-31 NOTE — PLAN OF CARE
Problem: Labor (Cervical Ripen, Induct, Augment) (Adult,Obstetrics,Pediatric)  Intervention: Monitor/Manage Labor Pain  1730 Feeling pain on left side. Repos to LS and bolus given. 1740 Pain gone. Bilat legs tingling.

## 2018-09-01 ENCOUNTER — SURGERY (OUTPATIENT)
Age: 38
End: 2018-09-01

## 2018-09-01 ENCOUNTER — ANESTHESIA (OUTPATIENT)
Dept: SURGERY | Facility: OTHER | Age: 38
End: 2018-09-01
Payer: COMMERCIAL

## 2018-09-01 ENCOUNTER — ANESTHESIA EVENT (OUTPATIENT)
Dept: SURGERY | Facility: OTHER | Age: 38
End: 2018-09-01
Payer: COMMERCIAL

## 2018-09-01 PROBLEM — Z98.891 S/P CESAREAN SECTION: Status: ACTIVE | Noted: 2018-09-01

## 2018-09-01 PROCEDURE — 37000008 ZZH ANESTHESIA TECHNICAL FEE, 1ST 30 MIN: Performed by: OBSTETRICS & GYNECOLOGY

## 2018-09-01 PROCEDURE — 25000128 H RX IP 250 OP 636: Performed by: OBSTETRICS & GYNECOLOGY

## 2018-09-01 PROCEDURE — 71000015 ZZH RECOVERY PHASE 1 LEVEL 2 EA ADDTL HR: Performed by: OBSTETRICS & GYNECOLOGY

## 2018-09-01 PROCEDURE — 12000027 ZZH R&B OB

## 2018-09-01 PROCEDURE — 71000014 ZZH RECOVERY PHASE 1 LEVEL 2 FIRST HR: Performed by: OBSTETRICS & GYNECOLOGY

## 2018-09-01 PROCEDURE — 36000058 ZZH SURGERY LEVEL 3 EA 15 ADDTL MIN: Performed by: OBSTETRICS & GYNECOLOGY

## 2018-09-01 PROCEDURE — 27210794 ZZH OR GENERAL SUPPLY STERILE: Performed by: OBSTETRICS & GYNECOLOGY

## 2018-09-01 PROCEDURE — 59510 CESAREAN DELIVERY: CPT | Performed by: NURSE ANESTHETIST, CERTIFIED REGISTERED

## 2018-09-01 PROCEDURE — 59510 CESAREAN DELIVERY: CPT | Performed by: OBSTETRICS & GYNECOLOGY

## 2018-09-01 PROCEDURE — 40000275 ZZH STATISTIC RCP TIME EA 10 MIN

## 2018-09-01 PROCEDURE — 25000132 ZZH RX MED GY IP 250 OP 250 PS 637: Performed by: OBSTETRICS & GYNECOLOGY

## 2018-09-01 PROCEDURE — 37000009 ZZH ANESTHESIA TECHNICAL FEE, EACH ADDTL 15 MIN: Performed by: OBSTETRICS & GYNECOLOGY

## 2018-09-01 PROCEDURE — 36000056 ZZH SURGERY LEVEL 3 1ST 30 MIN: Performed by: OBSTETRICS & GYNECOLOGY

## 2018-09-01 PROCEDURE — 25000125 ZZHC RX 250: Performed by: NURSE ANESTHETIST, CERTIFIED REGISTERED

## 2018-09-01 RX ORDER — BISACODYL 10 MG
10 SUPPOSITORY, RECTAL RECTAL DAILY PRN
Status: DISCONTINUED | OUTPATIENT
Start: 2018-09-03 | End: 2018-09-03 | Stop reason: HOSPADM

## 2018-09-01 RX ORDER — NALOXONE HYDROCHLORIDE 0.4 MG/ML
.1-.4 INJECTION, SOLUTION INTRAMUSCULAR; INTRAVENOUS; SUBCUTANEOUS
Status: DISCONTINUED | OUTPATIENT
Start: 2018-09-01 | End: 2018-09-01

## 2018-09-01 RX ORDER — NALOXONE HYDROCHLORIDE 0.4 MG/ML
.1-.4 INJECTION, SOLUTION INTRAMUSCULAR; INTRAVENOUS; SUBCUTANEOUS
Status: DISCONTINUED | OUTPATIENT
Start: 2018-09-01 | End: 2018-09-03 | Stop reason: HOSPADM

## 2018-09-01 RX ORDER — OXYTOCIN 10 [USP'U]/ML
10 INJECTION, SOLUTION INTRAMUSCULAR; INTRAVENOUS
Status: DISCONTINUED | OUTPATIENT
Start: 2018-09-01 | End: 2018-09-03 | Stop reason: HOSPADM

## 2018-09-01 RX ORDER — LIDOCAINE 40 MG/G
CREAM TOPICAL
Status: DISCONTINUED | OUTPATIENT
Start: 2018-09-01 | End: 2018-09-03 | Stop reason: HOSPADM

## 2018-09-01 RX ORDER — ONDANSETRON 2 MG/ML
4 INJECTION INTRAMUSCULAR; INTRAVENOUS EVERY 30 MIN PRN
Status: DISCONTINUED | OUTPATIENT
Start: 2018-09-01 | End: 2018-09-01 | Stop reason: HOSPADM

## 2018-09-01 RX ORDER — AMOXICILLIN 250 MG
1 CAPSULE ORAL 2 TIMES DAILY PRN
Status: DISCONTINUED | OUTPATIENT
Start: 2018-09-01 | End: 2018-09-03 | Stop reason: HOSPADM

## 2018-09-01 RX ORDER — ONDANSETRON 4 MG/1
4 TABLET, ORALLY DISINTEGRATING ORAL EVERY 30 MIN PRN
Status: DISCONTINUED | OUTPATIENT
Start: 2018-09-01 | End: 2018-09-01 | Stop reason: HOSPADM

## 2018-09-01 RX ORDER — IBUPROFEN 600 MG/1
600 TABLET, FILM COATED ORAL EVERY 6 HOURS PRN
Status: DISCONTINUED | OUTPATIENT
Start: 2018-09-01 | End: 2018-09-03 | Stop reason: HOSPADM

## 2018-09-01 RX ORDER — BUPIVACAINE HYDROCHLORIDE 7.5 MG/ML
INJECTION, SOLUTION INTRASPINAL PRN
Status: DISCONTINUED | OUTPATIENT
Start: 2018-09-01 | End: 2018-09-01

## 2018-09-01 RX ORDER — LANOLIN 100 %
OINTMENT (GRAM) TOPICAL
Status: DISCONTINUED | OUTPATIENT
Start: 2018-09-01 | End: 2018-09-03 | Stop reason: HOSPADM

## 2018-09-01 RX ORDER — DEXTROSE, SODIUM CHLORIDE, SODIUM LACTATE, POTASSIUM CHLORIDE, AND CALCIUM CHLORIDE 5; .6; .31; .03; .02 G/100ML; G/100ML; G/100ML; G/100ML; G/100ML
INJECTION, SOLUTION INTRAVENOUS CONTINUOUS
Status: DISCONTINUED | OUTPATIENT
Start: 2018-09-01 | End: 2018-09-03 | Stop reason: HOSPADM

## 2018-09-01 RX ORDER — HYDROCORTISONE 2.5 %
CREAM (GRAM) TOPICAL 3 TIMES DAILY PRN
Status: DISCONTINUED | OUTPATIENT
Start: 2018-09-01 | End: 2018-09-03 | Stop reason: HOSPADM

## 2018-09-01 RX ORDER — KETOROLAC TROMETHAMINE 30 MG/ML
30 INJECTION, SOLUTION INTRAMUSCULAR; INTRAVENOUS EVERY 6 HOURS
Status: ACTIVE | OUTPATIENT
Start: 2018-09-01 | End: 2018-09-02

## 2018-09-01 RX ORDER — OXYCODONE AND ACETAMINOPHEN 5; 325 MG/1; MG/1
1-2 TABLET ORAL EVERY 4 HOURS PRN
Status: DISCONTINUED | OUTPATIENT
Start: 2018-09-01 | End: 2018-09-03 | Stop reason: HOSPADM

## 2018-09-01 RX ORDER — ONDANSETRON 2 MG/ML
4 INJECTION INTRAMUSCULAR; INTRAVENOUS EVERY 6 HOURS PRN
Status: DISCONTINUED | OUTPATIENT
Start: 2018-09-01 | End: 2018-09-03 | Stop reason: HOSPADM

## 2018-09-01 RX ORDER — SODIUM CHLORIDE, SODIUM LACTATE, POTASSIUM CHLORIDE, CALCIUM CHLORIDE 600; 310; 30; 20 MG/100ML; MG/100ML; MG/100ML; MG/100ML
INJECTION, SOLUTION INTRAVENOUS CONTINUOUS
Status: DISCONTINUED | OUTPATIENT
Start: 2018-09-01 | End: 2018-09-01 | Stop reason: HOSPADM

## 2018-09-01 RX ORDER — LIDOCAINE HYDROCHLORIDE 10 MG/ML
INJECTION, SOLUTION INFILTRATION; PERINEURAL PRN
Status: DISCONTINUED | OUTPATIENT
Start: 2018-09-01 | End: 2018-09-01

## 2018-09-01 RX ORDER — MISOPROSTOL 100 UG/1
400 TABLET ORAL
Status: DISCONTINUED | OUTPATIENT
Start: 2018-09-01 | End: 2018-09-03 | Stop reason: HOSPADM

## 2018-09-01 RX ORDER — AMOXICILLIN 250 MG
2 CAPSULE ORAL 2 TIMES DAILY PRN
Status: DISCONTINUED | OUTPATIENT
Start: 2018-09-01 | End: 2018-09-03 | Stop reason: HOSPADM

## 2018-09-01 RX ORDER — MORPHINE SULFATE 1 MG/ML
INJECTION, SOLUTION EPIDURAL; INTRATHECAL; INTRAVENOUS PRN
Status: DISCONTINUED | OUTPATIENT
Start: 2018-09-01 | End: 2018-09-01

## 2018-09-01 RX ORDER — SIMETHICONE 80 MG
80 TABLET,CHEWABLE ORAL 4 TIMES DAILY PRN
Status: DISCONTINUED | OUTPATIENT
Start: 2018-09-01 | End: 2018-09-03 | Stop reason: HOSPADM

## 2018-09-01 RX ADMIN — MORPHINE SULFATE 100 MCG: 1 INJECTION, SOLUTION EPIDURAL; INTRATHECAL; INTRAVENOUS at 00:44

## 2018-09-01 RX ADMIN — BUPIVACAINE HYDROCHLORIDE IN DEXTROSE 1.6 ML: 7.5 INJECTION, SOLUTION SUBARACHNOID at 00:44

## 2018-09-01 RX ADMIN — KETOROLAC TROMETHAMINE 30 MG: 30 INJECTION, SOLUTION INTRAMUSCULAR at 15:49

## 2018-09-01 RX ADMIN — IBUPROFEN 600 MG: 600 TABLET ORAL at 23:01

## 2018-09-01 RX ADMIN — LIDOCAINE HYDROCHLORIDE 30 MG: 10 INJECTION, SOLUTION INFILTRATION; PERINEURAL at 00:43

## 2018-09-01 RX ADMIN — Medication: at 01:07

## 2018-09-01 RX ADMIN — CEFAZOLIN SODIUM 2 G: 2 INJECTION, SOLUTION INTRAVENOUS at 00:49

## 2018-09-01 RX ADMIN — KETOROLAC TROMETHAMINE 30 MG: 30 INJECTION, SOLUTION INTRAMUSCULAR at 09:52

## 2018-09-01 RX ADMIN — ONDANSETRON 4 MG: 2 INJECTION INTRAMUSCULAR; INTRAVENOUS at 01:11

## 2018-09-01 RX ADMIN — KETOROLAC TROMETHAMINE 30 MG: 30 INJECTION, SOLUTION INTRAMUSCULAR at 04:44

## 2018-09-01 RX ADMIN — SODIUM CHLORIDE, SODIUM LACTATE, POTASSIUM CHLORIDE, AND CALCIUM CHLORIDE: 600; 310; 30; 20 INJECTION, SOLUTION INTRAVENOUS at 00:38

## 2018-09-01 NOTE — PLAN OF CARE
Problem: Labor (Cervical Ripen, Induct, Augment) (Adult,Obstetrics,Pediatric)  Intervention: Monitor/Manage Labor Pain  Pt feeling pain in right lower back and perineum. Rated at a 8. Repos to right side. HOB elevated. Bolus initiated, Pitocin stopped. Dr NIA Newman notified. Anesthesia notified. Restart pitocin when baby reactive at 10 mu/hr. O2 on at 12 liter per  mask..

## 2018-09-01 NOTE — ANESTHESIA PREPROCEDURE EVALUATION
Anesthesia Evaluation     . Pt has not had prior anesthetic            ROS/MED HX    ENT/Pulmonary:  - neg pulmonary ROS     Neurologic:  - neg neurologic ROS     Cardiovascular:  - neg cardiovascular ROS       METS/Exercise Tolerance:  >4 METS   Hematologic:  - neg hematologic  ROS       Musculoskeletal:  - neg musculoskeletal ROS       GI/Hepatic:  - neg GI/hepatic ROS       Renal/Genitourinary:         Endo:  - neg endo ROS       Psychiatric:  - neg psychiatric ROS       Infectious Disease:  - neg infectious disease ROS       Malignancy:      - no malignancy   Other:    (+) Possibly pregnant   - neg other ROS                 Physical Exam  Normal systems: cardiovascular, pulmonary and dental    Airway   Mallampati: II  TM distance: >3 FB  Neck ROM: full    Dental     Cardiovascular   Rhythm and rate: regular and normal      Pulmonary    breath sounds clear to auscultation                    Anesthesia Plan      History & Physical Review      ASA Status:  2 .    NPO Status:  > 8 hours    Plan for Spinal          Postoperative Care      Consents                          .

## 2018-09-01 NOTE — ANESTHESIA POSTPROCEDURE EVALUATION
Patient: Ann Lopez    Procedure(s):   - Wound Class: II-Clean Contaminated    Diagnosis:Failure to progress  Diagnosis Additional Information: No value filed.    Anesthesia Type:  Spinal    Note:  Anesthesia Post Evaluation    Patient location during evaluation: OB PACU  Patient participation: Able to participate in evaluation but full recovery from regional anesthesia has not yet ocurrred but is anticipated to occur within 48 hours  Level of consciousness: awake and alert  Pain management: adequate  Airway patency: patent  Cardiovascular status: acceptable, blood pressure returned to baseline and hemodynamically stable  Respiratory status: acceptable  Hydration status: acceptable  PONV: none     Anesthetic complications: None          Last vitals:  Vitals:    08/31/18 2158 08/31/18 2228 08/31/18 2249   BP: 129/75 103/72 123/77   Pulse:      Resp:      Temp:      SpO2:            Electronically Signed By: GOLDEN Krueger CRNA  September 1, 2018  1:56 AM

## 2018-09-01 NOTE — ANESTHESIA CARE TRANSFER NOTE
Patient: Ann Lopez    Procedure(s):   - Wound Class: II-Clean Contaminated    Diagnosis: Failure to progress  Diagnosis Additional Information: No value filed.    Anesthesia Type:   Spinal     Note:  Airway :Room Air  Patient transferred to:Labor and Delivery  Handoff Report: Identifed the Patient, Identified the Reponsible Provider, Reviewed the pertinent medical history, Discussed the surgical course, Reviewed Intra-OP anesthesia mangement and issues during anesthesia, Set expectations for post-procedure period and Allowed opportunity for questions and acknowledgement of understanding      Vitals: (Last set prior to Anesthesia Care Transfer)    CRNA VITALS  9/1/2018 0113 - 9/1/2018 0154      9/1/2018             Resp Rate (set): 10                Electronically Signed By: GOLDEN Krueger CRNA  September 1, 2018  1:54 AM

## 2018-09-01 NOTE — PLAN OF CARE
Problem: Labor (Cervical Ripen, Induct, Augment) (Adult,Obstetrics,Pediatric)  Intervention: Monitor/Manage Maternal Hemodynamic Stability  250 ml amnioinfusion completed at 1050.

## 2018-09-01 NOTE — PLAN OF CARE
Problem: Postpartum ( Delivery) (Adult,Obstetrics,Pediatric)  Goal: Signs and Symptoms of Listed Potential Problems Will be Absent, Minimized or Managed (Postpartum)  Signs and symptoms of listed potential problems will be absent, minimized or managed by discharge/transition of care (reference Postpartum ( Delivery) (Adult,Obstetrics,Pediatric) CPG).  0730 Pt assisted out of bed with 2 assists. Abdominal binder applied. To BR and sophie care provided.

## 2018-09-01 NOTE — OP NOTE
[facility]  Obstetrics Service Operative Report    Surgery Date:  2018  Surgeon(s): Sona Newman     Preoperative Diagnoses:  1.  Intrauterine pregnancy at 40w3d  2.  Fetal intolerance of labor, remote from delivery    Postoperative diagnoses: Same s/p delivery    Procedure performed:  Primary low segment transverse  section via Pfannenstiel incision with double layer uterine closure    Anesthesia:  Spinal   Est Blood Loss (mL): 1000 mL  Specimens: cord blood  Complications: None      Operative findings: Single viable male infant at 0105 hours on 2018. Apgars of 9 and 9 at one and five minutes.  Birth weight (GM): pending.  Fetal presentation: cephalic.  Position: ELOY  Amniotic fluid: clear.  Placenta intact with 3 vessel cord.   Normal appearing uterus, fallopian tubes, ovaries.  No intraabdominal adhesions.  No abdominal wall adhesions      Indication: Ann Lopez is a 38 year old, , who was admitted at 40w1d by LMP c/w 6w6d ultrasound for IOL for advanced maternal age.  She received 36 hours of cervical ripening followed by 18 hours of pitocin and AROM. While on pitocin at 5cm, she developed recurrent late and variable decelerations that did not respond to position change, fluids, and an amnioinfusion. Given category II FHT remote from delivery and inability to continue pitocin titration,  section was recommended. The risks, benefits, and alternatives of  delivery were explained and the patient agreed to proceed.     Procedure details:  After obtaining informed consent, the patient was taken to the operating room. She received 2g ancef prior to the skin incision. She was placed in the dorsal supine position with a leftward tilt and prepped and draped in the usual sterile fashion. Following test of adequate spinal anesthesia, the abdomen was entered through a transverse skin incision. The skin incision was made sharply and carried through the subcutaneous  tissue to the fascia.  Fascia was incised in the midline and extended laterally with the Castro scissors.  The superior margin of the fascial incision was grasped with Kocher clamps and dissected from the underlying muscle sharp and blunt dissecton, which was then repeated at the lower margin of the fascial incision.  The muscle was  in the midline.  The peritoneum was entered bluntly and the opening extended by digital dissection with care to avoid the bladder. An Maximino-O retractor was placed. The lower segment of the uterus was opened sharply in a transverse fashion and extended with digital pressure. The infant's head was elevated to the level of the hysterotomy and was delivered atraumatically. The cord was doubly clamped and cut and the infant was handed off to Dr Angelo Juarez. The placenta was expressed.  The uterus was exteriorized from the abdomen and cleared of all clots and debris.  The uterus was massaged and was noted to be firm.  Oxytocin was given through the running IV. The hysterotomy was repaired with 0-vicryl suture in a running locked fashion. A 2nd layer of 0-monocryl was used to imbricate the incision and good hemostasis was achieved. The bladder flap was inspected and found to be hemostatic.  The posterior cul-de-sac was suctioned and the uterus was returned to the abdomen.  The bilateral pericolic gutters were suctioned.  The hysterotomy was again inspected and found to have no active sites of bleeding.  The abdominal wall was examined and found to have no active sites of bleeding.  The fascia was closed with a running suture of 0-vicryl.  Subcutaneous tissue was irrigated. Areas that were oozing were controlled with cautery. The subcutaneous tissue was less than 3cm in depth but reapproximated with 3-0 plain gut. The skin was closed with 4-0 vicryl. The patient tolerated the procedure well and was taken to the recovery room in stable condition. All sponge, needle and instrument counts  were correct x2.     Dr Angelo Juarez was requested to be present for this unscheduled  section due to a category II fetal heart tracing and potential need for  resuscitation.    Sona Newman  Ob/Gyn   2018 1:45 AM

## 2018-09-01 NOTE — PROGRESS NOTES
Dr. INA Newman at bedside plan of care discussed. Consent for Primary C/Section signed, prepped for surgery. See surgery check list. Will continue to closely monitor.

## 2018-09-01 NOTE — PLAN OF CARE
Problem: Labor (Cervical Ripen, Induct, Augment) (Adult,Obstetrics,Pediatric)  Intervention: Assess for/Assist with Variations in Fetal Presentation  1902 IUPC placed per Dr INA Newman. Pain controlled.

## 2018-09-01 NOTE — PROGRESS NOTES
Intrapartum Progress Note    S: Patient is comfortable with epidural.     O: /69  Pulse 92  Temp 97.9  F (36.6  C) (Tympanic)  Resp 16  SpO2 97%   Gen: resting in bed  Cvx: 4/90/-2    FHT: 135, mod variability, + accels, no decels  [place]: IUPC in place, 100 MVUs    Membranes: s/p AROM    A/P: 38 year old  at 40w2d by LMP c/w 6w6d US admitted for IOL for AMA  FWB: Cat I, reactive now. Had recurrent late decels around 1900 and pitocin was stopped, restarted after fetal recovery. Discussed recommendation for  section if this recurs and not approaching delivery.  EFW 7-7.5 lbs  Labor: s/p 24 hours of cook catheter + PO misoprostol, s/p 12 hrs PV misoprostol. On pitocin per protocol, s/p AROM. Making slow progress.  Pain: per patient request  GBS: negative  Rh: positive  Rubella: immune  Continue routine labor management.   Anticipate     Sona Newman MD 9:17 PM

## 2018-09-01 NOTE — PROGRESS NOTES
Incentive Spirometry education completed.  Pt goal 3050 mls.  Pt achieved 2750 mls.  Pt instructed to perform 10/hr while awake with at least one deep breath and cough per hour until able to perform baseline activity.  RT will follow and re-assess as need.      Ayla Rodriguez, RT on 9/1/2018 at 3:22 PM

## 2018-09-01 NOTE — PROGRESS NOTES
OB/GYN Postpartum Note      S:  Patient is feeling okay this morning, just tired. Lochia light. No nausea. Ambulated once to the bathroom, no dizziness.     O:   Vitals:    18 0500 18 0530 18 0600 18 0800   BP: 104/65 104/64 98/63 113/76   BP Location:       Pulse: 66 61 55 68   Resp: 16  18   Temp:       TempSrc:       SpO2: 97% 98% 98% 97%     General: resting in bed, in NAD  Resp: nonlabored  Abdomen: soft, nontender, mildly distended  Fundus firm at umbilicus  Incision: covered in bandage without shadowing  Extremities: 1+ edema in BLE    Hemoglobin   Date Value Ref Range Status   2018 11.7 11.7 - 15.7 g/dL Final   ]    A: Ms. nAn Lopez is a 38 year old  POD #0 s/p PLTCS for category II FHT remote from delivery    P:  Regular diet as tolerated  Rajan out this evening  Encourage ambulation    Sona Newman MD  OB/GYN  2018 12:07 PM

## 2018-09-01 NOTE — PROGRESS NOTES
Dr. RADHAMES Newman at bedside. Plan of care discussed. Consent signed for a Primary C/Section. Will prep patient for surgery. See surgery check list. FHT's Category 1. Will continue to closely monitor.

## 2018-09-01 NOTE — PLAN OF CARE
Problem: Labor (Cervical Ripen, Induct, Augment) (Adult,Obstetrics,Pediatric)  Intervention: Monitor/Manage Maternal Hemodynamic Stability  Variables noted. Dr K Johnson called. Orders obtained. Amnioinfusion initiated. Pt repos.

## 2018-09-01 NOTE — OR NURSING
PACU Transfer Note    Ann Lopez was transferred to care of [facility] staff in patient room  Equipment used for transport:  none.  Hand off report given to Es Adame RN By Sherlyn Henriquez RN    PACU Respiratory Event Documentation     1) Episodes of Apnea greater than or equal to 10 seconds: no    2) Bradypnea - less than 8 breaths per minute: no    3) Pain score on 0 to 10 scale: 0    4) Pain-sedation mismatch (yes or no): no    5) Repeated 02 desaturation less than 90% (yes or no): no    Anesthesia notified? (yes or no): no    Any of the above events occuring repeatedly in separate 30 minute intervals may be considered recurrent PACU respiratory events.    Patient stable and meets phase 1 discharge criteria for transport from PACU.

## 2018-09-01 NOTE — ANESTHESIA PROCEDURE NOTES
Peripheral nerve/Neuraxial procedure note : intrathecal  Pre-Procedure  Performed by  SARBJIT JI   Location:   Procedure Times:9/1/2018 12:42 AM and 9/1/2018 12:44 AM  Pre-Anesthestic Checklist: patient identified, IV checked, site marked, risks and benefits discussed, informed consent, monitors and equipment checked, pre-op evaluation, at physician/surgeon's request and post-op pain management    Timeout  Correct Patient: Yes   Correct Procedure: Yes   Correct Site: Yes   Correct Laterality: Yes   Correct Position: Yes   Site Marked: Yes   .   Procedure Documentation  ASA 2  .    Procedure:    Intrathecal.  Insertion Site:L3-4  (midline approach)      Patient Prep;chlorhexidine gluconate and isopropyl alcohol.  .  Needle: Adam tip Spinal Needle (gauge): 25  Spinal/LP Needle Length (inches): 3.5 # of attempts: 1 and # of redirects:  Introducer used .       Assessment/Narrative  Paresthesias: No.  .  .  clear CSF fluid removed .

## 2018-09-01 NOTE — PROGRESS NOTES
Called to evaluate patient for recurrent variable decelerations that have not resolved despite amnioinfusion and repositioning requiring cessation of pitocin. Explained to patient and her  that each attempt to increase pitocin to adequate contractions has resulted in recurrent fetal decelerations and expressed concern given how remote she is from delivery. FHT is Category I when pitocin is stopped and she is not chavo regularly. At this time, recommend  section. Discussed risks and benefits, including risk of bleeding, infection, and injury to surrounding organs. Patient in agreement to proceed. Consent signed.    Sona Newman MD  OB/GYN  2018 11:46 PM

## 2018-09-02 LAB — HGB BLD-MCNC: 9.3 G/DL (ref 11.7–15.7)

## 2018-09-02 PROCEDURE — 40000275 ZZH STATISTIC RCP TIME EA 10 MIN

## 2018-09-02 PROCEDURE — 99024 POSTOP FOLLOW-UP VISIT: CPT | Performed by: OBSTETRICS & GYNECOLOGY

## 2018-09-02 PROCEDURE — 12000027 ZZH R&B OB

## 2018-09-02 PROCEDURE — 85018 HEMOGLOBIN: CPT | Performed by: OBSTETRICS & GYNECOLOGY

## 2018-09-02 PROCEDURE — 25000132 ZZH RX MED GY IP 250 OP 250 PS 637: Performed by: OBSTETRICS & GYNECOLOGY

## 2018-09-02 PROCEDURE — 36415 COLL VENOUS BLD VENIPUNCTURE: CPT | Performed by: OBSTETRICS & GYNECOLOGY

## 2018-09-02 RX ADMIN — IBUPROFEN 600 MG: 600 TABLET ORAL at 12:46

## 2018-09-02 RX ADMIN — IBUPROFEN 600 MG: 600 TABLET ORAL at 18:30

## 2018-09-02 NOTE — PLAN OF CARE
Problem: Patient Care Overview  Goal: Plan of Care/Patient Progress Review  Outcome: Therapy, progress toward functional goals as expected   18 0018   OTHER   Plan Of Care Reviewed With patient   Plan of Care Review   Progress progress toward functional goals as expected   Assessments as charted. B/P: 110/71, T: 98, P: 78, R: 18. Rates pain: 2/10 at incision site only with activity, IV occluded and would no longer flush, pt declined a new IV and stated she would switch to PO Ibuprofen. Incision: Covered with surgical dressing, dressing is CDI. Pt is due to void after parr cath removed. Fundus firm, midline, U/1. Lochia: light with a few small clots on sophie pad. Activity: unrestricted with out pain. Infant feeding: Breast feeding going well.     LATCH Score:   Latch: 2 - Good Latch  Audible Swallowin - Spontaneous & frequent  Type of Nipple: (Breast/Nipple) 2 - Everted  Comfort: 2 - Soft, Nontender  Hold: 2 - No Assist   Total LATCH Score: 10    Postpartum breastfeeding assessment completed and education provided, see Patient Education Activity.  Items included in the education are:     proper positioning and latch    effectiveness of feeding    manual expression    handling and storing breastmilk    maintenance of breastfeeding for the first 6 months    sign/symptoms of infant feeding issues requiring referral to qualified health care provider  Postpartum care education provided, see Patient Education activity. Patient denies any needs. Will continue to monitor.    Problem: Postpartum ( Delivery) (Adult,Obstetrics,Pediatric)  Goal: Signs and Symptoms of Listed Potential Problems Will be Absent, Minimized or Managed (Postpartum)  Signs and symptoms of listed potential problems will be absent, minimized or managed by discharge/transition of care (reference Postpartum ( Delivery) (Adult,Obstetrics,Pediatric) CPG).   Outcome: Therapy, progress toward functional goals as expected   18  0018   Postpartum ( Delivery)   Problems Assessed (Postpartum ) all   Problems Present (Postpartum ) none       Problem: Breastfeeding (Adult,Obstetrics,Pediatric)  Goal: Signs and Symptoms of Listed Potential Problems Will be Absent, Minimized or Managed (Breastfeeding)  Signs and symptoms of listed potential problems will be absent, minimized or managed by discharge/transition of care (reference Breastfeeding (Adult,Obstetrics,Pediatric) CPG).  Outcome: Therapy, progress toward functional goals as expected   18 0018   Breastfeeding   Problems Assessed (Breastfeeding) all   Problems Present (Breastfeeding) none

## 2018-09-02 NOTE — PROGRESS NOTES
OB/GYN Postpartum Note      S:  Patient is feeling better this morning.  Lochia light.  Eating and drinking without nausea.  Ambulating without difficulty.  No flatus yet.  Pain is well controlled. Breastfeeding going well but her nipples are getting sore.    O:   Vitals:    18 0530 18 0600 18 0800 18 1500   BP: 104/64 98/63 113/76 110/71   BP Location:       Pulse: 61 55 68 78   Resp:    Temp:    98  F (36.7  C)   TempSrc:       SpO2: 98% 98% 97% 98%     General: resting in bed, in NAD  Resp: nonlabored  Abdomen: soft, nontender, nondistended  Fundus firm at umbilicus  Incision: c/d/i  Extremities: no edema in BLE    Hemoglobin   Date Value Ref Range Status   2018 9.3 (L) 11.7 - 15.7 g/dL Final   ]    A: Ms. Ann Lopez is a 38 year old  POD #1 s/p PLTCS for category II FHT remote from delivery    P:  Heme 11.7 > EBL 1000 > 9.3  GI: tolerating regular diet  Feeding: breast  Contraception: plans abstinence until after hip surgery  Rubella Immune  Rh positive  Disposition: routine PP cares, considering discharge this evening vs tomorrow    Sona Newman MD  OB/GYN  2018 10:18 AM

## 2018-09-03 VITALS
HEART RATE: 79 BPM | OXYGEN SATURATION: 97 % | DIASTOLIC BLOOD PRESSURE: 80 MMHG | TEMPERATURE: 97 F | RESPIRATION RATE: 16 BRPM | SYSTOLIC BLOOD PRESSURE: 120 MMHG

## 2018-09-03 PROCEDURE — 99024 POSTOP FOLLOW-UP VISIT: CPT | Performed by: OBSTETRICS & GYNECOLOGY

## 2018-09-03 PROCEDURE — 25000132 ZZH RX MED GY IP 250 OP 250 PS 637: Performed by: OBSTETRICS & GYNECOLOGY

## 2018-09-03 RX ORDER — OXYCODONE AND ACETAMINOPHEN 5; 325 MG/1; MG/1
1-2 TABLET ORAL EVERY 4 HOURS PRN
Qty: 10 TABLET | Refills: 0 | Status: SHIPPED | OUTPATIENT
Start: 2018-09-03 | End: 2018-10-15

## 2018-09-03 RX ADMIN — IBUPROFEN 600 MG: 600 TABLET ORAL at 06:39

## 2018-09-03 RX ADMIN — IBUPROFEN 600 MG: 600 TABLET ORAL at 12:07

## 2018-09-03 RX ADMIN — IBUPROFEN 600 MG: 600 TABLET ORAL at 01:13

## 2018-09-03 NOTE — PROGRESS NOTES
[place] [facility] DISCHARGE NOTE    Patient discharged to home at 1:20 PM via ambulation. Accompanied by spouse and son and staff. Discharge instructions reviewed with patient and spouse, opportunity offered to ask questions. Prescriptions sent with patient to fill . All belongings sent with patient.    Tiffanie Christensen

## 2018-09-03 NOTE — PLAN OF CARE
Problem: Postpartum ( Delivery) (Adult,Obstetrics,Pediatric)  Intervention: Monitor/Manage Pain  Pain controlled with ibuprofen, abdominal binder and ambulation.

## 2018-09-03 NOTE — PROGRESS NOTES
OB/GYN Postpartum Note      S:  Patient is feeling well this morning, ready to go home.  Lochia light.  Eating and drinking without nausea.  Ambulating without difficulty.  +Flatus and BM this morning.  Pain is well controlled. Breastfeeding without questions or concerns.      O:   Vitals:    18 1500 18 1132 18 1728 18 0113   BP: 110/71 129/77 123/68 120/80   BP Location:   Left arm Right arm   Pulse: 78 93 79    Resp:    Temp: 98  F (36.7  C) 97.3  F (36.3  C) 97.2  F (36.2  C) 97  F (36.1  C)   TempSrc:  Tympanic Tympanic Temporal   SpO2: 98%   97%     General: resting in bed, in NAD  Resp: nonlabored  Abdomen: soft, nontender, nondistended  Fundus firm at umbilicus  Incision: c/d/i  Extremities: 1+ edema in BLE    Hemoglobin   Date Value Ref Range Status   2018 9.3 (L) 11.7 - 15.7 g/dL Final   ]    A: Ms. Ann Lopez is a 38 year old  POD #2 s/p PLTCS for category II FHT remote from delivery    P:  Heme 11.7 > EBL 1000 > 9.3  GI: tolerating regular diet  Feeding: breast  Contraception: plans abstinence until after hip surgery  Rubella Immune  Rh positive  Disposition: routine PP cares, discharge today    Sona Newman MD  OB/GYN  9/3/2018 9:43 AM

## 2018-09-03 NOTE — PLAN OF CARE
Problem: Postpartum ( Delivery) (Adult,Obstetrics,Pediatric)  Goal: Signs and Symptoms of Listed Potential Problems Will be Absent, Minimized or Managed (Postpartum)  Signs and symptoms of listed potential problems will be absent, minimized or managed by discharge/transition of care (reference Postpartum ( Delivery) (Adult,Obstetrics,Pediatric) CPG).   Outcome: Therapy, progress toward functional goals as expected   18 0709   Postpartum ( Delivery)   Problems Assessed (Postpartum ) all   Problems Present (Postpartum ) none   Assessments as charted. B/P: 120/80, T: 97, P: 79, R: 16. Rates pain: 5/10. Incision: Healing well, well approximated, without signs of infection and no drainage. Voiding without difficulty. Fundus firm U/1. Lochia: Light. Activity: moving with difficulty due to post op surgical pain. Infant feeding: Breast feeding going well.     LATCH Score:   Latch: 2 - Good Latch  Audible Swallowin - Few  Type of Nipple: (Breast/Nipple) 2 - Everted  Comfort: 2 - Soft, Nontender  Hold: 2 - No Assist   Total LATCH Score: 9    Postpartum breastfeeding assessment completed and education provided, see Patient Education Activity.  Items included in the education are:     proper positioning and latch    effectiveness of feeding    manual expression    handling and storing breastmilk    maintenance of breastfeeding for the first 6 months    sign/symptoms of infant feeding issues requiring referral to qualified health care provider  Postpartum care education provided, see Patient Education activity. Patient denies needs. Will monitor.  Niki Vega RN

## 2018-09-05 ENCOUNTER — HOSPITAL ENCOUNTER (OUTPATIENT)
Dept: OBGYN | Facility: OTHER | Age: 38
End: 2018-09-05
Attending: OBSTETRICS & GYNECOLOGY
Payer: COMMERCIAL

## 2018-09-05 ENCOUNTER — LACTATION ENCOUNTER (OUTPATIENT)
Age: 38
End: 2018-09-05

## 2018-09-05 PROCEDURE — S9443 LACTATION CLASS: HCPCS | Performed by: REGISTERED NURSE

## 2018-09-05 NOTE — LACTATION NOTE
This note was copied from a baby's chart.  Outpatient Lactation Visit    Devin Lopez  [id number]    Consultation Date: 2018     Reason for Lactation Referral: Initial Lactation Consult    Baby's : 2018    Baby's Current Age: 4 day old  Baby's Gestational Age: Gestational Age: 40w3d    Primary Care Provider: No primary care provider on file.    Presenting Problem (concerns as stated by parent): none    MATERNAL HISTORY   History of Breast Surgery: no  Breast Changes During Pregnancy: no  Breast Feeding History: primigravida  Maternal Meds: daily prenatal vitamin  Pregnancy Complications: none  Anesthesia during labor: spinal    MATERNAL ASSESSMENT    Breast Size: average, symmetrical, soft after feeding and filling prior to feeding  Nipple Appearance - Left: slightly cracked, with signs of healing, education on further healing techniques provided  Nipple Appearance - Right: slightly cracked, with signs of healing, education on further healing techniques provided  Nipple Erectility - Left: erect with stimulation  Nipple Erectility - Right: erect with stimulation  Areolas Compressibility: soft  Nipple Size: average  Special Equipment Used: none  Day mother reports milk came in:  Day 3    INFANT ASSESSMENT    Oral Anatomy  Mouth: normal  Palate: normal  Jaw: normal  Tongue: normal  Frenulum: normal   Digital Suck Exam: root    FEEDING   Feeding Time: aggressively for 25 minutes  Position:  cradle  Effort to Latch: awake and alert, latched easily  Duration of Breast Feeding: Right Breast: 25 minutes; Left Breast: 0  Results: excellent breast feed    Volume of Intake:    Birth Weight: 7 lb 14.6 oz    Hospital discharge weight: 7 lb 5.5 oz    Today's Weight 7 lb 7.8 oz    Total Intake: 1.3 oz  Output: 3-4 soil diapers in last 24 hours, 3-4 wet diapers in last 24 hours    LATCH Score:   Latch: 2 - Good Latch  Audible Swallowin - Spontaneous & frequent  Type of Nipple: (Breast/Nipple) 2 -  Everted  Comfort: 2 - Soft, Nontender  Hold: 2 - No Assist   Total LATCH Score:  10    FEEDING PLAN    Home Feeding Plan: Continue to feed on demand when  elicits feeding cues with deep latch.  Babe should be eating 8-12 times in a 24 hour period.  Exclusivity explained and encouraged in the early weeks to establish breastfeeding and order in milk supply.  Rooming-in encouraged with explanation of the benefits.  Continue to apply expressed breast milk and Lanolin cream to nipples after feedings for healing and comfort.  Postpartum breastfeeding assessment completed and education provided.  Items included in the education are:     proper positioning and latch    effectiveness of feeding    manual expression    handling and storing breastmilk    maintenance of breastfeeding for the first 6 months    sign/symptoms of infant feeding issues requiring referral to qualified health care provider    LACTATION COMMENTS   Deep latch explained for proper positioning of breast in infant's mouth, maximizing milk transfer and comfort.  Reassurance and encouragement provided in regard to mom's concerns about milk supply.  Follow-up support information provided.  Parents plan to keep  Well-Child Check with Dr. Johnson as scheduled for 2 week well child check.      Face-to-face Time: 60 minutes with assessment and education.    Shalonda Cedeno  2018  12:49 PM

## 2018-09-14 ENCOUNTER — OFFICE VISIT (OUTPATIENT)
Dept: OBGYN | Facility: OTHER | Age: 38
End: 2018-09-14
Attending: OBSTETRICS & GYNECOLOGY
Payer: COMMERCIAL

## 2018-09-14 VITALS
HEART RATE: 68 BPM | RESPIRATION RATE: 16 BRPM | WEIGHT: 172.5 LBS | SYSTOLIC BLOOD PRESSURE: 120 MMHG | BODY MASS INDEX: 23.4 KG/M2 | TEMPERATURE: 98.5 F | DIASTOLIC BLOOD PRESSURE: 60 MMHG

## 2018-09-14 DIAGNOSIS — Z98.891 S/P CESAREAN SECTION: Primary | ICD-10-CM

## 2018-09-14 PROCEDURE — 99024 POSTOP FOLLOW-UP VISIT: CPT | Performed by: OBSTETRICS & GYNECOLOGY

## 2018-09-14 ASSESSMENT — PAIN SCALES - GENERAL: PAINLEVEL: NO PAIN (0)

## 2018-09-14 NOTE — PROGRESS NOTES
Postoperative Visit  2018    S: Ann Lopez is a 38 year old  here for post-operative visit following PLTCS on 18. She reports feeling well, just tired. Not getting much sleep but mood has been good. Bowels and bladder working well. Lochia almost stopped. Pain better this week. Breastfeeding is going well    O:   /60 (BP Location: Right arm, Patient Position: Sitting, Cuff Size: Adult Large)  Pulse 68  Temp 98.5  F (36.9  C) (Oral)  Resp 16  Wt 78.2 kg (172 lb 8 oz)  Breastfeeding? Yes  BMI 23.4 kg/m2  Gen:  Well-appearing, NAD  Abd: soft, nondistended, nontender. Incision healing well    A/P:   Ms. Ann Lopez is a 38 year old  two weeks s/p PLTCS for category II FHT. Doing well, no concerns  RTC 4 weeks for postpartum visit. Plans abstinence for contraception (having hip surgery)    Sona Newman MD  OB/GYN  2018 11:04 AM

## 2018-09-14 NOTE — MR AVS SNAPSHOT
After Visit Summary   2018    Ann Lopez    MRN: [id number]           Patient Information     Date Of Birth          1980        Visit Information        Provider Department      2018 11:00 AM Sona Newman MD [facility] [place] [facility]        Today's Diagnoses     S/P  section    -  1       Follow-ups after your visit        Your next 10 appointments already scheduled     Oct 15, 2018 11:00 AM CDT   Post Partum with Tanesha Garza MD   [facility] ([facility] [place] [facility])    [address]  [place] [id number] [phone]              Who to contact     If you have questions or need follow up information about today's clinic visit or your schedule please contact [facility] directly at [phone].  Normal or non-critical lab and imaging results will be communicated to you by mphoriahart, letter or phone within 4 business days after the clinic has received the results. If you do not hear from us within 7 days, please contact the clinic through mphoriahart or phone. If you have a critical or abnormal lab result, we will notify you by phone as soon as possible.  Submit refill requests through Podaddies or call your pharmacy and they will forward the refill request to us. Please allow 3 business days for your refill to be completed.          Additional Information About Your Visit        MyChart Information     Podaddies gives you secure access to your electronic health record. If you see a primary care provider, you can also send messages to your care team and make appointments. If you have questions, please call your primary care clinic.  If you do not have a primary care provider, please call [phone] and they will assist you.        Care EveryWhere ID     This is your Care EveryWhere ID. This could be used by other organizations to access your [place] medical records  [id number]         Your Vitals Were     Pulse Temperature Respirations Breastfeeding? BMI (Body Mass Index)       68 98.5  F (36.9  C) (Oral) 16 Yes 23.4 kg/m2        Blood Pressure from Last 3 Encounters:   09/14/18 120/60   09/03/18 120/80   08/24/18 114/80    Weight from Last 3 Encounters:   09/14/18 78.2 kg (172 lb 8 oz)   08/24/18 89.9 kg (198 lb 2 oz)   08/17/18 89.5 kg (197 lb 6 oz)              Today, you had the following     No orders found for display       Primary Care Provider Office Phone # Fax #    Tanesha PARKER Del Garza [phone]       [address] [facility] [address]        Equal Access to Services     ALICE ETIENNE : Jeffery combs Sodayna, waaxda luqadaha, qaybta kaalmada adeegyada, shey mendoza . So [facility] [phone].    ATENCIÓN: Si habla español, tiene a berman disposición servicios gratuitos de asistencia lingüística. Llame al [phone].    We comply with applicable federal civil rights laws and [place] laws. We do not discriminate on the basis of race, color, national origin, age, disability, sex, sexual orientation, or gender identity.            Thank you!     Thank you for choosing [facility] AND [facility]  for your care. Our goal is always to provide you with excellent care. Hearing back from our patients is one way we can continue to improve our services. Please take a few minutes to complete the written survey that you may receive in the mail after your visit with us. Thank you!             Your Updated Medication List - Protect others around you: Learn how to safely use, store and throw away your medicines at www.disposemymeds.org.          This list is accurate as of 9/14/18 12:04 PM.  Always use your most recent med list.                   Brand Name Dispense Instructions for use Diagnosis    acyclovir 400 MG tablet    ZOVIRAX    80 tablet    Take 1 tablet (400 mg) by mouth 2 times daily    History of herpes genitalis        breast pump Misc     1 each    Reason for need: breastfeeding. Length of need: 1 year    Supervision of high risk pregnancy in third trimester       [employer] PRENATAL 28-0.8 MG Tabs      Take 1 tablet by mouth daily        FISH OIL PO      Take 1 capsule by mouth daily        oxyCODONE-acetaminophen 5-325 MG per tablet    PERCOCET    10 tablet    Take 1-2 tablets by mouth every 4 hours as needed for other (pain control or improvement in physical function. Hold dose for analgesic side effects.)    S/P  section

## 2018-09-14 NOTE — NURSING NOTE
Patient presents today for her two week postop visit following a  section.  Belem Nixon LPN  2018  11:00 AM             Chief Complaint   Patient presents with     Surgical Followup     two week postop,       Initial /60 (BP Location: Right arm, Patient Position: Sitting, Cuff Size: Adult Large)  Pulse 68  Temp 98.5  F (36.9  C) (Oral)  Resp 16  Wt 78.2 kg (172 lb 8 oz)  Breastfeeding? Yes  BMI 23.4 kg/m2 Estimated body mass index is 23.4 kg/(m^2) as calculated from the following:    Height as of 18: 1.829 m (6').    Weight as of this encounter: 78.2 kg (172 lb 8 oz).  Medication Reconciliation: complete    Belem Nixon LPN

## 2018-10-15 ENCOUNTER — PRENATAL OFFICE VISIT (OUTPATIENT)
Dept: FAMILY MEDICINE | Facility: OTHER | Age: 38
End: 2018-10-15
Attending: FAMILY MEDICINE
Payer: COMMERCIAL

## 2018-10-15 VITALS
BODY MASS INDEX: 24.01 KG/M2 | HEART RATE: 72 BPM | RESPIRATION RATE: 18 BRPM | TEMPERATURE: 98 F | DIASTOLIC BLOOD PRESSURE: 86 MMHG | WEIGHT: 177 LBS | SYSTOLIC BLOOD PRESSURE: 110 MMHG

## 2018-10-15 DIAGNOSIS — S73.191S TEAR OF RIGHT ACETABULAR LABRUM, SEQUELA: ICD-10-CM

## 2018-10-15 DIAGNOSIS — Z28.21 REFUSED INFLUENZA VACCINE: ICD-10-CM

## 2018-10-15 PROBLEM — S73.191A LABRAL TEAR OF RIGHT HIP JOINT: Status: ACTIVE | Noted: 2018-01-01

## 2018-10-15 PROBLEM — O09.519 SUPERVISION OF HIGH-RISK PREGNANCY OF ELDERLY PRIMIGRAVIDA: Status: RESOLVED | Noted: 2018-05-18 | Resolved: 2018-10-15

## 2018-10-15 LAB — HGB BLD-MCNC: 13.5 G/DL (ref 11.7–15.7)

## 2018-10-15 PROCEDURE — 36415 COLL VENOUS BLD VENIPUNCTURE: CPT | Performed by: FAMILY MEDICINE

## 2018-10-15 PROCEDURE — 99207 ZZC POST-PARTUM 6 WK VISIT - GICH ONLY: CPT | Performed by: FAMILY MEDICINE

## 2018-10-15 PROCEDURE — 85018 HEMOGLOBIN: CPT | Performed by: FAMILY MEDICINE

## 2018-10-15 ASSESSMENT — PAIN SCALES - GENERAL: PAINLEVEL: NO PAIN (0)

## 2018-10-15 NOTE — MR AVS SNAPSHOT
After Visit Summary   10/15/2018    Ann Lopez    MRN: [id number]           Patient Information     Date Of Birth          1980        Visit Information        Provider Department      10/15/2018 11:00 AM Tanesha Hawk MD [facility]        Today's Diagnoses     Routine postpartum follow-up    -  1    Tear of right acetabular labrum, sequela        Refused influenza vaccine           Follow-ups after your visit        Your next 10 appointments already scheduled     Nov 16, 2018 11:45 AM CST   Pre-Op physical with Tanesha Garza MD   [facility] ([facility])    [address]  [place] [address] [phone]              Who to contact     If you have questions or need follow up information about today's clinic visit or your schedule please contact [facility] directly at [phone].  Normal or non-critical lab and imaging results will be communicated to you by [email or web address], letter or phone within 4 business days after the clinic has received the results. If you do not hear from us within 7 days, please contact the clinic through [email or web address] or phone. If you have a critical or abnormal lab result, we will notify you by phone as soon as possible.  Submit refill requests through [email or web address] or call your pharmacy and they will forward the refill request to us. Please allow 3 business days for your refill to be completed.          Additional Information About Your Visit        MyChart Information     [email or web address] gives you secure access to your electronic health record. If you see a primary care provider, you can also send messages to your care team and make appointments. If you have questions, please call your primary care clinic.  If you do not have a primary care provider, please call [phone] and they will assist you.        Care EveryWhere ID     This is your Care EveryWhere ID.  This could be used by other organizations to access your [place] medical records  [id number]        Your Vitals Were     Pulse Temperature Respirations Breastfeeding? BMI (Body Mass Index)       72 98  F (36.7  C) (Tympanic) 18 Yes 24.01 kg/m2        Blood Pressure from Last 3 Encounters:   10/15/18 110/86   09/14/18 120/60   09/03/18 120/80    Weight from Last 3 Encounters:   10/15/18 177 lb (80.3 kg)   09/14/18 172 lb 8 oz (78.2 kg)   08/24/18 198 lb 2 oz (89.9 kg)              We Performed the Following     Hemoglobin        Primary Care Provider Office Phone # Fax #    Tanesha PARKER Del Garza [phone]       [address] [facility] [address]        Equal Access to Services     ALICE ETIENNE : Hadii aad ku hadasho Soomaali, waaxda luqadaha, qaybta kaalmada taiyatank, shey mendoza . So [facility] [phone].    ATENCIÓN: Si habla español, tiene a berman disposición servicios gratuitos de asistencia lingüística. Cecil al [phone].    We comply with applicable federal civil rights laws and [place] laws. We do not discriminate on the basis of race, color, national origin, age, disability, sex, sexual orientation, or gender identity.            Thank you!     Thank you for choosing [facility] AND \[facility]\""  for your care. Our goal is always to provide you with excellent care. Hearing back from our patients is one way we can continue to improve our services. Please take a few minutes to complete the written survey that you may receive in the mail after your visit with us. Thank you!             Your Updated Medication List - Protect others around you: Learn how to safely use, store and throw away your medicines at www.disposemymeds.org.          This list is accurate as of 10/15/18  2:06 PM.  Always use your most recent med list.                   Brand Name Dispense Instructions for use Diagnosis    breast pump Misc     1 each    Reason for need:  breastfeeding. Length of need: 1 year    Supervision of high risk pregnancy in third trimester

## 2018-10-15 NOTE — NURSING NOTE
Patient is here for 6 week postpartum check.  Lyndsey Michelle LPN .............10/15/2018     11:09 AM

## 2018-10-15 NOTE — PROGRESS NOTES
Ann is here for a 6-week postpartum checkup.  She delivered via  at term.  Male infant.  Since delivery, she has been breast feeding.  She has no signs of infection, bleeding or other complications.  She is not pregnant.  We discussed contraceptions and she has chosen absitnence related to upcoming hip surgery..      Post partum tubal: No  History of Gestational Diabetes? No  Type of Delivery:    Feeding Method:  Breast      REVIEW OF SYSTEMS:  Ears/Nose/Throat: negative  Respiratory: negative  Cardiovascular: negative  Gastrointestinal: negative  Genitourinary: negative  Musculoskeletal: right hip with known labral tear  Neurologic: negative   Skin: negative   Endocrine:  negative      Contraception Plan: none    Medical History Reviewed no  Family History Reviewed no  Problem List Updated no    EXAM:  /86 (BP Location: Right arm, Patient Position: Sitting, Cuff Size: Adult Regular)  Pulse 72  Temp 98  F (36.7  C) (Tympanic)  Resp 18  Wt 177 lb (80.3 kg)  Breastfeeding? Yes  BMI 24.01 kg/m2  HEENT: grossly normal.  NECK: no lymphadenopathy or thyroidomegaly.  LUNGS: CTA X 2, no rales or crackles.  BACK: No spinal or CVA tenderness.  HEART: RRR without murmurs clicks or gallops.  ABDOMEN: incision well healed  EXTREMITIES: right hip with decreased ROM  NEUROLOGIC: grossly normal.    ASSESSMENT:     ICD-10-CM    1. Routine postpartum follow-up Z39.2 Hemoglobin     Hemoglobin     CANCELED: OB HEMOGLOBIN   2. Tear of right acetabular labrum, sequela S73.191S    3. Refused influenza vaccine Z28.21        PLAN:    Recheck of hemoglobin today.  Advance activity as tolerated.  Flu vaccine offered today and pt refused.  Pap smear is up to date.  Has orthopedics follow up scheduled next month  Encouraged breast feeding.  Tanesha Johnson MD

## 2018-11-16 ENCOUNTER — OFFICE VISIT (OUTPATIENT)
Dept: FAMILY MEDICINE | Facility: OTHER | Age: 38
End: 2018-11-16
Attending: FAMILY MEDICINE
Payer: COMMERCIAL

## 2018-11-16 VITALS
SYSTOLIC BLOOD PRESSURE: 106 MMHG | BODY MASS INDEX: 24.11 KG/M2 | OXYGEN SATURATION: 99 % | WEIGHT: 178 LBS | HEART RATE: 68 BPM | HEIGHT: 72 IN | DIASTOLIC BLOOD PRESSURE: 80 MMHG

## 2018-11-16 DIAGNOSIS — Z01.818 PREOP GENERAL PHYSICAL EXAM: Primary | ICD-10-CM

## 2018-11-16 DIAGNOSIS — S73.191S TEAR OF RIGHT ACETABULAR LABRUM, SEQUELA: ICD-10-CM

## 2018-11-16 PROBLEM — O09.513 ADVANCED MATERNAL AGE, 1ST PREGNANCY, THIRD TRIMESTER: Status: RESOLVED | Noted: 2018-08-29 | Resolved: 2018-11-16

## 2018-11-16 LAB — HCG UR QL: NEGATIVE

## 2018-11-16 PROCEDURE — 81025 URINE PREGNANCY TEST: CPT | Performed by: FAMILY MEDICINE

## 2018-11-16 PROCEDURE — 99214 OFFICE O/P EST MOD 30 MIN: CPT | Performed by: FAMILY MEDICINE

## 2018-11-16 ASSESSMENT — PAIN SCALES - GENERAL: PAINLEVEL: NO PAIN (0)

## 2018-11-16 NOTE — NURSING NOTE
Patient presents to the clinic today for a preop.    Cristel Rea LPN.................. 11/16/2018 11:51 AM

## 2018-11-16 NOTE — MR AVS SNAPSHOT
After Visit Summary   11/16/2018    Ann Lopez    MRN: [id number]           Patient Information     Date Of Birth          1980        Visit Information        Provider Department      11/16/2018 11:45 AM Tanesha Hawk MD [facility]        Today's Diagnoses     Preop general physical exam    -  1    Tear of right acetabular labrum, sequela        Patient is a currently breast-feeding mother          Care Instructions      Before Your Surgery      Call your surgeon if there is any change in your health. This includes signs of a cold or flu (such as a sore throat, runny nose, cough, rash or fever).    Do not smoke, drink alcohol or take over the counter medicine (unless your surgeon or primary care doctor tells you to) for the 24 hours before and after surgery.    If you take prescribed drugs: Follow your doctor s orders about which medicines to take and which to stop until after surgery.    Eating and drinking prior to surgery: follow the instructions from your surgeon    Take a shower or bath the night before surgery. Use the soap your surgeon gave you to gently clean your skin. If you do not have soap from your surgeon, use your regular soap. Do not shave or scrub the surgery site.  Wear clean pajamas and have clean sheets on your bed.           Follow-ups after your visit        Future tests that were ordered for you today     Open Future Orders        Priority Expected Expires Ordered    HCG quantitative pregnancy Routine  11/16/2019 11/16/2018            Who to contact     If you have questions or need follow up information about today's clinic visit or your schedule please contact [facility] directly at [phone].  Normal or non-critical lab and imaging results will be communicated to you by MyChart, letter or phone within 4 business days after the clinic has received the results. If you do not hear from us within 7 days,  please contact the clinic through Signdat or phone. If you have a critical or abnormal lab result, we will notify you by phone as soon as possible.  Submit refill requests through Signdat or call your pharmacy and they will forward the refill request to us. Please allow 3 business days for your refill to be completed.          Additional Information About Your Visit        ZoomaalharFIGS Information     Signdat gives you secure access to your electronic health record. If you see a primary care provider, you can also send messages to your care team and make appointments. If you have questions, please call your primary care clinic.  If you do not have a primary care provider, please call [phone] and they will assist you.        Care EveryWhere ID     This is your Care EveryWhere ID. This could be used by other organizations to access your [place] medical records  [id number]        Your Vitals Were     Pulse Height Pulse Oximetry Breastfeeding? BMI (Body Mass Index)       68 6' (1.829 m) 99% Yes 24.14 kg/m2        Blood Pressure from Last 3 Encounters:   11/16/18 106/80   10/15/18 110/86   09/14/18 120/60    Weight from Last 3 Encounters:   11/16/18 178 lb (80.7 kg)   10/15/18 177 lb (80.3 kg)   09/14/18 172 lb 8 oz (78.2 kg)              We Performed the Following     Pregnancy, Urine (HCG)          Today's Medication Changes          These changes are accurate as of 11/16/18  1:39 PM.  If you have any questions, ask your nurse or doctor.               Stop taking these medicines if you haven't already. Please contact your care team if you have questions.     breast pump Misc   Stopped by:  Tanesha Hawk MD                    Primary Care Provider Office Phone # Fax #    Tanesha Garza [phone] [email or web address] [facility] [address]        Equal Access to Services     SHERRY ETIENNE : Jeffery Alberto, mary bourne, guero tate,  shey zamoranoangie dean'aan ah. So [facility] [phone].    ATENCIÓN: Si habla clintañol, tiene a berman disposición servicios gratuitos de asistencia lingüística. Cecil al [phone].    We comply with applicable federal civil rights laws and [place] laws. We do not discriminate on the basis of race, color, national origin, age, disability, sex, sexual orientation, or gender identity.            Thank you!     Thank you for choosing [facility] [place] [facility]  for your care. Our goal is always to provide you with excellent care. Hearing back from our patients is one way we can continue to improve our services. Please take a few minutes to complete the written survey that you may receive in the mail after your visit with us. Thank you!             Your Updated Medication List - Protect others around you: Learn how to safely use, store and throw away your medicines at www.disposemymeds.org.      Notice  As of 11/16/2018  1:39 PM    You have not been prescribed any medications.

## 2018-11-16 NOTE — PROGRESS NOTES
[facility]  [address]  [place] [address]  [phone]    PRE-OP EVALUATION:  Today's date: 2018    Ann Lopez (: 1980) presents for pre-operative evaluation assessment as requested by Dr. Colindres.  She requires evaluation and anesthesia risk assessment prior to undergoing surgery/procedure for treatment of being a breast feeding mom .    Proposed Surgery/ Procedure: R hip arthroscopy   Date of Surgery/ Procedure: 18  Time of Surgery/ Procedure:   Hospital/Surgical Facility: [facility]  Primary Physician: Tanesha Hawk  Type of Anesthesia Anticipated: General    Patient has a Health Care Directive or Living Will:  NO    1. NO - Do you have a history of heart attack, stroke, stent, bypass or surgery on an artery in the head, neck, heart or legs?  2. NO - Do you ever have any pain or discomfort in your chest?  3. NO - Do you have a history of  Heart Failure?  4. NO - Are you troubled by shortness of breath when: walking on the level, up a slight hill or at night?  5. NO - Do you currently have a cold, bronchitis or other respiratory infection?  6. NO - Do you have a cough, shortness of breath or wheezing?  7. NO - Do you sometimes get pains in the calves of your legs when you walk?  8. NO - Do you or anyone in your family have previous history of blood clots?  9. NO - Do you or does anyone in your family have a serious bleeding problem such as prolonged bleeding following surgeries or cuts?  10. NO - Have you ever had problems with anemia or been told to take iron pills?  11. NO - Have you had any abnormal blood loss such as black, tarry or bloody stools, or abnormal vaginal bleeding?  12. NO - Have you ever had a blood transfusion?  13. NO - Have you or any of your relatives ever had problems with anesthesia?  14. NO - Do you have sleep apnea, excessive snoring or daytime drowsiness?  15. NO - Do you have any prosthetic heart valves?  16.  NO - Do you have prosthetic joints?  17. NO - Is there any chance that you may be pregnant?      HPI:     HPI related to upcoming procedure: Ann Lopez is a 38 year old female in for preop evaluation prior to right hip surgery.  She's had symptoms for 1.5 years - was training for a marathon at that time.  She was subsequently diagnosed with a right acetabular labral tear.  Due to pain and decreased range of motion, this limits some of her physical activities.  In particular, activities that involve bending or squatting are more painful for her.  She put off having her surgery while she was pregnant, is now more than 2 months postpartum.    She is breast-feeding, this is going well.  She does intermittently pump.      MEDICAL HISTORY:     Patient Active Problem List    Diagnosis Date Noted     Patient is a currently breast-feeding mother 2018     Priority: Medium     S/P  section 2018     Priority: Medium     Labral tear of right hip joint 2018     Priority: Medium     Encounter for infertility counseling 10/08/2017     Priority: Medium     Vitamin D deficiency 10/05/2016     Priority: Medium      Past Medical History:   Diagnosis Date     Dysmenorrhea     2006     Labral tear of right hip joint      Other herpesviral infection     2004,genitial     Stress fracture     left hip     Vitamin D deficiency     10/5/2016     Past Surgical History:   Procedure Laterality Date      SECTION N/A 2018    Procedure:  SECTION;;  Surgeon: Sona Newman MD;  Location:  OR     [facility] REFRACTIVE NEW PT IN 2012     No current outpatient prescriptions on file.       No Known Allergies   Latex Allergy: NO    Social History   Substance Use Topics     Smoking status: Never Smoker     Smokeless tobacco: Never Used     Alcohol use No     History   Drug Use No     Comment: Drug use: No       REVIEW OF SYSTEMS:   CONSTITUTIONAL: NEGATIVE for fever,  chills, change in weight  ENT/MOUTH: NEGATIVE for ear, mouth and throat problems  RESP: NEGATIVE for significant cough or SOB  CV: NEGATIVE for chest pain, palpitations or peripheral edema    EXAM:   /80 (BP Location: Right arm, Patient Position: Sitting, Cuff Size: Adult Regular)  Pulse 68  Ht 6' (1.829 m)  Wt 178 lb (80.7 kg)  SpO2 99%  Breastfeeding? Yes  BMI 24.14 kg/m2  GENERAL APPEARANCE: healthy, alert and no distress  HENT: ear canals and TM's normal and nose and mouth without ulcers or lesions  RESP: lungs clear to auscultation - no rales, rhonchi or wheezes  CV: regular rate and rhythm, normal S1 S2, no S3 or S4 and no murmur, click or rub   ABDOMEN: soft, nontender, no HSM or masses and bowel sounds normal  NEURO: Normal strength and tone, sensory exam grossly normal, mentation intact and speech normal  Musculoskeletal: Decreased range of motion in her right hip, in particular with external rotation.    DIAGNOSTICS:     Results for orders placed or performed in visit on 11/16/18   Pregnancy, Urine (HCG)   Result Value Ref Range    HCG Qual Urine Negative NEG^Negative         Recent Labs   Lab Test  10/15/18   1147  09/02/18   0535  08/29/18   1720  05/18/18   1026   10/09/17   1304   HGB  13.5  9.3*  11.7  13.2   < >   --    PLT   --    --   132*  167   < >   --    NA   --    --    --    --    --   135   POTASSIUM   --    --    --    --    --   3.8   CR   --    --    --    --    --   1.03    < > = values in this interval not displayed.        IMPRESSION:       The proposed surgical procedure is considered LOW risk.      The patient has the following additional risks for perioperative complications:  No identified additional risks      ICD-10-CM    1. Preop general physical exam Z01.818 HCG quantitative pregnancy     Pregnancy, Urine (HCG)   2. Tear of right acetabular labrum, sequela S73.191S    3. Patient is a currently breast-feeding mother Z39.1        RECOMMENDATIONS:       APPROVAL GIVEN  to proceed with proposed procedure, without further diagnostic evaluation.  Discussed breast-feeding recommendations.       Signed Electronically by: ALLEN REYES MD    Copy of this evaluation report is provided to requesting physician.    [facility] Preop Guidelines    Revised Cardiac Risk Index

## 2019-08-09 ENCOUNTER — MEDICAL CORRESPONDENCE (OUTPATIENT)
Dept: HEALTH INFORMATION MANAGEMENT | Facility: OTHER | Age: 39
End: 2019-08-09

## 2019-08-09 DIAGNOSIS — E55.9 VITAMIN D DEFICIENCY: ICD-10-CM

## 2019-08-09 DIAGNOSIS — E07.9 DISEASE OF THYROID GLAND: Primary | ICD-10-CM

## 2019-08-09 LAB
ALBUMIN SERPL-MCNC: 4 G/DL (ref 3.5–5.7)
ALP SERPL-CCNC: 68 U/L (ref 34–104)
ALT SERPL W P-5'-P-CCNC: 33 U/L (ref 7–52)
ANION GAP SERPL CALCULATED.3IONS-SCNC: 7 MMOL/L (ref 3–14)
AST SERPL W P-5'-P-CCNC: 18 U/L (ref 13–39)
BASOPHILS # BLD AUTO: 0 10E9/L (ref 0–0.2)
BASOPHILS NFR BLD AUTO: 0.9 %
BILIRUB SERPL-MCNC: 0.6 MG/DL (ref 0.3–1)
BUN SERPL-MCNC: 17 MG/DL (ref 7–25)
CALCIUM SERPL-MCNC: 9.1 MG/DL (ref 8.6–10.3)
CHLORIDE SERPL-SCNC: 108 MMOL/L (ref 98–107)
CO2 SERPL-SCNC: 24 MMOL/L (ref 21–31)
CREAT SERPL-MCNC: 1.03 MG/DL (ref 0.6–1.2)
DEPRECATED CALCIDIOL+CALCIFEROL SERPL-MC: 39.3 NG/ML
DIFFERENTIAL METHOD BLD: ABNORMAL
EOSINOPHIL # BLD AUTO: 0.1 10E9/L (ref 0–0.7)
EOSINOPHIL NFR BLD AUTO: 3 %
ERYTHROCYTE [DISTWIDTH] IN BLOOD BY AUTOMATED COUNT: 11.9 % (ref 10–15)
GFR SERPL CREATININE-BSD FRML MDRD: 60 ML/MIN/{1.73_M2}
GLUCOSE SERPL-MCNC: 83 MG/DL (ref 70–105)
HBA1C MFR BLD: 5.1 % (ref 4–6)
HCT VFR BLD AUTO: 42.8 % (ref 35–47)
HGB BLD-MCNC: 14.1 G/DL (ref 11.7–15.7)
IMM GRANULOCYTES # BLD: 0 10E9/L (ref 0–0.4)
IMM GRANULOCYTES NFR BLD: 0.5 %
LYMPHOCYTES # BLD AUTO: 2 10E9/L (ref 0.8–5.3)
LYMPHOCYTES NFR BLD AUTO: 46 %
MCH RBC QN AUTO: 33.1 PG (ref 26.5–33)
MCHC RBC AUTO-ENTMCNC: 32.9 G/DL (ref 31.5–36.5)
MCV RBC AUTO: 101 FL (ref 78–100)
MONOCYTES # BLD AUTO: 0.3 10E9/L (ref 0–1.3)
MONOCYTES NFR BLD AUTO: 6.9 %
NEUTROPHILS # BLD AUTO: 1.9 10E9/L (ref 1.6–8.3)
NEUTROPHILS NFR BLD AUTO: 42.7 %
PLATELET # BLD AUTO: 148 10E9/L (ref 150–450)
POTASSIUM SERPL-SCNC: 4.2 MMOL/L (ref 3.5–5.1)
PROT SERPL-MCNC: 6.6 G/DL (ref 6.4–8.9)
RBC # BLD AUTO: 4.26 10E12/L (ref 3.8–5.2)
SODIUM SERPL-SCNC: 139 MMOL/L (ref 134–144)
T3 SERPL-MCNC: 127 NG/DL (ref 87–178)
T4 FREE SERPL-MCNC: 0.69 NG/DL (ref 0.6–1.6)
TSH SERPL DL<=0.05 MIU/L-ACNC: 2.53 IU/ML (ref 0.34–5.6)
WBC # BLD AUTO: 4.4 10E9/L (ref 4–11)

## 2019-08-09 PROCEDURE — 80053 COMPREHEN METABOLIC PANEL: CPT

## 2019-08-09 PROCEDURE — 82306 VITAMIN D 25 HYDROXY: CPT

## 2019-08-09 PROCEDURE — 36415 COLL VENOUS BLD VENIPUNCTURE: CPT

## 2019-08-09 PROCEDURE — 84479 ASSAY OF THYROID (T3 OR T4): CPT

## 2019-08-09 PROCEDURE — 84439 ASSAY OF FREE THYROXINE: CPT

## 2019-08-09 PROCEDURE — 84480 ASSAY TRIIODOTHYRONINE (T3): CPT

## 2019-08-09 PROCEDURE — 86800 THYROGLOBULIN ANTIBODY: CPT | Mod: ZL

## 2019-08-09 PROCEDURE — 84443 ASSAY THYROID STIM HORMONE: CPT | Mod: ZL

## 2019-08-09 PROCEDURE — 86376 MICROSOMAL ANTIBODY EACH: CPT

## 2019-08-09 PROCEDURE — 85025 COMPLETE CBC W/AUTO DIFF WBC: CPT | Mod: ZL

## 2019-08-09 PROCEDURE — 83036 HEMOGLOBIN GLYCOSYLATED A1C: CPT | Mod: ZL

## 2019-08-09 PROCEDURE — 84432 ASSAY OF THYROGLOBULIN: CPT

## 2019-08-09 PROCEDURE — 84442 ASSAY OF THYROID ACTIVITY: CPT | Mod: ZL

## 2019-08-11 LAB
T3RU NFR SERPL: 35 % (ref 28–41)
T4BG SERPL-MCNC: 25.7 UG/ML (ref 13–30)

## 2019-08-12 LAB
THYROGLOB AB SERPL IA-ACNC: <20 IU/ML (ref 0–40)
THYROGLOB SERPL-MCNC: 10.9 NG/ML
THYROPEROXIDASE AB SERPL-ACNC: 20 IU/ML

## 2019-10-14 ENCOUNTER — MEDICAL CORRESPONDENCE (OUTPATIENT)
Dept: LAB | Facility: OTHER | Age: 39
End: 2019-10-14

## 2019-10-14 DIAGNOSIS — R53.83 FATIGUE: Primary | ICD-10-CM

## 2019-10-14 LAB
BASOPHILS # BLD AUTO: 0.1 10E9/L (ref 0–0.2)
BASOPHILS NFR BLD AUTO: 1 %
DIFFERENTIAL METHOD BLD: ABNORMAL
EOSINOPHIL # BLD AUTO: 0.1 10E9/L (ref 0–0.7)
EOSINOPHIL NFR BLD AUTO: 2.4 %
ERYTHROCYTE [DISTWIDTH] IN BLOOD BY AUTOMATED COUNT: 12 % (ref 10–15)
FERRITIN SERPL-MCNC: 46 NG/ML (ref 23.9–336.2)
HCT VFR BLD AUTO: 44.2 % (ref 35–47)
HGB BLD-MCNC: 14.5 G/DL (ref 11.7–15.7)
IMM GRANULOCYTES # BLD: 0 10E9/L (ref 0–0.4)
IMM GRANULOCYTES NFR BLD: 0.2 %
IRON SATN MFR SERPL: 44 % (ref 20–55)
IRON SERPL-MCNC: 140 UG/DL (ref 50–212)
LYMPHOCYTES # BLD AUTO: 1.9 10E9/L (ref 0.8–5.3)
LYMPHOCYTES NFR BLD AUTO: 38.6 %
MCH RBC QN AUTO: 33 PG (ref 26.5–33)
MCHC RBC AUTO-ENTMCNC: 32.8 G/DL (ref 31.5–36.5)
MCV RBC AUTO: 101 FL (ref 78–100)
MONOCYTES # BLD AUTO: 0.4 10E9/L (ref 0–1.3)
MONOCYTES NFR BLD AUTO: 7.6 %
NEUTROPHILS # BLD AUTO: 2.5 10E9/L (ref 1.6–8.3)
NEUTROPHILS NFR BLD AUTO: 50.2 %
PLATELET # BLD AUTO: 167 10E9/L (ref 150–450)
RBC # BLD AUTO: 4.4 10E12/L (ref 3.8–5.2)
TIBC SERPL-MCNC: 316.4 UG/DL (ref 245–400)
UIBC (UNSATURATED): 176.4 MG/DL
VIT B12 SERPL-MCNC: 447 PG/ML (ref 180–914)
WBC # BLD AUTO: 5 10E9/L (ref 4–11)

## 2019-10-14 PROCEDURE — 82607 VITAMIN B-12: CPT | Mod: ZL

## 2019-10-14 PROCEDURE — 82728 ASSAY OF FERRITIN: CPT | Mod: ZL

## 2019-10-14 PROCEDURE — 86628 CANDIDA ANTIBODY: CPT | Mod: ZL,91

## 2019-10-14 PROCEDURE — 83540 ASSAY OF IRON: CPT | Mod: ZL

## 2019-10-14 PROCEDURE — 85025 COMPLETE CBC W/AUTO DIFF WBC: CPT | Mod: ZL

## 2019-10-14 PROCEDURE — 36415 COLL VENOUS BLD VENIPUNCTURE: CPT | Mod: ZL

## 2019-10-14 PROCEDURE — 83550 IRON BINDING TEST: CPT | Mod: ZL

## 2019-10-18 LAB
C ALBICANS IGA SER-ACNC: 0.38 EV
C ALBICANS IGG QN: 0.25 EV
C ALBICANS IGM SER-ACNC: 0.51 EV

## 2020-01-13 ENCOUNTER — THERAPY VISIT (OUTPATIENT)
Dept: CHIROPRACTIC MEDICINE | Facility: OTHER | Age: 40
End: 2020-01-13
Attending: CHIROPRACTOR
Payer: COMMERCIAL

## 2020-01-13 DIAGNOSIS — M54.2 CERVICALGIA: ICD-10-CM

## 2020-01-13 DIAGNOSIS — M54.6 PAIN IN THORACIC SPINE: ICD-10-CM

## 2020-01-13 DIAGNOSIS — M99.04 SEGMENTAL AND SOMATIC DYSFUNCTION OF SACRAL REGION: ICD-10-CM

## 2020-01-13 DIAGNOSIS — M99.02 SEGMENTAL AND SOMATIC DYSFUNCTION OF THORACIC REGION: ICD-10-CM

## 2020-01-13 DIAGNOSIS — M99.01 SEGMENTAL AND SOMATIC DYSFUNCTION OF CERVICAL REGION: Primary | ICD-10-CM

## 2020-01-13 DIAGNOSIS — M54.50 RIGHT-SIDED LOW BACK PAIN WITHOUT SCIATICA, UNSPECIFIED CHRONICITY: ICD-10-CM

## 2020-01-13 PROCEDURE — 99213 OFFICE O/P EST LOW 20 MIN: CPT | Mod: 25 | Performed by: CHIROPRACTOR

## 2020-01-13 PROCEDURE — 98941 CHIROPRACT MANJ 3-4 REGIONS: CPT | Mod: AT | Performed by: CHIROPRACTOR

## 2020-01-13 NOTE — PROGRESS NOTES
PATIENT:  Ann Lopez is a 39 year old female presenting for  Neck pain, secondary complaints of mid and low back pain.    PROBLEM:   Date of Initial Visit for this Episode:  2020    Visit #1    SUBJECTIVE / HPI: Patient presents with primary complaints of neck pain, mid back and right-sided low back/hip pain symptoms.  Neck and mid back pain have been ongoing for some time.  No traumatic events or overuse injuries noted with occurrence of neck pain or mid back pain.  Patient is currently caring for a  infant which requires cradling of infant and looking down frequently during the day which the patient believes to be contributing to neck and mid back pain symptoms.  Right-sided low back/hip pain symptoms related to recent hip surgery.  Approximately 13 months ago patient underwent arthroscopic repair right labrum.  Surgery was successful but patient continues to have limited mobility and pain of the right hip and low back.  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  Patient does try managing symptoms on her own with home exercises.  At this time they do not appear to be providing enough relief and the patient is becoming concerned that she will be unable to run [employer] this upcoming summer.  This prompted the patient to contact her office for further evaluation and treatment of symptoms    Description and onset:Right hip/mid back   Duration and Frequency of Pain: 13 months and constant  Radiation of pain: no  Pain rated at it's worst: 3/10  Pain rated currently:  2/10  Pain course: Not changing  Worse with:  Sleeping, rotation of the right hip, deep hip flexion  Improved by:  Activity, yoga, massage  Additional Features: history right labral repair, arthroscopic 13 months ago  Other Health Care Providers seen for this: Dr. Bravo SAAVEDRA, Dr. Jens NGUYEN  Previous treatment: Right hip arthroscopy, chiropractic,  consuelo  Previous injury:Right labral repair      Other Secondary/Associated Problems  Description, Duration and Location of Seondary Problem: mid/lower back pain    Duration and Frequency of Pain: on-going and frequently  Radiation of pain: no  Pain rated at it's worst: 7/10  Pain rated currently:  6/10  Pain course: symptoms come and go depending on activities during the day. Patient also concerned about pillow  Worse with:  flexion  Improved by:  stretching  Additional Features: unremarkable  Other Health Care Providers seen for this: Dr. Bravo SAAVEDRA  Previous treatment: chiropractic  Previous injury:patient reports similar symptoms in the past.    See flowsheets in chart for details.  2020   Neck Disability Index (  [employer]iano H[employer]. 1991. All rights reserved.; used with permission) 2020   SECTION 1 - PAIN INTENSITY 2   SECTION 2 - PERSONAL CARE 0   SECTION 3 - LIFTING 0   SECTION 4 - READING 1   SECTION 5 - HEADACHES 0   SECTION 6 - CONCENTRATION 0   SECTION 7 - WORK 0   SECTION 8 - DRIVING 1   SECTION 9 - SLEEPING 0   SECTION 10 - RECREATION 1   Count 10   Sum 5   Raw Score: /50 5   Neck Disability Index Score: (%) 10     Oswestry (LB) Questionnaire    OSWESTRY DISABILITY INDEX 2020   Count 9   Sum 5   Oswestry Score (%) 11.11        Functional limitations:  Reading, driving      Past D.C. Care: yes, helpful       Health History as reported by the patient: Excellent and Good      PAST MEDICAL HISTORY:  Past Medical History:   Diagnosis Date     Dysmenorrhea     2006     Labral tear of right hip joint      Other herpesviral infection     2004,genitial     Stress fracture 2000    left hip     Vitamin D deficiency     10/5/2016       PAST SURGICAL HISTORY:  Past Surgical History:   Procedure Laterality Date      SECTION N/A 2018    Procedure:  SECTION;;  Surgeon: Sona Newman MD;  Location:  OR     [facility] IN 2012        ALLERGIES:  No Known Allergies    CURRENT MEDICATIONS:  No current outpatient medications on file.       SOCIAL HISTORY:  Marital Status: .  Children: yes.  Occupation: Massage therapist/.  Alcohol use:none.  Tobacco use: Smoker: no.    FAMILY HISTORY:  Family History   Problem Relation Age of Onset     Family History Negative Mother         Good Health     Alcoholism Father         Alcoholism     Heart Disease Maternal Grandmother         Heart Disease     Emphysema Paternal Grandmother         Emphysema     Leukemia Paternal Grandfather         Leukemia     Thyroid Disease Sister         Thyroid Disease,borderline; infertility; 3 yrs younger     Thyroid Disease Other         Thyroid Disease,father's side of the family       Patient Active Problem List   Diagnosis     Encounter for infertility counseling     Vitamin D deficiency     S/P  section     Labral tear of right hip joint     Patient is a currently breast-feeding mother         ROS:  The patient denies any fevers, chills, nausea, vomiting, diarrhea, constipation,dysuria, hematuria, or urinary hesitancy or incontinence.  No shortness of breath, chest pain, or rashes.    OBJECTIVE:    DIAGNOSTICS:  No current spinal imaging taken.     PHYSICAL EXAM:     GENERAL APPEARANCE: healthy, alert, active, no distress and cooperative   GAIT: NORMAL      MUSCULOSKELETAL:   Posture: Generally fair/good.  Mild anterior head carriage with rounding of shoulders bilaterally.  Iliac crest and shoulders do appear even bilaterally.  Patient does not appear antalgic nor my seeing any signs of posterior rotation components of the patient's cervical, thoracic, or lumbopelvic spinal regions on today's visit.  Gait:  unremarkable.     Cervical performed actively, measured approximately  ROM:   smooth/halting arc of motion   45/50 flexion    40/45 extension    30/45 RLF  35/45 LLF    75/85 RR         75/85 LR       -Maximal Foraminal Compression:  Negative local complaints, negative radicular symptoms  -Distraction improves      Thoracic and Lumbar performed actively, measured approximately  ROM:  60/60 flexion 55/60 extension    35/45 RLF    40/45 LLF   45/45 RR      45/45 LR    +Kemps: right PSIS  - Straight leg raise  + JARED: right sacroiliac jt pain, on right restricted ROM.   Other:  -Ely's, -Nachlas    +Tenderness: Present right PSIS, left side T6, suboccipital ridge bilaterally   +Muscle spasm: Right scalenes, right gluteus medius.  Taut and tender fibers apparent T-spine paraspinals left side localized around T6, taut and tender fibers apparent suboccipitals bilaterally  +Joint asymmetry and restriction: C1 right lateral flexion left rotation restriction, C2 extension right rotation restriction, T6 extension left lateral flexion restriction, sacrum extension right lateral flexion restriction    ASSESSMENT: Ann Lopez is a 39 year old female presenting with complaints of neck and mid/lower back pain with low back symptoms predominantly on the right side.  Patient does appear to be a good candidate for chiropractic care and I do anticipate favorable response.  As the patient has recently undergone hip scope to repair labral tear on the right side we will also provide patient with home exercises to help as I do believe postsurgical status of right hip contributing to misalignment of patient's spine as we are seeing currently.     1. Segmental and somatic dysfunction of cervical region    2. Cervicalgia    3. Segmental and somatic dysfunction of thoracic region    4. Pain in thoracic spine    5. Segmental and somatic dysfunction of sacral region    6. Right-sided low back pain without sciatica, unspecified chronicity        PLAN    Evaluation and Management:  44236 Moderate exam established patient 20 min    Procedures:  Modalities:  None performed this visit    CMT:  [id number] Chiropractic manipulative treatment 3-4 regions performed   Cervical:  Diversified, C1 , C2, Supine  Thoracic: Diversified, T6, Prone  Pelvis: Diversified, Sacrum , Side posture    Therapeutic procedures:  Provided patient with online resources for program to help with rehabilitation of TANG after surgery.  Resources included but were not limited to soft tissue mobilization of the abductor muscle group, hip flexor muscle group, stretching for the joint capsule, stretching for internal rotation and flexion of the hip.  Strengthening exercises provided to help with gluteus medius and hamstring musculature    Response to Treatment  Reduction in symptoms as reported by patient    Prognosis: Excellent    1/13/2020 Plan of Care:  2-4 visits of Chiropractic Care including Spinal Adjustments and/or physiotherapy and active rehabilitation, to include exercises in the office and/or at home to meet care plan goals.     Frequency: 2xweek for up to 4 weeks. A reevaluation would be clinically appropriate in 4 visits, to determine progress and further course of care.    POC discussed and patient agreeable to plan of care.      1/13/2020 Goals:      Patient will report improved neck and hip pain by 25%.   Patient will report able to look down without painful limits.   Patient will report able to travel without painful limits.   Patient will demonstrate an improved ability to complete Activities of Daily Living  as shown by a reported 10% reduced score on neck and/or back index.    Patient will demonstrate improved ROM.        INSTRUCTIONS   continue with home exercises as tolerated    Follow-up:  Return to care in 1 week.        Disclaimer: This note consists of symbols derived from keyboarding, dictation and/or voice recognition software. As a result, there may be errors in the script that have gone undetected. Please consider this when interpreting information found in this chart.

## 2020-02-28 ENCOUNTER — OFFICE VISIT (OUTPATIENT)
Dept: OBGYN | Facility: OTHER | Age: 40
End: 2020-02-28
Attending: OBSTETRICS & GYNECOLOGY
Payer: COMMERCIAL

## 2020-02-28 VITALS
WEIGHT: 165.9 LBS | SYSTOLIC BLOOD PRESSURE: 90 MMHG | BODY MASS INDEX: 22.47 KG/M2 | RESPIRATION RATE: 20 BRPM | DIASTOLIC BLOOD PRESSURE: 60 MMHG | HEART RATE: 100 BPM | HEIGHT: 72 IN

## 2020-02-28 DIAGNOSIS — N97.9 FEMALE INFERTILITY, SECONDARY: Primary | ICD-10-CM

## 2020-02-28 LAB — TSH SERPL DL<=0.05 MIU/L-ACNC: 2.01 IU/ML (ref 0.34–5.6)

## 2020-02-28 PROCEDURE — 83520 IMMUNOASSAY QUANT NOS NONAB: CPT | Mod: ZL | Performed by: OBSTETRICS & GYNECOLOGY

## 2020-02-28 PROCEDURE — 36415 COLL VENOUS BLD VENIPUNCTURE: CPT | Mod: ZL | Performed by: OBSTETRICS & GYNECOLOGY

## 2020-02-28 PROCEDURE — 99213 OFFICE O/P EST LOW 20 MIN: CPT | Performed by: OBSTETRICS & GYNECOLOGY

## 2020-02-28 PROCEDURE — 84443 ASSAY THYROID STIM HORMONE: CPT | Mod: ZL | Performed by: OBSTETRICS & GYNECOLOGY

## 2020-02-28 ASSESSMENT — MIFFLIN-ST. JEOR: SCORE: 1539.52

## 2020-02-28 ASSESSMENT — PAIN SCALES - GENERAL: PAINLEVEL: NO PAIN (0)

## 2020-02-28 NOTE — NURSING NOTE
Patient here for fertility consultation.   Have been trying for 7 months.     Medication Reconciliation: complete    Arias Mi LPN  2/28/2020 9:26 AM

## 2020-02-28 NOTE — PROGRESS NOTES
Follow-Up Visit    S: Ms. Ann Lopez is a 39 year old  here for unexplained infertility follow up. She and her  have been actively trying for the past 6-7 months. She is having menses every 27-28 days, lasting 3 days, light. Timing intercourse based on predicted ovulation with her roe. She has not been breastfeeding for the past 7-8 months. She would like to try clomid with IUI again.     O:  BP 90/60 (BP Location: Right arm, Patient Position: Sitting, Cuff Size: Adult Regular)   Pulse 100   Resp 20   Ht 1.829 m (6')   Wt 75.3 kg (165 lb 14.4 oz)   LMP 2020   BMI 22.50 kg/m    Gen: Well-appearing, NAD  Pulm: nonlabored  Psych: appropriate mood and affect    A/P:  Ms. Ann Lopez is a 39 year old  here for unexplained infertility. Will recheck TSH and AMH since it has been over 2 years since last checked. Then will plan for clomid 100mg on days 3-7, ultrasound around day 12, with ovidrel/IUI with mature follicle. She is expecting her period next week- will call when it starts to get her meds ord'd.     Sona Newman MD  OB/GYN  2020 11:18 AM

## 2020-03-01 LAB — MIS SERPL-MCNC: 2.54 NG/ML (ref 0.18–11.71)

## 2020-03-04 ENCOUNTER — TELEPHONE (OUTPATIENT)
Dept: OBGYN | Facility: OTHER | Age: 40
End: 2020-03-04

## 2020-03-04 DIAGNOSIS — N97.9 FEMALE INFERTILITY, SECONDARY: Primary | ICD-10-CM

## 2020-03-04 RX ORDER — CLOMIPHENE CITRATE 50 MG/1
100 TABLET ORAL DAILY
Qty: 10 TABLET | Refills: 0 | Status: SHIPPED | OUTPATIENT
Start: 2020-03-04 | End: 2020-06-01

## 2020-03-04 NOTE — TELEPHONE ENCOUNTER
Patient is reporting that she started her cycle today. Her pharmacy is [facility]. Patient is aware that her Rx will be sent.    Harriett Huerta on 3/4/2020 at 11:49 AM

## 2020-03-04 NOTE — TELEPHONE ENCOUNTER
Patient started cycle 3/4/2020.  Clomid 100 mg on days 3-7, US on day 13, Ovidrel that night if indicated, IUI day 14.  All orders placed and appointments made.     Ann Perez RN on 3/4/2020 at 2:19 PM

## 2020-03-11 ENCOUNTER — HEALTH MAINTENANCE LETTER (OUTPATIENT)
Age: 40
End: 2020-03-11

## 2020-03-16 ENCOUNTER — HOSPITAL ENCOUNTER (OUTPATIENT)
Dept: ULTRASOUND IMAGING | Facility: OTHER | Age: 40
Discharge: HOME | End: 2020-03-16
Attending: OBSTETRICS & GYNECOLOGY | Admitting: OBSTETRICS & GYNECOLOGY
Payer: COMMERCIAL

## 2020-03-16 ENCOUNTER — TELEPHONE (OUTPATIENT)
Dept: OBGYN | Facility: OTHER | Age: 40
End: 2020-03-16

## 2020-03-16 DIAGNOSIS — N97.9 FEMALE INFERTILITY, SECONDARY: ICD-10-CM

## 2020-03-16 DIAGNOSIS — N97.9 FEMALE INFERTILITY, SECONDARY: Primary | ICD-10-CM

## 2020-03-16 PROCEDURE — 76857 US EXAM PELVIC LIMITED: CPT

## 2020-03-16 NOTE — TELEPHONE ENCOUNTER
Spoke with patient and informed her of her US results and Dr Stefan Carrizales MD, FACOG recommendations of returning on 3/18/2020 for a repeat follicle study. Transferred to scheduling. Order placed for repeat follicle study to be done on 3/18/2020. Sunday Sanchez RN, BSN  ....................  3/16/2020   4:18 PM

## 2020-03-18 ENCOUNTER — HOSPITAL ENCOUNTER (OUTPATIENT)
Dept: ULTRASOUND IMAGING | Facility: OTHER | Age: 40
End: 2020-03-18
Attending: OBSTETRICS & GYNECOLOGY
Payer: COMMERCIAL

## 2020-03-18 ENCOUNTER — ALLIED HEALTH/NURSE VISIT (OUTPATIENT)
Dept: OBGYN | Facility: OTHER | Age: 40
End: 2020-03-18
Attending: OBSTETRICS & GYNECOLOGY
Payer: COMMERCIAL

## 2020-03-18 DIAGNOSIS — N97.9 FEMALE INFERTILITY, SECONDARY: ICD-10-CM

## 2020-03-18 DIAGNOSIS — N97.9 FEMALE INFERTILITY, SECONDARY: Primary | ICD-10-CM

## 2020-03-18 PROCEDURE — 96372 THER/PROPH/DIAG INJ SC/IM: CPT

## 2020-03-18 PROCEDURE — 76857 US EXAM PELVIC LIMITED: CPT

## 2020-03-18 PROCEDURE — 25000128 H RX IP 250 OP 636: Performed by: OBSTETRICS & GYNECOLOGY

## 2020-03-18 RX ADMIN — CHORIOGONADOTROPIN ALFA 250 MCG: 250 INJECTION, SOLUTION SUBCUTANEOUS at 16:41

## 2020-03-18 NOTE — PROGRESS NOTES
Verified patient's first and last name, and . Patient stated reason for visit today is to receive Ovidrel. Ovidrel prepared and administered Sub Q into abdomen as ordered. Administration of medication documented in MAR (see MAR for further information regarding dose, lot #, NDC #, expiration date).    Kerry GOODSONN, RN on 3/18/2020 at 4:41 PM

## 2020-03-19 ENCOUNTER — OFFICE VISIT (OUTPATIENT)
Dept: OBGYN | Facility: OTHER | Age: 40
End: 2020-03-19
Attending: OBSTETRICS & GYNECOLOGY
Payer: COMMERCIAL

## 2020-03-19 VITALS
OXYGEN SATURATION: 98 % | RESPIRATION RATE: 16 BRPM | HEART RATE: 69 BPM | DIASTOLIC BLOOD PRESSURE: 72 MMHG | SYSTOLIC BLOOD PRESSURE: 118 MMHG

## 2020-03-19 DIAGNOSIS — N97.9 FEMALE INFERTILITY, UNEXPLAINED: Primary | ICD-10-CM

## 2020-03-19 PROCEDURE — 58322 ARTIFICIAL INSEMINATION: CPT | Performed by: OBSTETRICS & GYNECOLOGY

## 2020-03-19 PROCEDURE — 58323 SPERM WASHING: CPT | Performed by: OBSTETRICS & GYNECOLOGY

## 2020-03-19 ASSESSMENT — PAIN SCALES - GENERAL: PAINLEVEL: NO PAIN (0)

## 2020-03-19 NOTE — PROGRESS NOTES
Follow-Up Visit    S: Ms. Ann Lopez is a 39 year old  here for IUI for unexplained infertility. Ultrasound yesterday demonstrated two mature follicles, one on the right and one on the left.    O:  /72 (BP Location: Left arm, Patient Position: Sitting, Cuff Size: Adult Regular)   Pulse 69   Resp 16   LMP 2020   SpO2 98%   Breastfeeding No   Gen: Well-appearing, NAD  Pulm: nonlabored  Psych: appropriate mood and affect    Pelvic:  Normal appearing external female genitalia. Normal hair distribution. Vagina is without lesions. Small white discharge. Cervix normal, no lesions    Procedure:  Timeout was performed and the semen sample verified with her partner's  Information. A pre- and post-prep semen analysis was done showing a normal count with good motility- post count 125 mil/mL, 73% motility. She was positioned in dorsal lithotomy and a speculum inserted visualizing her cervix.The Tomcat catheter inserted through the cervix into the uterus. The semen sample of 1 ml was delivered into the endometrial cavity.  The cervix was released from the tenaculum and the speculum removed. The patient was kept in supine position for 15 min and then dismissed from the clinic in stable condition. She was instructed to have timed intercourse with her spouse for the next two consecutive days as well.       A/P:  Ms. Ann Lopez is a 39 year old  here for IUI. Instructed to take a pregnancy test if no menses in two weeks and call clinic with results.    Sona Newman MD  OB/GYN  3/19/2020 4:06 PM

## 2020-05-26 ENCOUNTER — TELEPHONE (OUTPATIENT)
Dept: OBGYN | Facility: OTHER | Age: 40
End: 2020-05-26

## 2020-05-26 DIAGNOSIS — Z32.00 POSSIBLE PREGNANCY, NOT YET CONFIRMED: Primary | ICD-10-CM

## 2020-05-26 NOTE — TELEPHONE ENCOUNTER
Patient had positive home UPT. She is unsure of dates, likely 4-8 weeks. Per [facility], lab only for HCG quant. Schedule intake and OBPx based on results. Scheduling to assist with lab appointment.    Nay Pacheco RN...................5/26/2020 9:42 AM

## 2020-05-28 DIAGNOSIS — Z32.00 POSSIBLE PREGNANCY, NOT YET CONFIRMED: ICD-10-CM

## 2020-05-28 LAB — B-HCG SERPL-ACNC: NORMAL IU/L

## 2020-05-28 PROCEDURE — 36415 COLL VENOUS BLD VENIPUNCTURE: CPT | Mod: ZL | Performed by: OBSTETRICS & GYNECOLOGY

## 2020-05-28 PROCEDURE — 84702 CHORIONIC GONADOTROPIN TEST: CPT | Mod: ZL | Performed by: OBSTETRICS & GYNECOLOGY

## 2020-06-02 ENCOUNTER — VIRTUAL VISIT (OUTPATIENT)
Dept: OBGYN | Facility: OTHER | Age: 40
End: 2020-06-02
Attending: OBSTETRICS & GYNECOLOGY
Payer: COMMERCIAL

## 2020-06-02 VITALS — BODY MASS INDEX: 22.97 KG/M2 | HEIGHT: 72 IN | WEIGHT: 169.6 LBS

## 2020-06-02 DIAGNOSIS — O09.529 SUPERVISION OF HIGH-RISK PREGNANCY OF ELDERLY MULTIGRAVIDA: ICD-10-CM

## 2020-06-02 DIAGNOSIS — Z34.81 ENCOUNTER FOR SUPERVISION OF OTHER NORMAL PREGNANCY IN FIRST TRIMESTER: Primary | ICD-10-CM

## 2020-06-02 PROCEDURE — 99207 ZZC OB VISIT-NO CHARGE - GICH ONLY: CPT | Mod: TEL

## 2020-06-02 ASSESSMENT — PAIN SCALES - GENERAL: PAINLEVEL: NO PAIN (0)

## 2020-06-02 ASSESSMENT — PATIENT HEALTH QUESTIONNAIRE - PHQ9: SUM OF ALL RESPONSES TO PHQ QUESTIONS 1-9: 0

## 2020-06-02 ASSESSMENT — MIFFLIN-ST. JEOR: SCORE: 1556.3

## 2020-06-02 NOTE — PROGRESS NOTES
HPI:    This is a 39 year old female patient,  who was called today for OB Intake visit. Patient reports positive pregnancy test at home.     Obstetrical history and OB Demographics updated to the best of this nurse's ability based on patient report. PHQ-9 depression screening and routine Domestic Abuse screening completed. All immediate questions and concerns answered.    Last menstrual period is reported as Patient's last menstrual period was 2020 (exact date). has small vaginal bleeding for 24 hours awhile after this. BRODY based on LMP is 2021  Her cycles are regular.  Her last menstrual period was normal on 2020   Since her LMP, she has experienced  nausea.       Personal OB history includes: G  2 and P  1  Previous OB Provider: Dr. INA Newman   Previous Delivering Clinic: [facility]  Release of Records: N/A    Current delivery plan: [facility] OB Provider: Dr. Rosa Newman   Current Primary Care Provider: ALEXSANDRA   Pediatrician: ALEXSANDRA    Additional History:     Have you travelled during the pregnancy?No  Have your sexual partner(s) travelled during the pregnancy?No      HISTORY:   Planned Pregnancy: Yes  Marital Status:   Occupation: Massage therapist   Living in Household: Spouse and Children    Father of the baby is involved.   Family and father of baby is supportive of current pregnancy.  Past Medical History of Father of Baby:Asthma    Past History:  Her past medical history is non-contributory.      She has a history of  prior  section    Since her last LMP she denies use of alcohol, tobacco and street drugs.    Pap smear history: NO - age 30-65 PAP every 5 years with negative HPV co-testing recommended    STD/STI history: HSV    STD/STI symptoms: no noticeable symptoms     Past medical, surgical, social and family history were reviewed and updated in EPIC.    Medications reviewed by this nurse. Current medication list:  Current Outpatient Medications   Medication Sig  Dispense Refill     Prenatal MV-Min-Fe Fum-FA-DHA (PRENATAL 1 PO) Take 1 tablet by mouth       The following medications were recommended to be discontinued due to Pregnancy Category D status: None  Patient informed to contact her primary care provider as soon as possible to discuss a safer alternative.    Risk factors:  Moderate and moderately severe risks (consult with OB/Gyn)  Previous fetal or  demise: No  History of  delivery: No  History of heart disease Class I: No  Severe anemia, unresponsive to iron therapy: No  Pelvic mass or neoplasm: No  Previous : Yes  Hyper/hypothyroidism: No  History of postpartum hemorrhage requiring transfusion:No  History of Placenta Accreta: No    High Risk (Pregnancy managed by OB/Gyn)  Multiple pregnancy: No  Pre-gestational diabetes: No  Chronic Hypertension: No  Renal Failure: No  Heart disease, class II or greater: No  Rh Isoimmunization: No  Chronic active hepatitis: No  Convulsive disorder, poorly controlled: No  Isoimmune thrombocytopenia: No  Pre-term premature rupture of membranes: No  Lupus or other autoimmune disorder: No  Human Immunodeficiency Virus: No      ASSESSMENT/PLAN:       39 year old , Unknown of pregnancy with BRODY of Not found.    Per standing orders and scope of practice of this nurse, patient will have the following orders placed and completed prior to initial OB visit with the appropriate provider:    --early ultrasound for dating and viability ordered for 6+ weeks gestation based on LMP    --Quantitative Beta HCG and progesterone monitoring if indicated    Counseling given:     - Recommended weight gain for pregnancy: 25-35 lbs.   BMI < 18.5  28-40 lbs   18.5 - 24.9 25-35   25 - 29.9 15-25   > 30  < 15       PLAN/PATIENT INSTRUCTIONS:    Normal exercise.  Normal sexual activity.  Prenatal vitamins.  Anticipated weight gain.    follow-up appointment with Dr. Rosa Newman  for pre- care and take multivitamin or pre-  vitamins    Kerry López RN.................................................. 6/2/2020 9:14 AM

## 2020-06-12 ENCOUNTER — PRENATAL OFFICE VISIT (OUTPATIENT)
Dept: OBGYN | Facility: OTHER | Age: 40
End: 2020-06-12
Attending: OBSTETRICS & GYNECOLOGY
Payer: COMMERCIAL

## 2020-06-12 ENCOUNTER — HOSPITAL ENCOUNTER (OUTPATIENT)
Dept: ULTRASOUND IMAGING | Facility: OTHER | Age: 40
End: 2020-06-12
Attending: OBSTETRICS & GYNECOLOGY
Payer: COMMERCIAL

## 2020-06-12 VITALS
BODY MASS INDEX: 23.5 KG/M2 | HEIGHT: 72 IN | WEIGHT: 173.5 LBS | HEART RATE: 78 BPM | SYSTOLIC BLOOD PRESSURE: 114 MMHG | DIASTOLIC BLOOD PRESSURE: 68 MMHG

## 2020-06-12 DIAGNOSIS — O09.91 SUPERVISION OF HIGH RISK PREGNANCY IN FIRST TRIMESTER: Primary | ICD-10-CM

## 2020-06-12 DIAGNOSIS — Z34.81 ENCOUNTER FOR SUPERVISION OF OTHER NORMAL PREGNANCY IN FIRST TRIMESTER: ICD-10-CM

## 2020-06-12 LAB
ABO + RH BLD: NORMAL
ABO + RH BLD: NORMAL
BLD GP AB SCN SERPL QL: NORMAL
BLOOD BANK CMNT PATIENT-IMP: NORMAL
C TRACH DNA SPEC QL NAA+PROBE: NOT DETECTED
ERYTHROCYTE [DISTWIDTH] IN BLOOD BY AUTOMATED COUNT: 12 % (ref 10–15)
HCT VFR BLD AUTO: 40.3 % (ref 35–47)
HGB BLD-MCNC: 13.2 G/DL (ref 11.7–15.7)
MCH RBC QN AUTO: 33.1 PG (ref 26.5–33)
MCHC RBC AUTO-ENTMCNC: 32.8 G/DL (ref 31.5–36.5)
MCV RBC AUTO: 101 FL (ref 78–100)
N GONORRHOEA DNA SPEC QL NAA+PROBE: NOT DETECTED
PLATELET # BLD AUTO: 165 10E9/L (ref 150–450)
RBC # BLD AUTO: 3.99 10E12/L (ref 3.8–5.2)
SPECIMEN EXP DATE BLD: NORMAL
SPECIMEN SOURCE: NORMAL
WBC # BLD AUTO: 7.4 10E9/L (ref 4–11)

## 2020-06-12 PROCEDURE — 86901 BLOOD TYPING SEROLOGIC RH(D): CPT | Mod: ZL | Performed by: OBSTETRICS & GYNECOLOGY

## 2020-06-12 PROCEDURE — 36415 COLL VENOUS BLD VENIPUNCTURE: CPT | Mod: ZL | Performed by: OBSTETRICS & GYNECOLOGY

## 2020-06-12 PROCEDURE — 99207 ZZC OB VISIT-NO CHARGE - GICH ONLY: CPT | Performed by: OBSTETRICS & GYNECOLOGY

## 2020-06-12 PROCEDURE — 86762 RUBELLA ANTIBODY: CPT | Mod: ZL | Performed by: OBSTETRICS & GYNECOLOGY

## 2020-06-12 PROCEDURE — 87340 HEPATITIS B SURFACE AG IA: CPT | Mod: ZL | Performed by: OBSTETRICS & GYNECOLOGY

## 2020-06-12 PROCEDURE — 87491 CHLMYD TRACH DNA AMP PROBE: CPT | Mod: ZL | Performed by: OBSTETRICS & GYNECOLOGY

## 2020-06-12 PROCEDURE — 86850 RBC ANTIBODY SCREEN: CPT | Mod: ZL | Performed by: OBSTETRICS & GYNECOLOGY

## 2020-06-12 PROCEDURE — 87389 HIV-1 AG W/HIV-1&-2 AB AG IA: CPT | Mod: ZL | Performed by: OBSTETRICS & GYNECOLOGY

## 2020-06-12 PROCEDURE — 76801 OB US < 14 WKS SINGLE FETUS: CPT

## 2020-06-12 PROCEDURE — 86780 TREPONEMA PALLIDUM: CPT | Mod: ZL | Performed by: OBSTETRICS & GYNECOLOGY

## 2020-06-12 PROCEDURE — 87086 URINE CULTURE/COLONY COUNT: CPT | Mod: ZL | Performed by: OBSTETRICS & GYNECOLOGY

## 2020-06-12 PROCEDURE — 85027 COMPLETE CBC AUTOMATED: CPT | Mod: ZL | Performed by: OBSTETRICS & GYNECOLOGY

## 2020-06-12 PROCEDURE — 86900 BLOOD TYPING SEROLOGIC ABO: CPT | Mod: ZL | Performed by: OBSTETRICS & GYNECOLOGY

## 2020-06-12 PROCEDURE — 87591 N.GONORRHOEAE DNA AMP PROB: CPT | Mod: ZL | Performed by: OBSTETRICS & GYNECOLOGY

## 2020-06-12 ASSESSMENT — PAIN SCALES - GENERAL: PAINLEVEL: NO PAIN (0)

## 2020-06-12 ASSESSMENT — MIFFLIN-ST. JEOR: SCORE: 1568.99

## 2020-06-12 NOTE — PROGRESS NOTES
New Obstetrics Visit    HPI: 40 year old  at 8w2d by 8w2d US here today for initial OB visit. Her LMP was 3/30/20 but had spotting for 24 hours about a month later. This was a planned pregnancy. Did not conceive with her IUI cycle but was successful spontaneously a month later. Nausea, no vomiting. Tired.     OBHx  OB History    Para Term  AB Living   2 1 1 0 0 1   SAB TAB Ectopic Multiple Live Births   0 0 0 0 1      # Outcome Date GA Lbr Garcia/2nd Weight Sex Delivery Anes PTL Lv   2 Current            1 Term 18 40w3d  3.589 kg (7 lb 14.6 oz) M CS-LTranv EPI N ORION      Complications: Fetal Intolerance      Name: OH ROSENTHAL      Apgar1: 9  Apgar5: 9     PMHx:   Past Medical History:   Diagnosis Date     Dysmenorrhea     2006     Labral tear of right hip joint      Other herpesviral infection     2004,genitial     Stress fracture     left hip     Vitamin D deficiency     10/5/2016      PSHx:   Past Surgical History:   Procedure Laterality Date      SECTION N/A 2018    Procedure:  SECTION;;  Surgeon: Sona Newman MD;  Location: [facility] OR     [facility] IN [facility]            Meds:   Current Outpatient Medications   Medication     Prenatal MV-Min-Fe Fum-FA-DHA (PRENATAL 1 PO)     No current facility-administered medications for this visit.      Allergies:   No Known Allergies    SocHx:   Social History     Tobacco Use     Smoking status: Never Smoker     Smokeless tobacco: Never Used   Substance Use Topics     Alcohol use: No     Drug use: No     Comment: Drug use: No     Lives with her , Juan, and their son Devin.     FamHx:   Family History   Problem Relation Age of Onset     Family History Negative Mother         Good Health     Alcoholism Father         Alcoholism     Heart Disease Maternal Grandmother         Heart Disease     Emphysema Paternal Grandmother         Emphysema     Leukemia Paternal Grandfather          Leukemia     Thyroid Disease Sister         Thyroid Disease,borderline; infertility; 3 yrs younger     Thyroid Disease Other         Thyroid Disease,father's side of the family        ROS: 10-Point ROS negative except as noted in HPI      Physical Exam  /68 (BP Location: Right arm)   Pulse 78   Ht 1.829 m (6')   Wt 78.7 kg (173 lb 8 oz)   LMP 2020 (Exact Date)   Breastfeeding No   BMI 23.53 kg/m    Body mass index is 23.53 kg/m .  Gen: Well-appearing, NAD  HEENT: Normocephalic, atraumatic  Neck: Thyroid is not enlarged, no appreciable masses palpated. Non-tender  CV:  RRR, no m/r/g auscultated  Pulm: CTAB, no w/r/r auscultated  Abd: Soft, non-tender, non-distended  Ext: No LE edema, extremities warm and well perfused    Breast: Symmetric, no nipple discharge or retraction, no axillary lymphadenopathy, no palpable nodules or masses bilaterally. Dense breast tissue bilaterally    Pelvic:  Normal appearing external female genitalia. No vaginal lesions. Moderate white discharge. Cervix normal, no lesions. Uterus is small, mobile, non-tender, anteverted. No adnexal tenderness or masses    Assessment/Plan:  Ms. Ann Lopez is a 40 year old  at 8w2d by 8w2d US, here for new OB visit. Pregnancy is complicated by advanced maternal age, remote hx of genital HSV.  1. AMA: discussed increased risk of aneuploidy, patient declines screening. Will plan for growth US at 32 weeks, weekly NSTs starting at 36 weeks  2. Hx of  section: plans repeat at 39 weeks  3. Remote hx of HSV: will plan ppx at 36 weeks  4. New OB labs ord'd  5. Genetics: Declines  6. Imaging: dating US at 8w2d  7. Immunizations: none    Follow up in 4 weeks.    Sona Newman MD  OB/GYN  2020 9:56 AM

## 2020-06-13 LAB
RUBV IGG SERPL IA-ACNC: 164 IU/ML
T PALLIDUM AB SER QL: NONREACTIVE

## 2020-06-14 LAB
BACTERIA SPEC CULT: NORMAL
HBV SURFACE AG SERPL QL IA: NONREACTIVE
HIV 1+2 AB+HIV1 P24 AG SERPL QL IA: NONREACTIVE
SPECIMEN SOURCE: NORMAL

## 2020-07-24 ENCOUNTER — PRENATAL OFFICE VISIT (OUTPATIENT)
Dept: OBGYN | Facility: OTHER | Age: 40
End: 2020-07-24
Attending: OBSTETRICS & GYNECOLOGY
Payer: COMMERCIAL

## 2020-07-24 VITALS
HEART RATE: 60 BPM | BODY MASS INDEX: 23.73 KG/M2 | DIASTOLIC BLOOD PRESSURE: 60 MMHG | SYSTOLIC BLOOD PRESSURE: 112 MMHG | WEIGHT: 175 LBS

## 2020-07-24 DIAGNOSIS — O09.92 SUPERVISION OF HIGH RISK PREGNANCY IN SECOND TRIMESTER: Primary | ICD-10-CM

## 2020-07-24 PROCEDURE — 99207 ZZC OB VISIT-NO CHARGE - GICH ONLY: CPT | Performed by: OBSTETRICS & GYNECOLOGY

## 2020-07-24 ASSESSMENT — PAIN SCALES - GENERAL: PAINLEVEL: NO PAIN (0)

## 2020-07-24 NOTE — PROGRESS NOTES
Return OB Visit    S: Patient is feeling well. Energy returning. No cramping or VB    O: /60 (BP Location: Right arm, Patient Position: Sitting, Cuff Size: Adult Regular)   Pulse 60   Wt 79.4 kg (175 lb)   LMP 2020 (Exact Date)   Breastfeeding No   BMI 23.73 kg/m    Gen: Well-appearing, NAD  See OB Flowsheet    A/P:  Ann Lopez is a 40 year old  at 14w2d by 8w2d US, here for return OB visit.  AMA: declines screening. Needs growth US at 32 weeks, weekly NSTs starting at 36 wks  Remote hx of genital HSV  Hx of  section: plans repeat    PNC:  Rh positive, Rubella immune  Genetics: declines  Imaging: dating US at 8w2d  Immunizations: none  RTC 4-5 weeks with MFM scan    Sona Newman MD  OB/GYN  2020 9:33 AM

## 2020-07-24 NOTE — NURSING NOTE
Chief Complaint   Patient presents with     Prenatal Care     14w2d     Offers no complaints   Veronica Aguiar LPN........................7/24/2020  9:29 AM     Medication Reconciliation: completed   Veronica Aguiar LPN  7/24/2020 9:29 AM

## 2020-08-26 ENCOUNTER — HOSPITAL ENCOUNTER (OUTPATIENT)
Dept: ULTRASOUND IMAGING | Facility: CLINIC | Age: 40
End: 2020-08-26
Attending: OBSTETRICS & GYNECOLOGY
Payer: COMMERCIAL

## 2020-08-26 ENCOUNTER — OFFICE VISIT (OUTPATIENT)
Dept: MATERNAL FETAL MEDICINE | Facility: CLINIC | Age: 40
End: 2020-08-26
Attending: OBSTETRICS & GYNECOLOGY
Payer: COMMERCIAL

## 2020-08-26 ENCOUNTER — PRENATAL OFFICE VISIT (OUTPATIENT)
Dept: OBGYN | Facility: OTHER | Age: 40
End: 2020-08-26
Attending: OBSTETRICS & GYNECOLOGY
Payer: COMMERCIAL

## 2020-08-26 ENCOUNTER — HOSPITAL ENCOUNTER (OUTPATIENT)
Dept: ULTRASOUND IMAGING | Facility: OTHER | Age: 40
End: 2020-08-26
Attending: OBSTETRICS & GYNECOLOGY
Payer: COMMERCIAL

## 2020-08-26 VITALS
WEIGHT: 180 LBS | HEART RATE: 64 BPM | SYSTOLIC BLOOD PRESSURE: 122 MMHG | DIASTOLIC BLOOD PRESSURE: 60 MMHG | BODY MASS INDEX: 24.41 KG/M2

## 2020-08-26 DIAGNOSIS — O09.92 SUPERVISION OF HIGH RISK PREGNANCY IN SECOND TRIMESTER: ICD-10-CM

## 2020-08-26 DIAGNOSIS — O09.92 SUPERVISION OF HIGH RISK PREGNANCY IN SECOND TRIMESTER: Primary | ICD-10-CM

## 2020-08-26 DIAGNOSIS — O09.522 MULTIGRAVIDA OF ADVANCED MATERNAL AGE IN SECOND TRIMESTER: Primary | ICD-10-CM

## 2020-08-26 PROCEDURE — 76811 OB US DETAILED SNGL FETUS: CPT

## 2020-08-26 PROCEDURE — 99207 ZZC OB VISIT-NO CHARGE - GICH ONLY: CPT | Performed by: OBSTETRICS & GYNECOLOGY

## 2020-08-26 ASSESSMENT — PAIN SCALES - GENERAL: PAINLEVEL: NO PAIN (0)

## 2020-08-26 NOTE — PROGRESS NOTES
Type of service:    Telemedicine Office Visit for fetal ultrasound    Date of service:     Date: 08/26/20     Time service began:    11:00 AM  Time service ended:    12:00 PM    Reason:      .tel: Patient unable to travel    Description of basis or telemedicine appropriateness:     Consultation provided at the request of Dr. Newman for advice regarding the diagnosis and treatment of this patient's pregnancy.  The patient's condition can be safely assessed via telemedicine.    The Mode of Transmission:    Secure interactive audio and visual telecommunication system (Video Guidance)    Location of originating and distant sites:      Originating site:   [place], [place]    Distant site:    [place], [place]    Nuha Scott DO

## 2020-08-26 NOTE — PROGRESS NOTES
Return OB Visit    S: Patient is feeling well. No cramping or VB. +FM.    O: /60 (BP Location: Right arm, Patient Position: Sitting, Cuff Size: Adult Large)   Pulse 64   Wt 81.6 kg (180 lb)   LMP 2020 (Exact Date)   Breastfeeding No   BMI 24.41 kg/m    Gen: Well-appearing, NAD  See OB Flowsheet    A/P:  Ann Lopez is a 40 year old  at 19w0d by 8w2d US, here for return OB visit.  AMA: declines screening. Needs growth US at 32 weeks, weekly NSTs starting at 36 wks  Remote hx of genital HSV  Hx of  section: plans repeat     PNC:  Rh positive, Rubella immune  Genetics: declines  Imaging: dating US at 8w2d, level 2 US pending from today  Immunizations: none  RTC 4 weeks    Sona Newman MD  OB/GYN  2020 12:49 PM

## 2020-08-26 NOTE — NURSING NOTE
Chief Complaint   Patient presents with     Prenatal Care     19w0d     Offers no complaints   Veronica Aguiar LPN........................8/26/2020  12:49 PM     Medication Reconciliation: completed   Veronica Aguiar LPN  8/26/2020 12:49 PM

## 2020-09-01 DIAGNOSIS — Z01.89 LABORATORY PROCEDURE: Primary | ICD-10-CM

## 2020-09-01 PROCEDURE — 36415 COLL VENOUS BLD VENIPUNCTURE: CPT | Mod: ZL

## 2020-09-23 ENCOUNTER — PRENATAL OFFICE VISIT (OUTPATIENT)
Dept: OBGYN | Facility: OTHER | Age: 40
End: 2020-09-23
Attending: OBSTETRICS & GYNECOLOGY
Payer: COMMERCIAL

## 2020-09-23 VITALS
BODY MASS INDEX: 25.13 KG/M2 | OXYGEN SATURATION: 98 % | DIASTOLIC BLOOD PRESSURE: 68 MMHG | RESPIRATION RATE: 18 BRPM | WEIGHT: 185.3 LBS | SYSTOLIC BLOOD PRESSURE: 110 MMHG | HEART RATE: 85 BPM

## 2020-09-23 DIAGNOSIS — O09.92 SUPERVISION OF HIGH RISK PREGNANCY IN SECOND TRIMESTER: Primary | ICD-10-CM

## 2020-09-23 PROCEDURE — 99207 ZZC OB VISIT-NO CHARGE - GICH ONLY: CPT | Performed by: OBSTETRICS & GYNECOLOGY

## 2020-09-23 ASSESSMENT — PAIN SCALES - GENERAL: PAINLEVEL: NO PAIN (0)

## 2020-09-23 NOTE — NURSING NOTE
Chief Complaint   Patient presents with     Prenatal Care     23w       Initial /68 (BP Location: Right arm, Patient Position: Sitting, Cuff Size: Adult Regular)   Pulse 85   Resp 18   Wt 84.1 kg (185 lb 4.8 oz)   LMP 03/30/2020 (Exact Date)   SpO2 98%   BMI 25.13 kg/m   Estimated body mass index is 25.13 kg/m  as calculated from the following:    Height as of 6/12/20: 1.829 m (6').    Weight as of this encounter: 84.1 kg (185 lb 4.8 oz).  Medication Reconciliation: complete    Sherlyn Alejandro LPN

## 2020-09-23 NOTE — PROGRESS NOTES
Return OB Visit    S: Patient is doing well. No cramping or VB. +FM    O: /68 (BP Location: Right arm, Patient Position: Sitting, Cuff Size: Adult Regular)   Pulse 85   Resp 18   Wt 84.1 kg (185 lb 4.8 oz)   LMP 2020 (Exact Date)   SpO2 98%   BMI 25.13 kg/m    Gen: Well-appearing, NAD  See OB Flowsheet    A/P:  Ann Lopez is a 40 year old  at 23w0d by 8w2d US, here for return OB visit.  AMA: declines screening. Needs growth US at 32 weeks, weekly NSTs starting at 36 wks  Remote hx of genital HSV  Hx of  section: plans repeat     PNC:  Rh positive, Rubella immune  Genetics: declines  Imaging: dating US at 8w2d, level 2 US normal  Immunizations: declines flu, will consider Tdap  RTC 4 weeks- GCT, CBC, syphilis, Tdap next visit    Sona Newman MD  OB/GYN  2020 3:14 PM

## 2020-10-21 ENCOUNTER — PRENATAL OFFICE VISIT (OUTPATIENT)
Dept: OBGYN | Facility: OTHER | Age: 40
End: 2020-10-21
Attending: OBSTETRICS & GYNECOLOGY
Payer: COMMERCIAL

## 2020-10-21 VITALS
BODY MASS INDEX: 25.36 KG/M2 | WEIGHT: 187 LBS | HEART RATE: 68 BPM | DIASTOLIC BLOOD PRESSURE: 70 MMHG | SYSTOLIC BLOOD PRESSURE: 122 MMHG

## 2020-10-21 DIAGNOSIS — O09.93 SUPERVISION OF HIGH RISK PREGNANCY IN THIRD TRIMESTER: Primary | ICD-10-CM

## 2020-10-21 LAB
ERYTHROCYTE [DISTWIDTH] IN BLOOD BY AUTOMATED COUNT: 13.2 % (ref 10–15)
GLUCOSE 1H P 50 G GLC PO SERPL-MCNC: 72 MG/DL (ref 60–129)
HCT VFR BLD AUTO: 37.1 % (ref 35–47)
HGB BLD-MCNC: 12.5 G/DL (ref 11.7–15.7)
MCH RBC QN AUTO: 34.7 PG (ref 26.5–33)
MCHC RBC AUTO-ENTMCNC: 33.7 G/DL (ref 31.5–36.5)
MCV RBC AUTO: 103 FL (ref 78–100)
PLATELET # BLD AUTO: 146 10E9/L (ref 150–450)
RBC # BLD AUTO: 3.6 10E12/L (ref 3.8–5.2)
WBC # BLD AUTO: 8.6 10E9/L (ref 4–11)

## 2020-10-21 PROCEDURE — 36415 COLL VENOUS BLD VENIPUNCTURE: CPT | Mod: ZL | Performed by: OBSTETRICS & GYNECOLOGY

## 2020-10-21 PROCEDURE — 86780 TREPONEMA PALLIDUM: CPT | Mod: ZL | Performed by: OBSTETRICS & GYNECOLOGY

## 2020-10-21 PROCEDURE — 99207 PR OB VISIT-NO CHARGE - GICH ONLY: CPT | Performed by: OBSTETRICS & GYNECOLOGY

## 2020-10-21 PROCEDURE — 85027 COMPLETE CBC AUTOMATED: CPT | Mod: ZL | Performed by: OBSTETRICS & GYNECOLOGY

## 2020-10-21 PROCEDURE — 82950 GLUCOSE TEST: CPT | Mod: ZL | Performed by: OBSTETRICS & GYNECOLOGY

## 2020-10-21 ASSESSMENT — PAIN SCALES - GENERAL: PAINLEVEL: NO PAIN (0)

## 2020-10-21 NOTE — PROGRESS NOTES
Return OB Visit    S: Patient is feeling well. No ctx, VB or LOF. +FM    O: /70 (BP Location: Right arm, Patient Position: Sitting, Cuff Size: Adult Large)   Pulse 68   Wt 84.8 kg (187 lb)   LMP 2020 (Exact Date)   Breastfeeding No   BMI 25.36 kg/m    Gen: Well-appearing, NAD  See OB Flowsheet    A/P:  Ann Lopez is a 40 year old  at 27w0d by 8w2d US, here for return OB visit.  AMA: declines screening. Needs growth US at 32 weeks, weekly NSTs starting at 36 wks  Remote hx of genital HSV  Hx of  section: plans repeat, hoping for , will schedule next visit. Considering sterilization     PNC:  Rh positive, Rubella immune  Genetics: declines  Imaging: dating US at 8w2d, level 2 US normal  Immunizations: declines flu and Tdap  RTC 4 weeks with growth US, c/s scheduling    Sona Newman MD  OB/GYN  10/21/2020 9:04 AM

## 2020-10-21 NOTE — NURSING NOTE
Chief Complaint   Patient presents with     Prenatal Care     27w0d       Offers no complaints  Veronica Aguiar LPN........................10/21/2020  8:39 AM     Medication Reconciliation: completed   Veronica Aguiar LPN  10/21/2020 8:38 AM

## 2020-10-22 LAB — T PALLIDUM AB SER QL: NONREACTIVE

## 2020-11-18 ENCOUNTER — HOSPITAL ENCOUNTER (OUTPATIENT)
Dept: ULTRASOUND IMAGING | Facility: OTHER | Age: 40
End: 2020-11-18
Attending: OBSTETRICS & GYNECOLOGY
Payer: COMMERCIAL

## 2020-11-18 ENCOUNTER — PRENATAL OFFICE VISIT (OUTPATIENT)
Dept: OBGYN | Facility: OTHER | Age: 40
End: 2020-11-18
Attending: OBSTETRICS & GYNECOLOGY
Payer: COMMERCIAL

## 2020-11-18 VITALS
HEART RATE: 72 BPM | BODY MASS INDEX: 25.5 KG/M2 | WEIGHT: 188 LBS | SYSTOLIC BLOOD PRESSURE: 102 MMHG | DIASTOLIC BLOOD PRESSURE: 60 MMHG

## 2020-11-18 DIAGNOSIS — O09.93 SUPERVISION OF HIGH RISK PREGNANCY IN THIRD TRIMESTER: ICD-10-CM

## 2020-11-18 DIAGNOSIS — O34.219 HISTORY OF CESAREAN DELIVERY, CURRENTLY PREGNANT: ICD-10-CM

## 2020-11-18 DIAGNOSIS — Z01.812 PRE-PROCEDURE LAB EXAM: ICD-10-CM

## 2020-11-18 DIAGNOSIS — O09.93 SUPERVISION OF HIGH RISK PREGNANCY IN THIRD TRIMESTER: Primary | ICD-10-CM

## 2020-11-18 PROCEDURE — 99207 PR OB VISIT-NO CHARGE - GICH ONLY: CPT | Performed by: OBSTETRICS & GYNECOLOGY

## 2020-11-18 PROCEDURE — 76816 OB US FOLLOW-UP PER FETUS: CPT

## 2020-11-18 RX ORDER — SODIUM CHLORIDE, SODIUM LACTATE, POTASSIUM CHLORIDE, CALCIUM CHLORIDE 600; 310; 30; 20 MG/100ML; MG/100ML; MG/100ML; MG/100ML
INJECTION, SOLUTION INTRAVENOUS CONTINUOUS
Status: CANCELLED | OUTPATIENT
Start: 2020-11-18

## 2020-11-18 RX ORDER — CEFAZOLIN SODIUM 2 G/100ML
2 INJECTION, SOLUTION INTRAVENOUS
Status: CANCELLED | OUTPATIENT
Start: 2020-11-18

## 2020-11-18 RX ORDER — CEFAZOLIN SODIUM 1 G/50ML
1 INJECTION, SOLUTION INTRAVENOUS SEE ADMIN INSTRUCTIONS
Status: CANCELLED | OUTPATIENT
Start: 2020-11-18

## 2020-11-18 RX ORDER — CITRIC ACID/SODIUM CITRATE 334-500MG
30 SOLUTION, ORAL ORAL
Status: CANCELLED | OUTPATIENT
Start: 2020-11-18

## 2020-11-18 ASSESSMENT — PAIN SCALES - GENERAL: PAINLEVEL: NO PAIN (0)

## 2020-11-18 NOTE — NURSING NOTE
"Date of Surgery: 21  Type of Surgery: Repeat  section, Bilateral Salpingectomy   Surgeon: Dr. Sona Newman MD     Patient's consents were signed and appropriate appointments were scheduled by the [facility] . Patient was given surgical folder which includes pre-operative bathing instructions related to the two packets of Hibiclens surgical prep provided. Surgical forms were copied and kept for informative purposes. Originals were delivered to Day-surgery. Patient denies questions at this time.        STOP BANG    Fever/Chills or other infectious symptoms in past month? no  >10 pound weight loss in the past 2 months? no  Health Care Directive on file? no  History of blood transfusions? no  Td up to date? yes  History of VRE/MRSA? no      Obstructive Sleep Apnea screening    Preoperative Evaluation: Obstructive Sleep Apnea screening    S: Snore -  Do you snore loudly? (louder than talking or loud enough to be heard through closed doors)yes  T: Tired - Do you often feel tired, fatigued, or sleepy during the daytime?no  O: Observed - Has anyone ever observed you stop breathing during your sleep?no  P: Pressure - Do you have or are you being treated for high blood pressure?no  B: BMI - BMI greater than 35kg/m2?no  A: Age - Age over 50 years old?no  N: Neck - Neck circumference greater than 40 cm?no  G: Gender - Gender: Male?no    Total number of \"YES\" responses:  1    Scoring: Low risk of MATT 0-2  At Risk of MATT: >3 High Risk of MATT: 5-8      Total yes answers in MATT section:    Low risk 0-2  At risk 3-4  High risk 5-8    Nay Pacheco RN............. 2020 11:11 AM     "

## 2020-11-18 NOTE — PROGRESS NOTES
Return OB Visit    S: Patient is feeling well. No ctx, VB or LOF. +FM    O: /60 (BP Location: Right arm, Patient Position: Sitting, Cuff Size: Adult Large)   Pulse 72   Wt 85.3 kg (188 lb)   LMP 2020 (Exact Date)   Breastfeeding No   BMI 25.50 kg/m    Gen: Well-appearing, NAD  See OB Flowsheet    A/P:  Ann Lopez is a 40 year old  at 31w0d by 8w2d US, here for return OB visit.  AMA: declines screening. Growth 2020 1733g (60%ile), needs weekly NSTs starting at 36 wks  Remote hx of genital HSV: plan ppx at 36 weeks  Hx of  section, desires repeat with sterilization: scheduled for 20     PNC:  Rh positive, Rubella immune  Genetics: declines  Imaging: dating US at 8w2d, level 2 US normal  Immunizations: declines flu and Tdap  RTC 2 weeks    Sona Newman MD  OB/GYN  2020 10:49 AM

## 2020-11-18 NOTE — NURSING NOTE
Chief Complaint   Patient presents with     Prenatal Care     31w0d     Offers no complaints  Veronica Aguiar LPN........................11/18/2020  10:31 AM       Medication Reconciliation: completed   Veronica Aguiar LPN  11/18/2020 10:31 AM

## 2020-12-02 ENCOUNTER — PRENATAL OFFICE VISIT (OUTPATIENT)
Dept: OBGYN | Facility: OTHER | Age: 40
End: 2020-12-02
Attending: OBSTETRICS & GYNECOLOGY
Payer: COMMERCIAL

## 2020-12-02 VITALS
WEIGHT: 188 LBS | DIASTOLIC BLOOD PRESSURE: 68 MMHG | BODY MASS INDEX: 25.5 KG/M2 | HEART RATE: 76 BPM | SYSTOLIC BLOOD PRESSURE: 110 MMHG

## 2020-12-02 DIAGNOSIS — O09.93 SUPERVISION OF HIGH RISK PREGNANCY IN THIRD TRIMESTER: Primary | ICD-10-CM

## 2020-12-02 PROCEDURE — 99207 PR OB VISIT-NO CHARGE - GICH ONLY: CPT | Performed by: OBSTETRICS & GYNECOLOGY

## 2020-12-02 ASSESSMENT — PAIN SCALES - GENERAL: PAINLEVEL: NO PAIN (0)

## 2020-12-02 NOTE — PROGRESS NOTES
Return OB Visit    S: Patient is feeling well. No ctx, VB or LOF. +FM    O: /68 (BP Location: Right arm, Patient Position: Sitting, Cuff Size: Adult Large)   Pulse 76   Wt 85.3 kg (188 lb)   LMP 2020 (Exact Date)   Breastfeeding No   BMI 25.50 kg/m    Gen: Well-appearing, NAD  See OB Flowsheet    A/P:  Ann Lopez is a 40 year old  at 33w0d by 8w2d US, here for return OB visit.  AMA: declines screening. Growth 2020 1733g (60%ile), needs weekly NSTs starting at 36 wks  Remote hx of genital HSV: plan ppx at 36 weeks  Hx of  section, desires repeat with sterilization: scheduled for 20     PNC:  Rh positive, Rubella immune  Genetics: declines  Imaging: dating US at 8w2d, level 2 US normal  Immunizations: declines flu and Tdap  RTC 2 weeks    Sona Newman MD  OB/GYN  2020 9:53 AM

## 2020-12-02 NOTE — NURSING NOTE
Chief Complaint   Patient presents with     Prenatal Care     33w0d     Offers no complaints    Medication Reconciliation: completed   Veronica Aguiar LPN  12/2/2020 9:49 AM

## 2020-12-16 ENCOUNTER — PRENATAL OFFICE VISIT (OUTPATIENT)
Dept: OBGYN | Facility: OTHER | Age: 40
End: 2020-12-16
Attending: OBSTETRICS & GYNECOLOGY
Payer: COMMERCIAL

## 2020-12-16 VITALS
HEART RATE: 68 BPM | SYSTOLIC BLOOD PRESSURE: 110 MMHG | WEIGHT: 189.8 LBS | DIASTOLIC BLOOD PRESSURE: 74 MMHG | RESPIRATION RATE: 12 BRPM | BODY MASS INDEX: 25.74 KG/M2

## 2020-12-16 DIAGNOSIS — R76.8 HSV-2 SEROPOSITIVE: Primary | ICD-10-CM

## 2020-12-16 DIAGNOSIS — Z3A.35 35 WEEKS GESTATION OF PREGNANCY: ICD-10-CM

## 2020-12-16 PROCEDURE — 99207 PR OB VISIT-NO CHARGE - GICH ONLY: CPT | Performed by: OBSTETRICS & GYNECOLOGY

## 2020-12-16 RX ORDER — VALACYCLOVIR HYDROCHLORIDE 500 MG/1
500 TABLET, FILM COATED ORAL 2 TIMES DAILY
Qty: 60 TABLET | Refills: 0 | Status: SHIPPED | OUTPATIENT
Start: 2020-12-16 | End: 2022-03-13

## 2020-12-16 ASSESSMENT — PAIN SCALES - GENERAL: PAINLEVEL: NO PAIN (0)

## 2020-12-16 NOTE — NURSING NOTE
Chief Complaint   Patient presents with     Prenatal Care     35w       Initial /74 (BP Location: Right arm, Patient Position: Sitting, Cuff Size: Adult Regular)   Pulse 68   Resp 12   Wt 86.1 kg (189 lb 12.8 oz)   LMP 03/30/2020 (Exact Date)   Breastfeeding No   BMI 25.74 kg/m   Estimated body mass index is 25.74 kg/m  as calculated from the following:    Height as of 6/12/20: 1.829 m (6').    Weight as of this encounter: 86.1 kg (189 lb 12.8 oz).  Medication Reconciliation: Completed     Kd Hightower LPN

## 2020-12-16 NOTE — PROGRESS NOTES
Doing well  Infant very active  Scheduled for R C/S on 1/14  Prescription for valtrex sent in - to start next week  One week with NST due to AMA

## 2020-12-23 ENCOUNTER — PRENATAL OFFICE VISIT (OUTPATIENT)
Dept: OBGYN | Facility: OTHER | Age: 40
End: 2020-12-23
Attending: OBSTETRICS & GYNECOLOGY
Payer: COMMERCIAL

## 2020-12-23 VITALS
DIASTOLIC BLOOD PRESSURE: 60 MMHG | SYSTOLIC BLOOD PRESSURE: 108 MMHG | HEART RATE: 76 BPM | BODY MASS INDEX: 25.77 KG/M2 | WEIGHT: 190 LBS

## 2020-12-23 DIAGNOSIS — O09.93 SUPERVISION OF HIGH RISK PREGNANCY IN THIRD TRIMESTER: Primary | ICD-10-CM

## 2020-12-23 LAB
ERYTHROCYTE [DISTWIDTH] IN BLOOD BY AUTOMATED COUNT: 13.2 % (ref 10–15)
HCT VFR BLD AUTO: 39 % (ref 35–47)
HGB BLD-MCNC: 13.1 G/DL (ref 11.7–15.7)
MCH RBC QN AUTO: 34.6 PG (ref 26.5–33)
MCHC RBC AUTO-ENTMCNC: 33.6 G/DL (ref 31.5–36.5)
MCV RBC AUTO: 103 FL (ref 78–100)
PLATELET # BLD AUTO: 119 10E9/L (ref 150–450)
RBC # BLD AUTO: 3.79 10E12/L (ref 3.8–5.2)
WBC # BLD AUTO: 9.7 10E9/L (ref 4–11)

## 2020-12-23 PROCEDURE — 36415 COLL VENOUS BLD VENIPUNCTURE: CPT | Mod: ZL | Performed by: OBSTETRICS & GYNECOLOGY

## 2020-12-23 PROCEDURE — 99207 PR OB VISIT-NO CHARGE - GICH ONLY: CPT | Performed by: OBSTETRICS & GYNECOLOGY

## 2020-12-23 PROCEDURE — 59025 FETAL NON-STRESS TEST: CPT | Performed by: OBSTETRICS & GYNECOLOGY

## 2020-12-23 PROCEDURE — 87081 CULTURE SCREEN ONLY: CPT | Mod: ZL | Performed by: OBSTETRICS & GYNECOLOGY

## 2020-12-23 PROCEDURE — 85027 COMPLETE CBC AUTOMATED: CPT | Mod: ZL | Performed by: OBSTETRICS & GYNECOLOGY

## 2020-12-23 ASSESSMENT — PAIN SCALES - GENERAL: PAINLEVEL: NO PAIN (0)

## 2020-12-23 NOTE — PROGRESS NOTES
Return OB Visit    S: Patient is well, just ready to be done. No ctx, VB or LOF. +FM    O: /60 (BP Location: Right arm, Patient Position: Sitting, Cuff Size: Adult Large)   Pulse 76   Wt 86.2 kg (190 lb)   LMP 2020 (Exact Date)   Breastfeeding No   BMI 25.77 kg/m    Gen: Well-appearing, NAD  See OB Flowsheet    NST: 120, mod variability, + accels, no decels  [place]: quiet    A/P:  Ann Lopez is a 40 year old  at 36w0d by 8w2d US, here for return OB visit.  AMA: declines screening. Growth 2020 1733g (60%ile),weekly NSTs starting at 36 wks  Remote hx of genital HSV: valtrex for ppx  Hx of  section, desires repeat with sterilization: scheduled for 20  Gestational thrombocytopenia: repeat plts today     PNC:  Rh positive, Rubella immune, GCT 72  Genetics: declines  Imaging: dating US at 8w2d, level 2 US normal  Immunizations: declines flu and Tdap  RTC weekly with NSTs    Sona Newman MD  OB/GYN  2020 9:51 AM

## 2020-12-23 NOTE — NURSING NOTE
Chief Complaint   Patient presents with     Prenatal Care     36w0d with NST        Medication Reconciliation: completed   Veronica Aguiar LPN  12/23/2020 9:18 AM

## 2020-12-25 LAB
BACTERIA SPEC CULT: NORMAL
SPECIMEN SOURCE: NORMAL

## 2020-12-30 ENCOUNTER — PRENATAL OFFICE VISIT (OUTPATIENT)
Dept: OBGYN | Facility: OTHER | Age: 40
End: 2020-12-30
Attending: OBSTETRICS & GYNECOLOGY
Payer: COMMERCIAL

## 2020-12-30 VITALS
HEART RATE: 72 BPM | WEIGHT: 192 LBS | SYSTOLIC BLOOD PRESSURE: 108 MMHG | DIASTOLIC BLOOD PRESSURE: 70 MMHG | BODY MASS INDEX: 26.04 KG/M2

## 2020-12-30 DIAGNOSIS — O09.93 SUPERVISION OF HIGH RISK PREGNANCY IN THIRD TRIMESTER: Primary | ICD-10-CM

## 2020-12-30 PROCEDURE — 59025 FETAL NON-STRESS TEST: CPT | Performed by: OBSTETRICS & GYNECOLOGY

## 2020-12-30 PROCEDURE — 99207 PR OB VISIT-NO CHARGE - GICH ONLY: CPT | Performed by: OBSTETRICS & GYNECOLOGY

## 2020-12-30 ASSESSMENT — PAIN SCALES - GENERAL: PAINLEVEL: NO PAIN (0)

## 2020-12-30 NOTE — NURSING NOTE
Chief Complaint   Patient presents with     Prenatal Care     37w0d     Offers no complaints  Veronica Aguiar LPN........................12/30/2020  12:51 PM   Medication Reconciliation: completed   Veronica gAuiar LPN  12/30/2020 12:51 PM

## 2020-12-30 NOTE — PROGRESS NOTES
Return OB Visit    S: Patient is feeling well, getting ready to be done. No ctx, VB or LOF. +FM    O: /70 (BP Location: Right arm, Patient Position: Sitting, Cuff Size: Adult Large)   Pulse 72   Wt 87.1 kg (192 lb)   LMP 2020 (Exact Date)   Breastfeeding No   BMI 26.04 kg/m    Gen: Well-appearing, NAD  See OB Flowsheet    NST: 130, mod variability, + accels, no decels  [place]: quiet    A/P:  Ann Lopez is a 40 year old  at 37w0d by 8w2d US, here for return OB visit.  AMA: declines screening. Growth 2020 1733g (60%ile),weekly NSTs starting at 36 wks  Remote hx of genital HSV: valtrex for ppx  Hx of  section, desires repeat with sterilization: scheduled for 20  Gestational thrombocytopenia: Plts  were 119     PNC:  Rh positive, Rubella immune, GCT 72, GBS negative  Genetics: declines  Imaging: dating US at 8w2d, level 2 US normal  Immunizations: declines flu and Tdap  RTC weekly with NSTs    Sona Newman MD  OB/GYN  2020 12:54 PM

## 2021-01-03 ENCOUNTER — HEALTH MAINTENANCE LETTER (OUTPATIENT)
Age: 41
End: 2021-01-03

## 2021-01-06 ENCOUNTER — PRENATAL OFFICE VISIT (OUTPATIENT)
Dept: OBGYN | Facility: OTHER | Age: 41
End: 2021-01-06
Attending: OBSTETRICS & GYNECOLOGY
Payer: COMMERCIAL

## 2021-01-06 VITALS
BODY MASS INDEX: 26.04 KG/M2 | DIASTOLIC BLOOD PRESSURE: 60 MMHG | HEART RATE: 60 BPM | WEIGHT: 192 LBS | SYSTOLIC BLOOD PRESSURE: 108 MMHG

## 2021-01-06 DIAGNOSIS — O09.93 SUPERVISION OF HIGH RISK PREGNANCY IN THIRD TRIMESTER: Primary | ICD-10-CM

## 2021-01-06 PROCEDURE — 99207 PR OB VISIT-NO CHARGE - GICH ONLY: CPT | Performed by: OBSTETRICS & GYNECOLOGY

## 2021-01-06 RX ORDER — BREAST PUMP
1 EACH MISCELLANEOUS DAILY
Qty: 1 EACH | Refills: 0 | Status: SHIPPED | OUTPATIENT
Start: 2021-01-06 | End: 2022-02-03

## 2021-01-06 ASSESSMENT — PAIN SCALES - GENERAL: PAINLEVEL: NO PAIN (0)

## 2021-01-06 NOTE — PROGRESS NOTES
Return OB Visit    S: Patient is feeling well. No ctx, VB or LOF. +FM    O: /60 (BP Location: Right arm, Patient Position: Sitting, Cuff Size: Adult Large)   Pulse 60   Wt 87.1 kg (192 lb)   LMP 2020 (Exact Date)   Breastfeeding No   BMI 26.04 kg/m    Gen: Well-appearing, NAD  See OB Flowsheet    NST: 125, mod variability, + accels, no decels  Agency: quiet    A/P:  Ann Lopez is a 40 year old  at 38w0d by 8w2d US, here for return OB visit.  AMA: declines screening. Growth 2020 1733g (60%ile),weekly NSTs starting at 36 wks  Remote hx of genital HSV: valtrex for ppx  Hx of  section, desires repeat with sterilization: scheduled for 20  Gestational thrombocytopenia: Plts  were 119     PNC:  Rh positive, Rubella immune, GCT 72, GBS negative  Genetics: declines  Imaging: dating US at 8w2d, level 2 US normal  Immunizations: declines flu and Tdap  RTC weekly with NSTs    Sona Newman MD  OB/GYN  2021 9:03 AM

## 2021-01-06 NOTE — NURSING NOTE
Chief Complaint   Patient presents with     Prenatal Care     38W0D     Offers no complaints   Veronica Aguiar LPN........................1/6/2021  8:17 AM     Medication Reconciliation: completed   Veronica Aguiar LPN  1/6/2021 8:16 AM

## 2021-01-10 ENCOUNTER — ALLIED HEALTH/NURSE VISIT (OUTPATIENT)
Dept: FAMILY MEDICINE | Facility: OTHER | Age: 41
End: 2021-01-10
Attending: OBSTETRICS & GYNECOLOGY
Payer: COMMERCIAL

## 2021-01-10 DIAGNOSIS — Z01.812 PRE-PROCEDURE LAB EXAM: ICD-10-CM

## 2021-01-10 LAB
SARS-COV-2 RNA RESP QL NAA+PROBE: NORMAL
SPECIMEN SOURCE: NORMAL

## 2021-01-10 PROCEDURE — U0005 INFEC AGEN DETEC AMPLI PROBE: HCPCS | Mod: ZL | Performed by: OBSTETRICS & GYNECOLOGY

## 2021-01-10 PROCEDURE — C9803 HOPD COVID-19 SPEC COLLECT: HCPCS

## 2021-01-10 PROCEDURE — U0003 INFECTIOUS AGENT DETECTION BY NUCLEIC ACID (DNA OR RNA); SEVERE ACUTE RESPIRATORY SYNDROME CORONAVIRUS 2 (SARS-COV-2) (CORONAVIRUS DISEASE [COVID-19]), AMPLIFIED PROBE TECHNIQUE, MAKING USE OF HIGH THROUGHPUT TECHNOLOGIES AS DESCRIBED BY CMS-2020-01-R: HCPCS | Mod: ZL | Performed by: OBSTETRICS & GYNECOLOGY

## 2021-01-11 ENCOUNTER — TELEPHONE (OUTPATIENT)
Dept: OBGYN | Facility: OTHER | Age: 41
End: 2021-01-11

## 2021-01-11 LAB
LABORATORY COMMENT REPORT: NORMAL
SARS-COV-2 RNA RESP QL NAA+PROBE: NEGATIVE
SPECIMEN SOURCE: NORMAL

## 2021-01-11 RX ORDER — BREAST PUMP
1 EACH MISCELLANEOUS PRN
Qty: 1 EACH | Refills: 0 | Status: SHIPPED | OUTPATIENT
Start: 2021-01-11 | End: 2022-02-03

## 2021-01-11 NOTE — TELEPHONE ENCOUNTER
Fax received for breast pump. Rx sent per standing order.     Kerry López RN on 1/11/2021 at 3:39 PM

## 2021-01-13 ENCOUNTER — ANESTHESIA EVENT (OUTPATIENT)
Dept: SURGERY | Facility: OTHER | Age: 41
End: 2021-01-13
Payer: COMMERCIAL

## 2021-01-14 ENCOUNTER — HOSPITAL ENCOUNTER (INPATIENT)
Facility: OTHER | Age: 41
LOS: 2 days | Discharge: HOME | End: 2021-01-16
Attending: OBSTETRICS & GYNECOLOGY | Admitting: OBSTETRICS & GYNECOLOGY
Payer: COMMERCIAL

## 2021-01-14 ENCOUNTER — ANESTHESIA (OUTPATIENT)
Dept: SURGERY | Facility: OTHER | Age: 41
End: 2021-01-14
Payer: COMMERCIAL

## 2021-01-14 DIAGNOSIS — O34.219 HISTORY OF CESAREAN DELIVERY, CURRENTLY PREGNANT: ICD-10-CM

## 2021-01-14 DIAGNOSIS — O09.93 SUPERVISION OF HIGH RISK PREGNANCY IN THIRD TRIMESTER: ICD-10-CM

## 2021-01-14 DIAGNOSIS — Z98.891 S/P CESAREAN SECTION: Primary | ICD-10-CM

## 2021-01-14 LAB
ABO + RH BLD: NORMAL
ABO + RH BLD: NORMAL
BASOPHILS # BLD AUTO: 0 10E9/L (ref 0–0.2)
BASOPHILS NFR BLD AUTO: 0.5 %
BLD GP AB SCN SERPL QL: NORMAL
BLOOD BANK CMNT PATIENT-IMP: NORMAL
DIFFERENTIAL METHOD BLD: ABNORMAL
EOSINOPHIL # BLD AUTO: 0.1 10E9/L (ref 0–0.7)
EOSINOPHIL NFR BLD AUTO: 1.7 %
ERYTHROCYTE [DISTWIDTH] IN BLOOD BY AUTOMATED COUNT: 13.2 % (ref 10–15)
HCT VFR BLD AUTO: 34 % (ref 35–47)
HGB BLD-MCNC: 11.6 G/DL (ref 11.7–15.7)
IMM GRANULOCYTES # BLD: 0.1 10E9/L (ref 0–0.4)
IMM GRANULOCYTES NFR BLD: 1.9 %
LYMPHOCYTES # BLD AUTO: 1.9 10E9/L (ref 0.8–5.3)
LYMPHOCYTES NFR BLD AUTO: 31.5 %
MCH RBC QN AUTO: 34.7 PG (ref 26.5–33)
MCHC RBC AUTO-ENTMCNC: 34.1 G/DL (ref 31.5–36.5)
MCV RBC AUTO: 102 FL (ref 78–100)
MONOCYTES # BLD AUTO: 0.5 10E9/L (ref 0–1.3)
MONOCYTES NFR BLD AUTO: 7.8 %
NEUTROPHILS # BLD AUTO: 3.4 10E9/L (ref 1.6–8.3)
NEUTROPHILS NFR BLD AUTO: 56.6 %
PLATELET # BLD AUTO: 118 10E9/L (ref 150–450)
RBC # BLD AUTO: 3.34 10E12/L (ref 3.8–5.2)
SPECIMEN EXP DATE BLD: NORMAL
T PALLIDUM AB SER QL: NONREACTIVE
WBC # BLD AUTO: 5.9 10E9/L (ref 4–11)

## 2021-01-14 PROCEDURE — 0UB70ZZ EXCISION OF BILATERAL FALLOPIAN TUBES, OPEN APPROACH: ICD-10-PCS | Performed by: OBSTETRICS & GYNECOLOGY

## 2021-01-14 PROCEDURE — 85025 COMPLETE CBC W/AUTO DIFF WBC: CPT | Performed by: OBSTETRICS & GYNECOLOGY

## 2021-01-14 PROCEDURE — 360N000076 HC SURGERY LEVEL 3, PER MIN: Performed by: OBSTETRICS & GYNECOLOGY

## 2021-01-14 PROCEDURE — 36415 COLL VENOUS BLD VENIPUNCTURE: CPT | Performed by: OBSTETRICS & GYNECOLOGY

## 2021-01-14 PROCEDURE — 250N000013 HC RX MED GY IP 250 OP 250 PS 637: Performed by: OBSTETRICS & GYNECOLOGY

## 2021-01-14 PROCEDURE — 710N000010 HC RECOVERY PHASE 1, LEVEL 2, PER MIN: Performed by: OBSTETRICS & GYNECOLOGY

## 2021-01-14 PROCEDURE — 250N000011 HC RX IP 250 OP 636: Performed by: OBSTETRICS & GYNECOLOGY

## 2021-01-14 PROCEDURE — 250N000011 HC RX IP 250 OP 636: Performed by: NURSE ANESTHETIST, CERTIFIED REGISTERED

## 2021-01-14 PROCEDURE — 272N000001 HC OR GENERAL SUPPLY STERILE: Performed by: OBSTETRICS & GYNECOLOGY

## 2021-01-14 PROCEDURE — 59510 CESAREAN DELIVERY: CPT | Performed by: OBSTETRICS & GYNECOLOGY

## 2021-01-14 PROCEDURE — 59510 CESAREAN DELIVERY: CPT | Performed by: NURSE ANESTHETIST, CERTIFIED REGISTERED

## 2021-01-14 PROCEDURE — 86900 BLOOD TYPING SEROLOGIC ABO: CPT | Performed by: OBSTETRICS & GYNECOLOGY

## 2021-01-14 PROCEDURE — 250N000013 HC RX MED GY IP 250 OP 250 PS 637: Performed by: NURSE ANESTHETIST, CERTIFIED REGISTERED

## 2021-01-14 PROCEDURE — 250N000009 HC RX 250: Performed by: NURSE ANESTHETIST, CERTIFIED REGISTERED

## 2021-01-14 PROCEDURE — 86850 RBC ANTIBODY SCREEN: CPT | Performed by: OBSTETRICS & GYNECOLOGY

## 2021-01-14 PROCEDURE — 258N000003 HC RX IP 258 OP 636: Performed by: OBSTETRICS & GYNECOLOGY

## 2021-01-14 PROCEDURE — 370N000017 HC ANESTHESIA TECHNICAL FEE, PER MIN: Performed by: OBSTETRICS & GYNECOLOGY

## 2021-01-14 PROCEDURE — 120N000001 HC R&B MED SURG/OB

## 2021-01-14 PROCEDURE — 0HB7XZZ EXCISION OF ABDOMEN SKIN, EXTERNAL APPROACH: ICD-10-PCS | Performed by: OBSTETRICS & GYNECOLOGY

## 2021-01-14 PROCEDURE — 258N000003 HC RX IP 258 OP 636: Performed by: NURSE ANESTHETIST, CERTIFIED REGISTERED

## 2021-01-14 PROCEDURE — 58611 LIGATE OVIDUCT(S) ADD-ON: CPT | Performed by: OBSTETRICS & GYNECOLOGY

## 2021-01-14 PROCEDURE — 86901 BLOOD TYPING SEROLOGIC RH(D): CPT | Performed by: OBSTETRICS & GYNECOLOGY

## 2021-01-14 PROCEDURE — 88302 TISSUE EXAM BY PATHOLOGIST: CPT

## 2021-01-14 PROCEDURE — 250N000009 HC RX 250: Performed by: OBSTETRICS & GYNECOLOGY

## 2021-01-14 PROCEDURE — 86780 TREPONEMA PALLIDUM: CPT | Performed by: OBSTETRICS & GYNECOLOGY

## 2021-01-14 RX ORDER — MORPHINE SULFATE 0.5 MG/ML
INJECTION, SOLUTION EPIDURAL; INTRATHECAL; INTRAVENOUS PRN
Status: DISCONTINUED | OUTPATIENT
Start: 2021-01-14 | End: 2021-01-14

## 2021-01-14 RX ORDER — MAGNESIUM HYDROXIDE 1200 MG/15ML
LIQUID ORAL PRN
Status: DISCONTINUED | OUTPATIENT
Start: 2021-01-14 | End: 2021-01-14 | Stop reason: HOSPADM

## 2021-01-14 RX ORDER — OXYTOCIN 10 [USP'U]/ML
10 INJECTION, SOLUTION INTRAMUSCULAR; INTRAVENOUS
Status: DISCONTINUED | OUTPATIENT
Start: 2021-01-14 | End: 2021-01-16 | Stop reason: HOSPADM

## 2021-01-14 RX ORDER — AMOXICILLIN 250 MG
2 CAPSULE ORAL 2 TIMES DAILY
Status: DISCONTINUED | OUTPATIENT
Start: 2021-01-14 | End: 2021-01-16 | Stop reason: HOSPADM

## 2021-01-14 RX ORDER — MEPERIDINE HYDROCHLORIDE 50 MG/ML
12.5 INJECTION INTRAMUSCULAR; INTRAVENOUS; SUBCUTANEOUS
Status: DISCONTINUED | OUTPATIENT
Start: 2021-01-14 | End: 2021-01-14 | Stop reason: HOSPADM

## 2021-01-14 RX ORDER — KETOROLAC TROMETHAMINE 30 MG/ML
INJECTION, SOLUTION INTRAMUSCULAR; INTRAVENOUS PRN
Status: DISCONTINUED | OUTPATIENT
Start: 2021-01-14 | End: 2021-01-14

## 2021-01-14 RX ORDER — NALOXONE HYDROCHLORIDE 0.4 MG/ML
0.4 INJECTION, SOLUTION INTRAMUSCULAR; INTRAVENOUS; SUBCUTANEOUS
Status: DISCONTINUED | OUTPATIENT
Start: 2021-01-14 | End: 2021-01-16 | Stop reason: HOSPADM

## 2021-01-14 RX ORDER — SIMETHICONE 80 MG
80 TABLET,CHEWABLE ORAL 4 TIMES DAILY PRN
Status: DISCONTINUED | OUTPATIENT
Start: 2021-01-14 | End: 2021-01-16 | Stop reason: HOSPADM

## 2021-01-14 RX ORDER — OXYCODONE HYDROCHLORIDE 5 MG/1
5 TABLET ORAL EVERY 4 HOURS PRN
Status: DISCONTINUED | OUTPATIENT
Start: 2021-01-14 | End: 2021-01-16 | Stop reason: HOSPADM

## 2021-01-14 RX ORDER — ACETAMINOPHEN 10 MG/ML
INJECTION, SOLUTION INTRAVENOUS PRN
Status: DISCONTINUED | OUTPATIENT
Start: 2021-01-14 | End: 2021-01-14

## 2021-01-14 RX ORDER — DEXAMETHASONE SODIUM PHOSPHATE 4 MG/ML
INJECTION, SOLUTION INTRA-ARTICULAR; INTRALESIONAL; INTRAMUSCULAR; INTRAVENOUS; SOFT TISSUE PRN
Status: DISCONTINUED | OUTPATIENT
Start: 2021-01-14 | End: 2021-01-14

## 2021-01-14 RX ORDER — NALOXONE HYDROCHLORIDE 0.4 MG/ML
0.2 INJECTION, SOLUTION INTRAMUSCULAR; INTRAVENOUS; SUBCUTANEOUS
Status: DISCONTINUED | OUTPATIENT
Start: 2021-01-14 | End: 2021-01-14 | Stop reason: HOSPADM

## 2021-01-14 RX ORDER — FENTANYL CITRATE 50 UG/ML
25-50 INJECTION, SOLUTION INTRAMUSCULAR; INTRAVENOUS
Status: DISCONTINUED | OUTPATIENT
Start: 2021-01-14 | End: 2021-01-14 | Stop reason: HOSPADM

## 2021-01-14 RX ORDER — NALOXONE HYDROCHLORIDE 0.4 MG/ML
0.2 INJECTION, SOLUTION INTRAMUSCULAR; INTRAVENOUS; SUBCUTANEOUS
Status: DISCONTINUED | OUTPATIENT
Start: 2021-01-14 | End: 2021-01-16 | Stop reason: HOSPADM

## 2021-01-14 RX ORDER — HYDROCORTISONE 2.5 %
CREAM (GRAM) TOPICAL 3 TIMES DAILY PRN
Status: DISCONTINUED | OUTPATIENT
Start: 2021-01-14 | End: 2021-01-16 | Stop reason: HOSPADM

## 2021-01-14 RX ORDER — NALOXONE HYDROCHLORIDE 0.4 MG/ML
0.4 INJECTION, SOLUTION INTRAMUSCULAR; INTRAVENOUS; SUBCUTANEOUS
Status: DISCONTINUED | OUTPATIENT
Start: 2021-01-14 | End: 2021-01-14 | Stop reason: HOSPADM

## 2021-01-14 RX ORDER — ONDANSETRON 2 MG/ML
INJECTION INTRAMUSCULAR; INTRAVENOUS PRN
Status: DISCONTINUED | OUTPATIENT
Start: 2021-01-14 | End: 2021-01-14

## 2021-01-14 RX ORDER — OXYCODONE HYDROCHLORIDE 5 MG/1
5 TABLET ORAL
Status: COMPLETED | OUTPATIENT
Start: 2021-01-14 | End: 2021-01-14

## 2021-01-14 RX ORDER — LIDOCAINE HYDROCHLORIDE 10 MG/ML
INJECTION, SOLUTION INFILTRATION; PERINEURAL PRN
Status: DISCONTINUED | OUTPATIENT
Start: 2021-01-14 | End: 2021-01-14

## 2021-01-14 RX ORDER — SODIUM CHLORIDE, SODIUM LACTATE, POTASSIUM CHLORIDE, CALCIUM CHLORIDE 600; 310; 30; 20 MG/100ML; MG/100ML; MG/100ML; MG/100ML
INJECTION, SOLUTION INTRAVENOUS CONTINUOUS
Status: DISCONTINUED | OUTPATIENT
Start: 2021-01-14 | End: 2021-01-14 | Stop reason: HOSPADM

## 2021-01-14 RX ORDER — AMOXICILLIN 250 MG
1 CAPSULE ORAL 2 TIMES DAILY
Status: DISCONTINUED | OUTPATIENT
Start: 2021-01-14 | End: 2021-01-16 | Stop reason: HOSPADM

## 2021-01-14 RX ORDER — BISACODYL 10 MG
10 SUPPOSITORY, RECTAL RECTAL DAILY PRN
Status: DISCONTINUED | OUTPATIENT
Start: 2021-01-16 | End: 2021-01-16 | Stop reason: HOSPADM

## 2021-01-14 RX ORDER — LIDOCAINE 40 MG/G
CREAM TOPICAL
Status: DISCONTINUED | OUTPATIENT
Start: 2021-01-14 | End: 2021-01-16 | Stop reason: HOSPADM

## 2021-01-14 RX ORDER — ONDANSETRON 4 MG/1
4 TABLET, ORALLY DISINTEGRATING ORAL EVERY 30 MIN PRN
Status: DISCONTINUED | OUTPATIENT
Start: 2021-01-14 | End: 2021-01-14 | Stop reason: HOSPADM

## 2021-01-14 RX ORDER — KETOROLAC TROMETHAMINE 30 MG/ML
30 INJECTION, SOLUTION INTRAMUSCULAR; INTRAVENOUS EVERY 6 HOURS
Status: COMPLETED | OUTPATIENT
Start: 2021-01-14 | End: 2021-01-15

## 2021-01-14 RX ORDER — BUPIVACAINE HYDROCHLORIDE 7.5 MG/ML
INJECTION, SOLUTION INTRASPINAL PRN
Status: DISCONTINUED | OUTPATIENT
Start: 2021-01-14 | End: 2021-01-14

## 2021-01-14 RX ORDER — MODIFIED LANOLIN
OINTMENT (GRAM) TOPICAL
Status: DISCONTINUED | OUTPATIENT
Start: 2021-01-14 | End: 2021-01-16 | Stop reason: HOSPADM

## 2021-01-14 RX ORDER — CITRIC ACID/SODIUM CITRATE 334-500MG
30 SOLUTION, ORAL ORAL
Status: COMPLETED | OUTPATIENT
Start: 2021-01-14 | End: 2021-01-14

## 2021-01-14 RX ORDER — CEFAZOLIN SODIUM 2 G/100ML
2 INJECTION, SOLUTION INTRAVENOUS
Status: DISCONTINUED | OUTPATIENT
Start: 2021-01-14 | End: 2021-01-14 | Stop reason: HOSPADM

## 2021-01-14 RX ORDER — ONDANSETRON 2 MG/ML
4 INJECTION INTRAMUSCULAR; INTRAVENOUS EVERY 30 MIN PRN
Status: DISCONTINUED | OUTPATIENT
Start: 2021-01-14 | End: 2021-01-14 | Stop reason: HOSPADM

## 2021-01-14 RX ORDER — OXYCODONE HYDROCHLORIDE 5 MG/1
5-10 TABLET ORAL EVERY 4 HOURS PRN
Status: DISCONTINUED | OUTPATIENT
Start: 2021-01-14 | End: 2021-01-16 | Stop reason: HOSPADM

## 2021-01-14 RX ORDER — CEFAZOLIN SODIUM 1 G/50ML
1 INJECTION, SOLUTION INTRAVENOUS SEE ADMIN INSTRUCTIONS
Status: DISCONTINUED | OUTPATIENT
Start: 2021-01-14 | End: 2021-01-14 | Stop reason: HOSPADM

## 2021-01-14 RX ORDER — ONDANSETRON 2 MG/ML
4 INJECTION INTRAMUSCULAR; INTRAVENOUS EVERY 6 HOURS PRN
Status: DISCONTINUED | OUTPATIENT
Start: 2021-01-14 | End: 2021-01-16 | Stop reason: HOSPADM

## 2021-01-14 RX ORDER — DEXTROSE, SODIUM CHLORIDE, SODIUM LACTATE, POTASSIUM CHLORIDE, AND CALCIUM CHLORIDE 5; .6; .31; .03; .02 G/100ML; G/100ML; G/100ML; G/100ML; G/100ML
INJECTION, SOLUTION INTRAVENOUS CONTINUOUS
Status: DISCONTINUED | OUTPATIENT
Start: 2021-01-14 | End: 2021-01-16 | Stop reason: HOSPADM

## 2021-01-14 RX ORDER — IBUPROFEN 400 MG/1
800 TABLET, FILM COATED ORAL EVERY 6 HOURS
Status: DISCONTINUED | OUTPATIENT
Start: 2021-01-15 | End: 2021-01-16 | Stop reason: HOSPADM

## 2021-01-14 RX ORDER — LIDOCAINE 40 MG/G
CREAM TOPICAL
Status: DISCONTINUED | OUTPATIENT
Start: 2021-01-14 | End: 2021-01-14 | Stop reason: HOSPADM

## 2021-01-14 RX ORDER — ACETAMINOPHEN 325 MG/1
975 TABLET ORAL EVERY 6 HOURS
Status: DISCONTINUED | OUTPATIENT
Start: 2021-01-14 | End: 2021-01-16 | Stop reason: HOSPADM

## 2021-01-14 RX ORDER — OXYTOCIN 10 [USP'U]/ML
INJECTION, SOLUTION INTRAMUSCULAR; INTRAVENOUS PRN
Status: DISCONTINUED | OUTPATIENT
Start: 2021-01-14 | End: 2021-01-14

## 2021-01-14 RX ADMIN — OXYCODONE HYDROCHLORIDE 5 MG: 5 TABLET ORAL at 18:18

## 2021-01-14 RX ADMIN — DOCUSATE SODIUM 50 MG AND SENNOSIDES 8.6 MG 1 TABLET: 8.6; 5 TABLET, FILM COATED ORAL at 21:04

## 2021-01-14 RX ADMIN — OXYCODONE HYDROCHLORIDE 5 MG: 5 TABLET ORAL at 10:07

## 2021-01-14 RX ADMIN — Medication 200 ML/HR: at 08:12

## 2021-01-14 RX ADMIN — ACETAMINOPHEN 1000 MG: 10 INJECTION, SOLUTION INTRAVENOUS at 08:15

## 2021-01-14 RX ADMIN — PHENYLEPHRINE HYDROCHLORIDE 0.01 MCG/KG/MIN: 10 INJECTION INTRAVENOUS at 07:45

## 2021-01-14 RX ADMIN — MORPHINE SULFATE 2 MG: 0.5 INJECTION, SOLUTION EPIDURAL; INTRATHECAL; INTRAVENOUS at 08:14

## 2021-01-14 RX ADMIN — SODIUM CITRATE AND CITRIC ACID MONOHYDRATE 30 ML: 500; 334 SOLUTION ORAL at 07:04

## 2021-01-14 RX ADMIN — ACETAMINOPHEN 975 MG: 325 TABLET, FILM COATED ORAL at 18:17

## 2021-01-14 RX ADMIN — OXYTOCIN 3 UNITS: 10 INJECTION, SOLUTION INTRAMUSCULAR; INTRAVENOUS at 08:04

## 2021-01-14 RX ADMIN — CEFAZOLIN SODIUM 2 G: 2 INJECTION, SOLUTION INTRAVENOUS at 07:50

## 2021-01-14 RX ADMIN — OXYCODONE HYDROCHLORIDE 5 MG: 5 TABLET ORAL at 12:50

## 2021-01-14 RX ADMIN — SODIUM CHLORIDE, POTASSIUM CHLORIDE, SODIUM LACTATE AND CALCIUM CHLORIDE: 600; 310; 30; 20 INJECTION, SOLUTION INTRAVENOUS at 06:00

## 2021-01-14 RX ADMIN — KETOROLAC TROMETHAMINE 30 MG: 30 INJECTION, SOLUTION INTRAMUSCULAR at 21:04

## 2021-01-14 RX ADMIN — SIMETHICONE 80 MG: 80 TABLET, CHEWABLE ORAL at 18:18

## 2021-01-14 RX ADMIN — KETOROLAC TROMETHAMINE 30 MG: 30 INJECTION, SOLUTION INTRAMUSCULAR at 15:38

## 2021-01-14 RX ADMIN — MORPHINE SULFATE 0.01 MG: 0.5 INJECTION, SOLUTION EPIDURAL; INTRATHECAL; INTRAVENOUS at 07:57

## 2021-01-14 RX ADMIN — KETOROLAC TROMETHAMINE 30 MG: 30 INJECTION, SOLUTION INTRAMUSCULAR at 08:35

## 2021-01-14 RX ADMIN — DEXAMETHASONE SODIUM PHOSPHATE 8 MG: 4 INJECTION, SOLUTION INTRA-ARTICULAR; INTRALESIONAL; INTRAMUSCULAR; INTRAVENOUS; SOFT TISSUE at 08:18

## 2021-01-14 RX ADMIN — BUPIVACAINE HYDROCHLORIDE IN DEXTROSE 2 ML: 7.5 INJECTION, SOLUTION SUBARACHNOID at 07:43

## 2021-01-14 RX ADMIN — ONDANSETRON 4 MG: 2 INJECTION INTRAMUSCULAR; INTRAVENOUS at 08:24

## 2021-01-14 RX ADMIN — LIDOCAINE HYDROCHLORIDE 1 ML: 10 INJECTION, SOLUTION INFILTRATION; PERINEURAL at 07:41

## 2021-01-14 RX ADMIN — MORPHINE SULFATE 2 MG: 0.5 INJECTION, SOLUTION EPIDURAL; INTRATHECAL; INTRAVENOUS at 08:08

## 2021-01-14 SDOH — HEALTH STABILITY: MENTAL HEALTH: CURRENT SMOKER: 0

## 2021-01-14 SDOH — ECONOMIC STABILITY: INCOME INSECURITY: HOW HARD IS IT FOR YOU TO PAY FOR THE VERY BASICS LIKE FOOD, HOUSING, MEDICAL CARE, AND HEATING?: NOT HARD AT ALL

## 2021-01-14 SDOH — ECONOMIC STABILITY: TRANSPORTATION INSECURITY
IN THE PAST 12 MONTHS, HAS LACK OF TRANSPORTATION KEPT YOU FROM MEETINGS, WORK, OR FROM GETTING THINGS NEEDED FOR DAILY LIVING?: NO

## 2021-01-14 SDOH — ECONOMIC STABILITY: FOOD INSECURITY: WITHIN THE PAST 12 MONTHS, YOU WORRIED THAT YOUR FOOD WOULD RUN OUT BEFORE YOU GOT MONEY TO BUY MORE.: NEVER TRUE

## 2021-01-14 SDOH — ECONOMIC STABILITY: FOOD INSECURITY: WITHIN THE PAST 12 MONTHS, THE FOOD YOU BOUGHT JUST DIDN'T LAST AND YOU DIDN'T HAVE MONEY TO GET MORE.: NEVER TRUE

## 2021-01-14 SDOH — ECONOMIC STABILITY: TRANSPORTATION INSECURITY
IN THE PAST 12 MONTHS, HAS THE LACK OF TRANSPORTATION KEPT YOU FROM MEDICAL APPOINTMENTS OR FROM GETTING MEDICATIONS?: NO

## 2021-01-14 ASSESSMENT — ACTIVITIES OF DAILY LIVING (ADL)
FALL_HISTORY_WITHIN_LAST_SIX_MONTHS: NO
TOILETING_ISSUES: NO

## 2021-01-14 NOTE — LETTER
[facility]  [address]  [place] [address]  [phone]          January 15, 2021    RE:  Ann Lopez                                                                                                                                                       [address]  [place] [address]            To whom it may concern:    Ann Lopez is under my professional care for her pregnancy. She delivered her infant on 21 via  section. Please excuse her , Juan, from work through 21 to assist her with her postoperative recovery.      Sincerely,         MD

## 2021-01-14 NOTE — ANESTHESIA PROCEDURE NOTES
Procedure note : intrathecal      Staff -   CRNA: Ronen Olivia APRN CRNA  Performed By: CRNA  Pre-Procedure    Location: OR    Procedure Times:1/14/2021 7:38 AM and 1/14/2021 7:43 AM  Pre-Anesthestic Checklist: patient identified, IV checked, site marked, risks and benefits discussed, informed consent, monitors and equipment checked, pre-op evaluation and at physician/surgeon's request    Timeout  Correct Patient: Yes   Correct Procedure: Yes   Correct Site: Yes     Correct Position: Yes     .   Procedure Documentation  ASA 2  .    Procedure: intrathecal, .   Patient Position:sitting Insertion Site:L3-4  (midline approach)     Patient Prep/Sterile Barriers; mask, sterile gloves, chlorhexidine gluconate and isopropyl alcohol, patient draped.  .  Needle:  Spinal Needle (gauge): 27  Spinal/LP Needle Length (inches): 3.5 # of attempts: 1 and # of redirects:  Introducer used Introducer: 20 G .        Assessment/Narrative  Paresthesias: No.  .  .  clear CSF fluid removed .

## 2021-01-14 NOTE — ANESTHESIA PREPROCEDURE EVALUATION
Anesthesia Pre-Procedure Evaluation    Patient: Ann Lopez   MRN: [id number] : 1980          Preoperative Diagnosis: History of  delivery, currently pregnant [O34.219]    Procedure(s):  repeat  section with bilateral salpingectomy    Past Medical History:   Diagnosis Date     Dysmenorrhea     2006     Labral tear of right hip joint      Other herpesviral infection     2004,genitial     Stress fracture 2000    left hip     Vitamin D deficiency     10/5/2016     Past Surgical History:   Procedure Laterality Date      SECTION N/A 2018    Procedure:  SECTION;;  Surgeon: Sona Newman MD;  Location: [facility] OR     ENHANCE LASER REFRACTIVE NEW PT IN PARAMETERS             Anesthesia Evaluation     . Pt has had prior anesthetic. Type: Regional    No history of anesthetic complications          ROS/MED HX    ENT/Pulmonary:  - neg pulmonary ROS     Neurologic:  - neg neurologic ROS     Cardiovascular:  - neg cardiovascular ROS       METS/Exercise Tolerance:  >4 METS   Hematologic:  - neg hematologic  ROS       Musculoskeletal:  - neg musculoskeletal ROS       GI/Hepatic:  - neg GI/hepatic ROS       Renal/Genitourinary:  - ROS Renal section negative       Endo:  - neg endo ROS       Psychiatric:  - neg psychiatric ROS       Infectious Disease:  - neg infectious disease ROS       Malignancy:      - no malignancy   Other:    - neg other ROS                      Physical Exam  Normal systems: cardiovascular, pulmonary and dental    Airway   Mallampati: II  TM distance: >3 FB  Neck ROM: full    Dental     Cardiovascular   Rhythm and rate: regular and normal      Pulmonary    breath sounds clear to auscultation            Lab Results   Component Value Date    WBC 5.9 2021    HGB 11.6 (L) 2021    HCT 34.0 (L) 2021     (L) 2021     2019    POTASSIUM 4.2 2019    CHLORIDE 108 (H) 2019    CO2 24 2019    BUN 17  08/09/2019    CR 1.03 08/09/2019    GLC 83 08/09/2019    AMADEO 9.1 08/09/2019    MAG 1.9 10/09/2017    ALBUMIN 4.0 08/09/2019    PROTTOTAL 6.6 08/09/2019    ALT 33 08/09/2019    AST 18 08/09/2019    ALKPHOS 68 08/09/2019    BILITOTAL 0.6 08/09/2019    T4 0.69 08/09/2019    T3 127 08/09/2019    HCG Negative 11/16/2018       Preop Vitals  BP Readings from Last 3 Encounters:   01/14/21 111/76   01/06/21 108/60   12/30/20 108/70    Pulse Readings from Last 3 Encounters:   01/06/21 60   12/30/20 72   12/23/20 76      Resp Readings from Last 3 Encounters:   01/14/21 20   12/16/20 12   09/23/20 18    SpO2 Readings from Last 3 Encounters:   01/14/21 100%   09/23/20 98%   03/19/20 98%      Temp Readings from Last 1 Encounters:   01/14/21 97.9  F (36.6  C) (Temporal)    Ht Readings from Last 1 Encounters:   06/12/20 1.829 m (6')      Wt Readings from Last 1 Encounters:   01/06/21 87.1 kg (192 lb)    Estimated body mass index is 26.04 kg/m  as calculated from the following:    Height as of 6/12/20: 1.829 m (6').    Weight as of 1/6/21: 87.1 kg (192 lb).       Anesthesia Plan      History & Physical Review      ASA Status:  2 .    NPO Status:  > 6 hours    Plan for Spinal (With ITN)   PONV prophylaxis:  Ondansetron (or other 5HT-3)    The patient is not a current smoker      Postoperative Care  Postoperative pain management:  Multi-modal analgesia.      Consents  Anesthetic plan, risks, benefits and alternatives discussed with:  Patient and Spouse..                 GOLDEN OTERO CRNA

## 2021-01-14 NOTE — H&P
[facility] Labor [facility] Delivery History and Physical    Ann Rosenthal MRN# [id number]   Age: 40 year old YOB: 1980     Date of Admission:  2021    Primary care provider: Tanesha Hawk           Chief Complaint:   Ann Rosenthal is a 40 year old  at 39w1d by 8w2d US admitted for scheduled repeat c/s with sterilization.          Pregnancy history:     OBSTETRIC HISTORY:    OB History    Para Term  AB Living   2 1 1 0 0 1   SAB TAB Ectopic Multiple Live Births   0 0 0 0 1      # Outcome Date GA Lbr Garcia/2nd Weight Sex Delivery Anes PTL Lv   2 Current            1 Term 18 40w3d  3.589 kg (7 lb 14.6 oz) M CS-LTranv EPI N ORION      Complications: Fetal Intolerance      Name: OH ROSENTHAL      Apgar1: 9  Apgar5: 9       EDC: Estimated Date of Delivery: 2021    Prenatal Labs:   Lab Results   Component Value Date    ABO O 2021    RH Pos 2021    AS Neg 2021    HEPBANG Nonreactive 2020    HGB 11.6 (L) 2021       GBS Status:   No results found for: GBS    Active Problem List  Patient Active Problem List   Diagnosis     Encounter for infertility counseling     Vitamin D deficiency     S/P  section     Labral tear of right hip joint     Patient is a currently breast-feeding mother     History of  delivery, currently pregnant       Medication Prior to Admission  Medications Prior to Admission   Medication Sig Dispense Refill Last Dose     Misc. Devices (BREAST PUMP) MISC 1 each as needed (lactating mother) 1 each 0      Misc. Devices (BREAST PUMP) MISC 1 each daily 1 each 0      Prenatal MV-Min-Fe Fum-FA-DHA (PRENATAL 1 PO) Take 1 tablet by mouth        valACYclovir (VALTREX) 500 MG tablet Take 1 tablet (500 mg) by mouth 2 times daily 60 tablet 0    .        Maternal Past Medical History:     Past Medical History:   Diagnosis Date     Dysmenorrhea     2006     Labral tear of right  hip joint 2018     Other herpesviral infection     2004,genitial     Stress fracture 2000    left hip     Vitamin D deficiency     10/5/2016     Past Surgical History:   Procedure Laterality Date      SECTION N/A 2018    Procedure:  SECTION;;  Surgeon: Sona Newman MD;  Location: [facility] OR     ENHANCE LASER REFRACTIVE NEW PT IN PARAMETERS                             Family History:     Family History   Problem Relation Age of Onset     Family History Negative Mother         Good Health     Alcoholism Father         Alcoholism     Heart Disease Maternal Grandmother         Heart Disease     Emphysema Paternal Grandmother         Emphysema     Leukemia Paternal Grandfather         Leukemia     Thyroid Disease Sister         Thyroid Disease,borderline; infertility; 3 yrs younger     Thyroid Disease Other         Thyroid Disease,father's side of the family             Social History:     Social History     Tobacco Use     Smoking status: Never Smoker     Smokeless tobacco: Never Used   Substance Use Topics     Alcohol use: No            Review of Systems:   The Review of Systems is negative other than noted in the HPI          Physical Exam:     Patient Vitals for the past 8 hrs:   BP Temp Temp src Resp SpO2   21 0534 111/76 97.9  F (36.6  C) Temporal 20 100 %     Gen: resting in bed, NA  CV: RRR  Resp: CTAB  Abd: gravid, soft nontender  Ext: nontender    Cervix: deferred  Membranes: intact  EFW: 7.5 lbs  Presentation:Cephalic    Fetal Heart Rate Tracin, mod, + accels, no decels  Tocometer: quiet        Assessment:   Ann Lopez is a 40 year old  at 39w1d admitted with scheduled repeat c/s with sterilization.          Plan:   Prepare for c/s    Sona Newman MD

## 2021-01-14 NOTE — OR NURSING
PACU Transfer Note    Ann Lopez remains in [facility] after PACU cares.  PACU Respiratory Event Documentation     1) Episodes of Apnea greater than or equal to 10 seconds: No    2) Bradypnea - less than 8 breaths per minute: No    3) Pain score on 0 to 10 scale: 0    4) Pain-sedation mismatch (yes or no): No    5) Repeated 02 desaturation less than 90% (yes or no): No    Anesthesia notified? (yes or no): No    Any of the above events occuring repeatedly in separate 30 minute intervals may be considered recurrent PACU respiratory events.    Patient stable and meets phase 1 discharge criteria for transport from PACU. Report given to Tiffanie PANIAGUA.

## 2021-01-14 NOTE — ANESTHESIA CARE TRANSFER NOTE
Patient: Ann Lopez    Procedure(s):  repeat  section with bilateral salpingectomy    Diagnosis: History of  delivery, currently pregnant [O34.219]  Diagnosis Additional Information: No value filed.    Anesthesia Type:   Spinal     Note:  Airway :Room Air  Patient transferred to:Labor and Delivery  Handoff Report: Identifed the Patient, Identified the Reponsible Provider, Reviewed the pertinent medical history, Discussed the surgical course, Reviewed Intra-OP anesthesia mangement and issues during anesthesia, Set expectations for post-procedure period and Allowed opportunity for questions and acknowledgement of understanding      Vitals: (Last set prior to Anesthesia Care Transfer)    CRNA VITALS  2021 0820 - 2021 0901      2021             Resp Rate (set):  10                Electronically Signed By: GOLDEN OTERO CRNA  2021  9:01 AM

## 2021-01-14 NOTE — ANESTHESIA POSTPROCEDURE EVALUATION
Patient: Ann Lopez    Procedure(s):  repeat  section with bilateral salpingectomy    Diagnosis:History of  delivery, currently pregnant [O34.219]  Diagnosis Additional Information: No value filed.    Anesthesia Type:  Spinal    Note:  Anesthesia Post Evaluation    Patient location during evaluation: OB PACU  Patient participation: Able to fully participate in evaluation  Level of consciousness: awake and alert  Pain management: adequate  Airway patency: patent  Cardiovascular status: acceptable  Respiratory status: acceptable  Hydration status: acceptable  PONV: none     Anesthetic complications: None          Last vitals:  Vitals:    21 1003 21 1014 21 1029   BP:  101/62 105/63   Pulse:      Resp:      Temp:      SpO2: 96% 94%          Electronically Signed By: GOLDEN Murguia CRNA  2021  11:25 AM

## 2021-01-14 NOTE — LETTER
[facility]  [address]  [place] [address]  [phone]          January 15, 2021    RE:  Ann Lopez                                                                                                                                                       [address]  [place] [address]            To whom it may concern:    Ann Lopez is under my professional care for her pregnancy. She delivered her infant on 21 via  section. Please excuse her , Juan, from work through 21 to assist her with her postoperative recovery.      Sincerely,      Sona Newman MD

## 2021-01-14 NOTE — OP NOTE
[facility]  Obstetrics Service Operative Report    Surgery Date:  2021  Surgeon(s): Sona Newman MD   Assistants: Ellyn Johnson MD family medicine    Preoperative Diagnoses:  1.  Intrauterine pregnancy at 39w1d  2.  History of  section x1  3.  Desire for sterilization    Postoperative diagnoses: Same     Procedure performed:  Repeat low segment transverse  section via Pfannenstiel incision with double layer uterine closure, bilateral salpingectomy, scar revision    Anesthesia:  Spinal with duramorph  Est Blood Loss (mL): 700 mL  Specimens: left and right fallopian tubes  Complications: None      Operative findings: Single viable male infant at 0803 hours on 2021. Apgars of 8 and 9 at one and five minutes.  Birth weight (GM): pending GM.  Fetal presentation: cephalic.  Position: ELOY  Amniotic fluid: clear.  Placenta intact with 3 vessel cord.   Normal appearing uterus, fallopian tubes, ovaries.  Filmy adhesions of descending colon to left IP ligament. No other intraabdominal adhesions. Moderate subcutaneous and rectofascial adhesions. Keloid prior  scar.      Indication: Ann Lopez is a 40 year old, , who was admitted at 39w1d by 8w2d ultrasound for scheduled repeat  section.  She also had completed childbearing and desired sterilization. The risks, benefits, and alternatives of  delivery were explained and the patient agreed to proceed.     Procedure details:  After obtaining informed consent, the patient was taken to the operating room. She received 2g Ancef prior to the skin incision. She was placed in the dorsal supine position with a leftward tilt and prepped and draped in the usual sterile fashion. Following test of adequate spinal anesthesia, the abdomen was entered through a transverse skin incision through her previous scar. The skin incision was made sharply and carried through the subcutaneous tissue to the fascia.   Fascia was incised in the midline and extended laterally with the Castro scissors.  The superior margin of the fascial incision was grasped with Kocher clamps and dissected from the underlying muscle sharp and blunt dissecton, which was then repeated at the lower margin of the fascial incision.  The muscle was  in the midline.  The peritoneum was entered bluntly and the opening extended by digital dissection with care to avoid the bladder.  An Maximino O retractor was placed. The vesicouterine peritoneum was entered sharply with Metzenbaum scissors and incision extended laterally. The bladder flap was created digitally. The lower segment of the uterus was opened sharply in a transverse fashion and extended with digital pressure. The infant's head was elevated to the level of the hysterotomy and was delivered atraumatically. The placenta was expressed.  The uterus was exteriorized from the abdomen and cleared of all clots and debris.  The uterus was massaged and was noted to be firm.  Oxytocin was given through the running IV.  The hysterotomy was repaired with 0-vicryl suture in a running locked fashion. A 2nd layer of 0-monocryl was used to imbricate the incision and good hemostasis was achieved. The bladder flap was inspected and found to be hemostatic.      Attention was turned to the fallopian tubes. The right fallopian tube was elevated. The avascular segments were divided with cautery. Larger vessels were individually ligated with 3-0 vicryl from the fimbriated end toward the cornua. The fallopian tube ligated with 3-0 vicryl 1 cm from the cornua. The tube was excised and sent for pathology. The same procedure was then completed on the left.     The posterior cul-de-sac was suctioned and the uterus was returned to the abdomen.  The bilateral pericolic gutters were suctioned.  The hysterotomy was again inspected and found to have no active sites of bleeding.  The abdominal wall was examined and found to  have no active sites of bleeding.  The fascia was closed with a running suture of 0-vicryl.  Subcutaneous tissue was irrigated. Areas that were oozing were controlled with cautery. The keloid scar was sharply excised to facilitate closure. The subcutaneous tissue was reapproximated with 3-0 plain gut. The skin was closed with 4-0 vicryl. The patient tolerated the procedure well and was taken to the recovery room in stable condition. All sponge, needle and instrument counts were correct x2.     Assistant: Dr Del Garza was requested to be present for this  section due to possible need for  resuscitation.    Sona Newman  Ob/Gyn   2021 8:56 AM

## 2021-01-14 NOTE — PROGRESS NOTES
Patient arrived with  for scheduled Repeat  and tubal ligation. , 39 1/7 weeks, GBS Neg, COVID NEG. Planning on breastfeeding, doesn't want baby to have hepatitis B vaccine, and would like a circ if baby is a boy. Denies any pain. NST completed, category 1 tracing  bpm, no contractions. IV fluid bolus started. Consents all signed and in chart.

## 2021-01-15 LAB — HGB BLD-MCNC: 9.9 G/DL (ref 11.7–15.7)

## 2021-01-15 PROCEDURE — 36415 COLL VENOUS BLD VENIPUNCTURE: CPT | Performed by: OBSTETRICS & GYNECOLOGY

## 2021-01-15 PROCEDURE — 85018 HEMOGLOBIN: CPT | Performed by: OBSTETRICS & GYNECOLOGY

## 2021-01-15 PROCEDURE — 250N000013 HC RX MED GY IP 250 OP 250 PS 637: Performed by: NURSE ANESTHETIST, CERTIFIED REGISTERED

## 2021-01-15 PROCEDURE — 250N000013 HC RX MED GY IP 250 OP 250 PS 637: Performed by: OBSTETRICS & GYNECOLOGY

## 2021-01-15 PROCEDURE — 120N000001 HC R&B MED SURG/OB

## 2021-01-15 PROCEDURE — 250N000011 HC RX IP 250 OP 636: Performed by: OBSTETRICS & GYNECOLOGY

## 2021-01-15 RX ADMIN — IBUPROFEN 800 MG: 400 TABLET, FILM COATED ORAL at 09:43

## 2021-01-15 RX ADMIN — ACETAMINOPHEN 975 MG: 325 TABLET, FILM COATED ORAL at 00:07

## 2021-01-15 RX ADMIN — ACETAMINOPHEN 975 MG: 325 TABLET, FILM COATED ORAL at 06:12

## 2021-01-15 RX ADMIN — IBUPROFEN 800 MG: 400 TABLET, FILM COATED ORAL at 21:36

## 2021-01-15 RX ADMIN — IBUPROFEN 800 MG: 400 TABLET, FILM COATED ORAL at 14:55

## 2021-01-15 RX ADMIN — DOCUSATE SODIUM 50 MG AND SENNOSIDES 8.6 MG 1 TABLET: 8.6; 5 TABLET, FILM COATED ORAL at 21:36

## 2021-01-15 RX ADMIN — DOCUSATE SODIUM 50 MG AND SENNOSIDES 8.6 MG 1 TABLET: 8.6; 5 TABLET, FILM COATED ORAL at 09:43

## 2021-01-15 RX ADMIN — ACETAMINOPHEN 975 MG: 325 TABLET, FILM COATED ORAL at 11:59

## 2021-01-15 RX ADMIN — ACETAMINOPHEN 975 MG: 325 TABLET, FILM COATED ORAL at 18:08

## 2021-01-15 RX ADMIN — OXYCODONE HYDROCHLORIDE 5 MG: 5 TABLET ORAL at 00:07

## 2021-01-15 RX ADMIN — KETOROLAC TROMETHAMINE 30 MG: 30 INJECTION, SOLUTION INTRAMUSCULAR at 03:15

## 2021-01-15 NOTE — PLAN OF CARE
Pt up ad haim in room. Pain controlled. Rotating meds every 3 hours. Abdominal binder on. Incision dry and intact. Using IS. Requests to keep parr in though out the night. IV saline locked. Drinking 4 liters since OR. Breastfeeding independently.

## 2021-01-15 NOTE — PLAN OF CARE
Pt is doing well after her  section. Her dressing is still in place and does not have visible drainage on it.. Fundus firm at umbilicus with light flow and no clots. Pt is breast feeding well. Pt has had minimal amounts of pain that is well managed with oral analgesics and IV Toradol. Pt is ambulating appropriately in room. Rajan catheter removed at midnight, pt has since voided 500mL. Pt has verbalized understanding of routine cares as well as warning signs to be aware of.

## 2021-01-15 NOTE — PROGRESS NOTES
OB/GYN Postpartum Note      S:  Patient is feeling well this morning.  Lochia light.  Eating and drinking without nausea.  Ambulating without difficulty.  Pain is well controlled.   Breastfeeding without questions or concerns.      O:   Vitals:    21 1542 21 2003 01/15/21 0004 01/15/21 0318   BP: 110/63 123/62 114/64 107/59   Cuff Size:       Pulse: 82 68 78 58   Resp: 18 16 16 16   Temp: 97.9  F (36.6  C) 98.2  F (36.8  C) 98.7  F (37.1  C) 98.1  F (36.7  C)   TempSrc: Temporal Temporal Temporal Temporal   SpO2: 97% 99% 98% 99%     General: resting in bed, in NAD  Resp: nonlabored  Abdomen: soft, nontender, nondistended  Fundus firm at umbilicus  Incision: covered in dressing  Extremities: no edema in BLE    Hemoglobin   Date Value Ref Range Status   01/15/2021 9.9 (L) 11.7 - 15.7 g/dL Final   ]    A: Ms. Ann Lopez is a 40 year old  POD #1 s/p RLTCS, BS    P:  Heme 11.6 >  > 9.9 asymptomatic.  GI: tolerating regular diet  Feeding: breast  Contraception: s/p BS  Rubella Immune  Rh positive  Disposition: routine cares, anticipate discharge tomorrow    Sona Newman MD  OB/GYN  1/15/2021 8:44 AM

## 2021-01-16 VITALS
RESPIRATION RATE: 16 BRPM | OXYGEN SATURATION: 97 % | TEMPERATURE: 98.4 F | DIASTOLIC BLOOD PRESSURE: 63 MMHG | HEART RATE: 60 BPM | SYSTOLIC BLOOD PRESSURE: 120 MMHG

## 2021-01-16 PROCEDURE — 99207 PR NO CHARGE LOS: CPT | Performed by: OBSTETRICS & GYNECOLOGY

## 2021-01-16 PROCEDURE — 250N000013 HC RX MED GY IP 250 OP 250 PS 637: Performed by: OBSTETRICS & GYNECOLOGY

## 2021-01-16 RX ORDER — AMOXICILLIN 250 MG
1 CAPSULE ORAL 2 TIMES DAILY PRN
Qty: 20 TABLET | Refills: 0 | Status: SHIPPED | OUTPATIENT
Start: 2021-01-16 | End: 2022-02-03

## 2021-01-16 RX ORDER — IBUPROFEN 600 MG/1
600 TABLET, FILM COATED ORAL EVERY 6 HOURS PRN
Qty: 30 TABLET | Refills: 0 | Status: SHIPPED | OUTPATIENT
Start: 2021-01-16 | End: 2022-02-04

## 2021-01-16 RX ADMIN — ACETAMINOPHEN 975 MG: 325 TABLET, FILM COATED ORAL at 00:05

## 2021-01-16 RX ADMIN — IBUPROFEN 800 MG: 400 TABLET, FILM COATED ORAL at 09:38

## 2021-01-16 RX ADMIN — IBUPROFEN 800 MG: 400 TABLET, FILM COATED ORAL at 03:09

## 2021-01-16 RX ADMIN — DOCUSATE SODIUM 50 MG AND SENNOSIDES 8.6 MG 1 TABLET: 8.6; 5 TABLET, FILM COATED ORAL at 09:38

## 2021-01-16 RX ADMIN — ACETAMINOPHEN 975 MG: 325 TABLET, FILM COATED ORAL at 07:40

## 2021-01-16 NOTE — DISCHARGE SUMMARY
[facility]    Discharge Summary  Obstetrics    Date of Admission:  2021  Date of Discharge:  2021  Discharging Provider: Stefan Carrizales    Discharge Diagnoses   History of  delivery, currently pregnant [O34.219]    Procedure/Surgery Information   Procedure: Procedure(s):  repeat  section with bilateral salpingectomy   Surgeon(s): Surgeon(s) and Role:     * Sona Newman MD - Primary     History of Present Illness   Ann Lopez is a 40 year old female who presented with repeat     Hospital Course   The patient's hospital course was unremarkable.  She recovered as anticipated and experienced no post-operative complications. On discharge, her pain was well controlled. Vaginal bleeding is similar to peak menstrual flow.  Voiding without difficulty.  Ambulating well and tolerating a normal diet.  No fever or significant wound drainage.  Breastfeeding well.  Infant is stable.  She was discharged on post-partum day #2.    Post-partum hemoglobin:   Hemoglobin   Date Value Ref Range Status   01/15/2021 9.9 (L) 11.7 - 15.7 g/dL Final       Stefan Carrizales MD    Discharge Disposition   Discharged to home   Condition at discharge: Stable    Pending Results   Final pathology results: Pending at time of discharge  These results will be followed up by Dr.KL Newman  Unresulted Labs Ordered in the Past 30 Days of this Admission     Date and Time Order Name Status Description    2021 0813 Surgical pathology exam In process           Primary Care Physician   ALLEN REYES    Consultations This Hospital Stay   HOME CARE POST PARTUM/ IP CONSULT  LACTATION IP CONSULT    Discharge Orders      Activity    Review discharge instructions     Reason for your hospital stay    Maternity care     Follow Up and recommended labs and tests    Follow up with me,  Stefan Carrizales MD, within 6 weeks. for hospital follow- up.  No follow up labs or test are  needed.     Discharge Instructions - Postpartum visit    Schedule postpartum visit with your provider and return to clinic in 6 weeks. Also 2 weeks if a  section was done.     Diet    Resume previous diet     Discharge Medications   Current Discharge Medication List      START taking these medications    Details   ibuprofen (ADVIL/MOTRIN) 600 MG tablet Take 1 tablet (600 mg) by mouth every 6 hours as needed for moderate pain  Qty: 30 tablet, Refills: 0    Associated Diagnoses: S/P  section      senna-docusate (SENOKOT-S/PERICOLACE) 8.6-50 MG tablet Take 1 tablet by mouth 2 times daily as needed for constipation  Qty: 20 tablet, Refills: 0    Associated Diagnoses: S/P  section         CONTINUE these medications which have NOT CHANGED    Details   !! Misc. Devices (BREAST PUMP) MISC 1 each as needed (lactating mother)  Qty: 1 each, Refills: 0    Associated Diagnoses: Lactating mother      !! Misc. 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 - Potential duplicate medications found. Please discuss with provider.        Allergies   No Known Allergies

## 2021-01-16 NOTE — PROGRESS NOTES
Pt is doing well, VSS. Fundus is firm, bleeding is scant. She has only been taking Tylenol and Ibuprofen and rates pain 0/10. Incision looks good, some scant serosanguinous  drainage on gauze pad. She is breastfeeding on demand and feels like her milk has come in. Both her and spouse are very attentive to infant and bonding well. Parents are being discharged home with infant. Infant and mother band number compared. Parents are competent with car seat safety and have demonstrated such. All discharge instructions have been gone over to patient's satisfaction. They do not have questions at this time.

## 2021-01-16 NOTE — PLAN OF CARE
Pt doing very well. Denies pain this shift. Continuing with scheduled ibuprofen and Tylenol. Incision is CDI, well approximated, steri strip in place. Lochia scant, no clots. Fundus U/U. VSS. Mother very attentive to baby, breastfeeding baby on demand.

## 2021-01-16 NOTE — PLAN OF CARE
Pt is doing well, VSS. Fundus is firm, bleeding is scant. Pain is controlled with Ibuprofen and Tylenol. Incision is well approximated, no drainage, open to air. Pt is breastfeeding on demand every 2-3 hours. She demonstrates excellent technique and handles baby very well. Both parents are very attentive and bonding well.

## 2021-01-16 NOTE — PROGRESS NOTES
[facility]    Obstetrics Post-Op / Progress Note    Assessment & Plan   Assessment:  -2 Days Post-Op  Procedure(s):  repeat  section with bilateral salpingectomy    Doing well.    Plan:  Discharge later today    Stefan Carrizales     Interval History   Doing well.  Pain is well-controlled.  No fevers.  No history of wound drainage, warmth or significant erythema.  Good appetite.  Denies chest pain, shortness of breath, nausea or vomiting.  Ambulatory.  Breastfeeding well.    Medications     dextrose 5% lactated ringers       - MEDICATION INSTRUCTIONS -       NO Rho (D) immune globulin (RhoGam) needed - mother Rh POSITIVE       oxytocin in 0.9% NaCl 100 mL/hr (21 1001)     oxytocin in 0.9% NaCl         acetaminophen  975 mg Oral Q6H     ibuprofen  800 mg Oral Q6H     senna-docusate  1 tablet Oral BID    Or     senna-docusate  2 tablet Oral BID     sodium chloride (PF)  3 mL Intracatheter Q8H     Tdap (tetanus-diptheria-acell pertussis)  0.5 mL Intramuscular Once       Physical Exam   Temp: 97.5  F (36.4  C) Temp src: Temporal BP: 113/62     Resp: 16 SpO2: 97 % O2 Device: None (Room air)    There were no vitals filed for this visit.  Vital Signs with Ranges  Temp:  [97.5  F (36.4  C)-98.3  F (36.8  C)] 97.5  F (36.4  C)  Resp:  [15-16] 16  BP: ()/(53-62) 113/62  SpO2:  [96 %-98 %] 97 %  No intake/output data recorded.    Uterine fundus is firm, non-tender and at the level of the umbilicus  Incision C/D/I  Extremities Non-tender    Data   Recent Labs   Lab Test 21  0600   ABO O   RH Pos   AS Neg     Recent Labs   Lab Test 01/15/21  0605 21  0600   HGB 9.9* 11.6*     Recent Labs   Lab Test 20  1020   RUQIGG 164

## 2021-01-18 ENCOUNTER — HOSPITAL ENCOUNTER (OUTPATIENT)
Dept: OBGYN | Facility: OTHER | Age: 41
End: 2021-01-18
Attending: OBSTETRICS & GYNECOLOGY
Payer: COMMERCIAL

## 2021-01-18 ENCOUNTER — LACTATION ENCOUNTER (OUTPATIENT)
Age: 41
End: 2021-01-18

## 2021-01-18 PROCEDURE — S9443 LACTATION CLASS: HCPCS | Performed by: REGISTERED NURSE

## 2021-01-18 NOTE — LACTATION NOTE
This note was copied from a baby's chart.  Outpatient Lactation Visit    Angelo Lopez  [id number]    Consultation Date: 2021     Reason for Lactation Referral: Initial Lactation Consult    Baby's : 2021    Baby's Current Age: 4 day old  Baby's Gestational Age: Gestational Age: 39w1d    Primary Care Provider: Tanesha Hawk    Presenting Problem (concerns as stated by parent): no concerns    MATERNAL HISTORY   History of Breast Surgery: no  Breast Changes During Pregnancy: no  Breast Feeding History: nursed first child for 11+ months  Maternal Meds: daily prenatal vitamin  Pregnancy Complications: none  Anesthesia during labor: spinal    MATERNAL ASSESSMENT    Breast Size: average, symmetrical, soft after feeding and filling prior to feeding  Nipple Appearance - Left: slightly cracked, with signs of healing, education on further healing techniques provided  Nipple Appearance - Right: slightly cracked, with signs of healing, education on further healing techniques provided  Nipple Erectility - Left: erect with stimulation  Nipple Erectility - Right: erect with stimulation  Areolas Compressibility: soft  Nipple Size: average  Special Equipment Used:   Day mother reports milk came in:  Day 2    INFANT ASSESSMENT    Oral Anatomy  Mouth: normal  Palate: normal  Jaw: normal  Tongue: normal  Frenulum: normal   Digital Suck Exam: root    FEEDING   Feeding Time: aggressively for 5 minutes  Position:  cradle  Effort to Latch: awake and alert, latched easily  Duration of Breast Feeding: Right Breast: 5 min; Left Breast: 0  Results: good breast feed    Volume of Intake:    Birth Weight: 7 lb 7.4 oz    Hospital discharge weight: 6 lb 14.7 oz    Today's Weight 7 lb 3 oz    Total Intake: 0.5 oz (not very interested at today's appt)  Output: 4-5 soil diapers in last 24 hours, 4-5 wet diapers in last 24 hours    LATCH Score:   Latch: 2 - Good Latch  Audible Swallowin - Spontaneous & frequent  Type  of Nipple: (Breast/Nipple) 2 - Everted  Comfort: 2 - Soft, Nontender  Hold: 2 - No Assist   Total LATCH Score:  10    FEEDING PLAN    Home Feeding Plan: Continue to feed on demand when  elicits feeding cues with deep latch.  Babe should be eating 8-12 times in a 24 hour period.  Exclusivity explained and encouraged in the early weeks to establish breastfeeding and order in milk supply.  Rooming-in encouraged with explanation of the benefits.  Continue to apply expressed breast milk and Lanolin cream to nipples after feedings for healing and comfort.  Postpartum breastfeeding assessment completed and education provided.  Items included in the education are:     proper positioning and latch    effectiveness of feeding    manual expression    handling and storing breastmilk    maintenance of breastfeeding for the first 6 months    sign/symptoms of infant feeding issues requiring referral to qualified health care provider    LACTATION COMMENTS   Deep latch explained for proper positioning of breast in infant's mouth, maximizing milk transfer and comfort.  Reassurance and encouragement provided in regard to mom's concerns about milk supply.  Follow-up support information provided.  Parents plan to keep [facility] Well-Child Check with Dr. Johnson as scheduled for 2 week well child check.      Face-to-face Time: 60 minutes with assessment and education.    Shalonda Cedeno RN  2021  9:33 AM

## 2021-01-29 ENCOUNTER — OFFICE VISIT (OUTPATIENT)
Dept: OBGYN | Facility: OTHER | Age: 41
End: 2021-01-29
Attending: OBSTETRICS & GYNECOLOGY
Payer: COMMERCIAL

## 2021-01-29 VITALS
SYSTOLIC BLOOD PRESSURE: 110 MMHG | BODY MASS INDEX: 23.33 KG/M2 | DIASTOLIC BLOOD PRESSURE: 70 MMHG | WEIGHT: 172 LBS | HEART RATE: 65 BPM

## 2021-01-29 DIAGNOSIS — Z98.891 S/P CESAREAN SECTION: Primary | ICD-10-CM

## 2021-01-29 PROCEDURE — 99024 POSTOP FOLLOW-UP VISIT: CPT | Performed by: OBSTETRICS & GYNECOLOGY

## 2021-01-29 ASSESSMENT — PAIN SCALES - GENERAL: PAINLEVEL: NO PAIN (0)

## 2021-01-29 NOTE — PROGRESS NOTES
Postoperative Visit  2021    S: Ann Lopez is a 40 year old  here for post-operative visit following RLTCS on 21. She reports feeling well. Pain has been good. No incisional concerns. Light lochia. Breastfeeding going well. Sleep going well- better than her first. Breastfeeding going well    O:   /70 (BP Location: Right arm, Patient Position: Sitting, Cuff Size: Adult Large)   Pulse 65   Wt 78 kg (172 lb)   LMP 2020 (Exact Date)   Breastfeeding Yes   BMI 23.33 kg/m    Gen:  Well-appearing, NAD  Abd: soft, nondistended, nontender. Incision c/d/i    A/P:   Ms. Ann Lopez is a 40 year old  two weeks s/p RLTCS. Doing well, no concerns  RTC 4 weeks for postpartum visit    Sona Newman MD  OB/GYN  2021 9:19 AM

## 2021-01-29 NOTE — NURSING NOTE
Chief Complaint   Patient presents with     Surgical Followup     2 week post         Medication Reconciliation: completed   Veronica Aguiar LPN  2021 9:21 AM

## 2021-02-26 ENCOUNTER — PRENATAL OFFICE VISIT (OUTPATIENT)
Dept: OBGYN | Facility: OTHER | Age: 41
End: 2021-02-26
Attending: OBSTETRICS & GYNECOLOGY
Payer: COMMERCIAL

## 2021-02-26 VITALS
HEART RATE: 60 BPM | SYSTOLIC BLOOD PRESSURE: 102 MMHG | BODY MASS INDEX: 23.73 KG/M2 | WEIGHT: 175 LBS | DIASTOLIC BLOOD PRESSURE: 60 MMHG

## 2021-02-26 PROCEDURE — 99207 PR POST-PARTUM 6 WK VISIT - GICH ONLY: CPT | Performed by: OBSTETRICS & GYNECOLOGY

## 2021-02-26 ASSESSMENT — PAIN SCALES - GENERAL: PAINLEVEL: NO PAIN (0)

## 2021-02-26 NOTE — NURSING NOTE
Chief Complaint   Patient presents with     Post Partum Exam     6 week post partum        Medication Reconciliation: completed   Veronica Aguiar LPN  2/26/2021 9:26 AM

## 2021-02-26 NOTE — PROGRESS NOTES
6 week Postpartum Visit Note    S:  Ms. Ann Lopez is a 40 year old  here for her 6-week postpartum checkup.   - Had a RLTCS, BS on 21.  - Infant gender:  boy, weight 7 pounds 7.4 oz.  - Feeding Method:  Exclusively pumping.  Complications reported with feeding:  none, infant thriving .    - Bleeding:  None.  Duration:  Stopped after 2 weeks.  Menses resumed:  No  - Bowel/Urinary problems:  No  - Mood: good  - Sleep: good    - Contraception Planned:  S/p sterilization   - Current tobacco use:  No  - Hx of Abuse:  No  ================================================================  ROS: 10 point ROS neg other than the symptoms noted above in the HPI.     O:  /60 (BP Location: Right arm, Patient Position: Sitting, Cuff Size: Adult Regular)   Pulse 60   Wt 79.4 kg (175 lb)   LMP 2020 (Exact Date)   Breastfeeding No   BMI 23.73 kg/m    Gen: Well-appearing, NAD  Psych:  Appropriate mood and affect  Breast: Deferred, no concerns  Abd:  Benign and Soft, flat, non-tender  Inc:   well healed     A/P:  Ms. Ann Lopez is a 40 year old  here for 6 week postpartum visit after RLTCS. Doing well.  - Contraception: s/p sterilization  - Feeding: breast/exclusively pumping  - Follow-up: annually or sooner prn. Needs mammogram- start 6 months after completion of breastfeeding    Sona Newman MD  OB/GYN  2021 9:38 AM

## 2021-04-12 ENCOUNTER — MYC MEDICAL ADVICE (OUTPATIENT)
Dept: FAMILY MEDICINE | Facility: OTHER | Age: 41
End: 2021-04-12

## 2021-04-12 DIAGNOSIS — S05.90XA EYE INJURY, UNSPECIFIED LATERALITY, INITIAL ENCOUNTER: Primary | ICD-10-CM

## 2021-04-25 ENCOUNTER — HEALTH MAINTENANCE LETTER (OUTPATIENT)
Age: 41
End: 2021-04-25

## 2021-06-01 ENCOUNTER — MEDICAL CORRESPONDENCE (OUTPATIENT)
Dept: HEALTH INFORMATION MANAGEMENT | Facility: OTHER | Age: 41
End: 2021-06-01

## 2021-08-06 ENCOUNTER — MEDICAL CORRESPONDENCE (OUTPATIENT)
Dept: HEALTH INFORMATION MANAGEMENT | Facility: OTHER | Age: 41
End: 2021-08-06

## 2021-08-27 ENCOUNTER — HOSPITAL ENCOUNTER (EMERGENCY)
Facility: OTHER | Age: 41
Discharge: HOME | End: 2021-08-27
Attending: PHYSICIAN ASSISTANT | Admitting: PHYSICIAN ASSISTANT
Payer: COMMERCIAL

## 2021-08-27 ENCOUNTER — OFFICE VISIT (OUTPATIENT)
Dept: FAMILY MEDICINE | Facility: OTHER | Age: 41
End: 2021-08-27
Attending: NURSE PRACTITIONER
Payer: COMMERCIAL

## 2021-08-27 ENCOUNTER — ALLIED HEALTH/NURSE VISIT (OUTPATIENT)
Dept: FAMILY MEDICINE | Facility: OTHER | Age: 41
End: 2021-08-27
Attending: FAMILY MEDICINE
Payer: COMMERCIAL

## 2021-08-27 VITALS
TEMPERATURE: 101.2 F | SYSTOLIC BLOOD PRESSURE: 104 MMHG | DIASTOLIC BLOOD PRESSURE: 66 MMHG | BODY MASS INDEX: 22.81 KG/M2 | HEART RATE: 87 BPM | RESPIRATION RATE: 18 BRPM | WEIGHT: 168.43 LBS | OXYGEN SATURATION: 97 % | HEIGHT: 72 IN

## 2021-08-27 VITALS
TEMPERATURE: 102 F | RESPIRATION RATE: 18 BRPM | WEIGHT: 168.5 LBS | SYSTOLIC BLOOD PRESSURE: 118 MMHG | DIASTOLIC BLOOD PRESSURE: 66 MMHG | HEART RATE: 81 BPM | BODY MASS INDEX: 22.85 KG/M2 | OXYGEN SATURATION: 98 %

## 2021-08-27 DIAGNOSIS — R05.9 COUGH: Primary | ICD-10-CM

## 2021-08-27 DIAGNOSIS — R07.0 THROAT PAIN: ICD-10-CM

## 2021-08-27 DIAGNOSIS — G47.00 PERSISTENT INSOMNIA: ICD-10-CM

## 2021-08-27 DIAGNOSIS — B97.89 SORE THROAT DUE TO VIRUS: ICD-10-CM

## 2021-08-27 DIAGNOSIS — J02.8 SORE THROAT DUE TO VIRUS: ICD-10-CM

## 2021-08-27 DIAGNOSIS — U07.1 CLINICAL DIAGNOSIS OF COVID-19: ICD-10-CM

## 2021-08-27 DIAGNOSIS — J02.0 STREPTOCOCCAL SORE THROAT: Primary | ICD-10-CM

## 2021-08-27 LAB
GROUP A STREP BY PCR: NOT DETECTED
SARS-COV-2 RNA RESP QL NAA+PROBE: POSITIVE

## 2021-08-27 PROCEDURE — 99213 OFFICE O/P EST LOW 20 MIN: CPT | Performed by: NURSE PRACTITIONER

## 2021-08-27 PROCEDURE — 250N000013 HC RX MED GY IP 250 OP 250 PS 637: Performed by: PHYSICIAN ASSISTANT

## 2021-08-27 PROCEDURE — 99283 EMERGENCY DEPT VISIT LOW MDM: CPT | Performed by: PHYSICIAN ASSISTANT

## 2021-08-27 PROCEDURE — 87651 STREP A DNA AMP PROBE: CPT | Mod: ZL | Performed by: NURSE PRACTITIONER

## 2021-08-27 PROCEDURE — U0003 INFECTIOUS AGENT DETECTION BY NUCLEIC ACID (DNA OR RNA); SEVERE ACUTE RESPIRATORY SYNDROME CORONAVIRUS 2 (SARS-COV-2) (CORONAVIRUS DISEASE [COVID-19]), AMPLIFIED PROBE TECHNIQUE, MAKING USE OF HIGH THROUGHPUT TECHNOLOGIES AS DESCRIBED BY CMS-2020-01-R: HCPCS | Mod: ZL

## 2021-08-27 PROCEDURE — C9803 HOPD COVID-19 SPEC COLLECT: HCPCS

## 2021-08-27 RX ORDER — HYDROXYZINE PAMOATE 25 MG/1
25 CAPSULE ORAL
Qty: 15 CAPSULE | Refills: 0 | Status: SHIPPED | OUTPATIENT
Start: 2021-08-27 | End: 2022-03-13

## 2021-08-27 RX ORDER — ACETAMINOPHEN 500 MG
1000 TABLET ORAL ONCE
Status: COMPLETED | OUTPATIENT
Start: 2021-08-27 | End: 2021-08-27

## 2021-08-27 RX ORDER — LIDOCAINE HYDROCHLORIDE 20 MG/ML
10 SOLUTION OROPHARYNGEAL
Qty: 200 ML | Refills: 0 | Status: SHIPPED | OUTPATIENT
Start: 2021-08-27 | End: 2022-02-03

## 2021-08-27 RX ADMIN — ACETAMINOPHEN 1000 MG: 500 TABLET, FILM COATED ORAL at 20:29

## 2021-08-27 ASSESSMENT — ENCOUNTER SYMPTOMS
SEIZURES: 0
WHEEZING: 0
TROUBLE SWALLOWING: 1
ABDOMINAL PAIN: 0
AGITATION: 0
SORE THROAT: 1
CHILLS: 1
BACK PAIN: 0
FLANK PAIN: 0
NECK PAIN: 0
COUGH: 1
STRIDOR: 0
FEVER: 1
FACIAL SWELLING: 0
VOMITING: 0
SHORTNESS OF BREATH: 0
FATIGUE: 1
VOICE CHANGE: 0
NAUSEA: 0
EYE PAIN: 0
TREMORS: 0
WEAKNESS: 1

## 2021-08-27 ASSESSMENT — MIFFLIN-ST. JEOR: SCORE: 1541

## 2021-08-27 ASSESSMENT — PAIN SCALES - GENERAL: PAINLEVEL: MILD PAIN (3)

## 2021-08-27 NOTE — NURSING NOTE
Chief Complaint   Patient presents with     Cough     Pharyngitis     Patient is here for a sore throat, cough and fever that started last night. Patient would like a strep test.     Initial /66   Pulse 81   Temp (!) 102  F (38.9  C) (Tympanic)   Resp 18   Wt 76.4 kg (168 lb 8 oz)   SpO2 98%   BMI 22.85 kg/m   Estimated body mass index is 22.85 kg/m  as calculated from the following:    Height as of 6/12/20: 1.829 m (6').    Weight as of this encounter: 76.4 kg (168 lb 8 oz).  Medication Reconciliation: complete    Shalonda Miranda LPN

## 2021-08-27 NOTE — PATIENT INSTRUCTIONS
Patient Education     Self-Care for Sore Throats     Sore throats happen for many reasons, such as colds, allergies, cigarette smoke, air pollution, and infections caused by viruses or bacteria. In any case, your throat becomes red and sore. Your goal for self-care is to reduce your discomfort while giving your throat a chance to heal.  Moisten and soothe your throat  Tips include the following:    Try a sip of water first thing after waking up.    Keep your throat moist by drinking 6 or more glasses of clear liquids every day.    Run a cool-air humidifier in your room overnight.    Stay away from cigarette smoke.     Check the air quality index,if air pollution gives you a sore throat. On high pollution days, try to limit outdoor time.    Suck on throat lozenges, cough drops, hard candy, ice chips, or frozen fruit-juice bars. Use the sugar-free versions if your diet or medical condition requires them.  Gargle to ease irritation  Gargling every hour or 2 can ease irritation. Try gargling with 1 of these solutions:    1/4 teaspoon of salt in 1/2 cup of warm water    An over-the-counter anesthetic gargle  Use medicine for more relief  Over-the-counter medicine can reduce sore throat symptoms. Ask your pharmacist if you have questions about which medicine to use. To prevent possible medicine interactions, let the pharmacist know what medicines you take. To decrease symptoms:    Ease pain with anesthetic sprays. Aspirin or an aspirin substitute also helps. Remember, never give aspirin to anyone 18 or younger. Don't take aspirin if you are already taking blood thinners.     For sore throats caused by allergies, try antihistamines to block the allergic reaction.  Unless a sore throat is caused by a bacterial infection, antibiotics won t help you.  Prevent future sore throats  Prevention tips include:    Stop smoking or reduce contact with secondhand smoke. Smoke irritates the tender throat lining.    Limit contact with  pets and with allergy-causing substances, such as pollen and mold.    Wash your hands often when you re around someone with a sore throat or cold. This will keep viruses or bacteria from spreading.    Limit outdoor time when air pollution is bad.    Don t strain your vocal cords.  When to call your healthcare provider  Contact your healthcare provider if you have:    Fever of 100.4 F (38.0 C) or higher, or as directed by your healthcare provider    White spots on the throat    Great Trouble swallowing    A skin rash    Recent exposure to someone else with strep bacteria    Severe hoarseness and swollen glands in the neck or jaw  Call 911  Call 911 if any of the following occur:    Trouble breathing or catching your breath    Drooling and problems swallowing    Wheezing    Unable to talk    Feeling dizzy or faint    Feeling of doom  [employer] last reviewed this educational content on 9/1/2019 2000-2021 [employer]. All rights reserved. This information is not intended as a substitute for professional medical care. Always follow your healthcare professional's instructions.           Patient Education     Viral Upper Respiratory Illness (Adult)    You have a viral upper respiratory illness (URI), which is another term for the common cold. This illness is contagious during the first few days. It is spread through the air by coughing and sneezing. It may also be spread by direct contact (touching the sick person and then touching your own eyes, nose, or mouth). Frequent handwashing will decrease risk of spread. Most viral illnesses go away within 7 to 10 days with rest and simple home remedies. Sometimes the illness may last for several weeks. Antibiotics will not kill a virus, and they are generally not prescribed for this condition.  Home care    If symptoms are severe, rest at home for the first 2 to 3 days. When you resume activity, don't let yourself get too tired.    Don't smoke. If you need help  stopping, talk with your healthcare provider.    Avoid being exposed to cigarette smoke (yours or others ).    You may use acetaminophen or ibuprofen to control pain and fever, unless another medicine was prescribed. If you have chronic liver or kidney disease, have ever had a stomach ulcer or gastrointestinal bleeding, or are taking blood-thinning medicines, talk with your healthcare provider before using these medicines. Aspirin should never be given to anyone under 18 years of age who is ill with a viral infection or fever. It may cause severe liver or brain damage.    Your appetite may be poor, so a light diet is fine. Stay well hydrated by drinking 6 to 8 glasses of fluids per day (water, soft drinks, juices, tea, or soup). Extra fluids will help loosen secretions in the nose and lungs.    Over-the-counter cold medicines will not shorten the length of time you re sick, but they may be helpful for the following symptoms: cough, sore throat, and nasal and sinus congestion. If you take prescription medicines, ask your healthcare provider or pharmacist which over-the-counter medicines are safe to use. (Note: Don't use decongestants if you have high blood pressure.)  Follow-up care  Follow up with your healthcare provider, or as advised.  When to seek medical advice  Call your healthcare provider right away if any of these occur:    Cough with lots of colored sputum (mucus)    Severe headache; face, neck, or ear pain    Difficulty swallowing due to throat pain    Fever of 100.4 F (38 C) or higher, or as directed by your healthcare provider  Call 911  Call 911 if any of these occur:    Chest pain, shortness of breath, wheezing, or difficulty breathing    Coughing up blood    Very severe pain with swallowing, especially if it goes along with a muffled voice   [employer] last reviewed this educational content on 6/1/2018 2000-2021 [employer]. All rights reserved. This information is not intended as a  substitute for professional medical care. Always follow your healthcare professional's instructions.

## 2021-08-27 NOTE — PROGRESS NOTES
ASSESSMENT/PLAN:  1. Cough       2. Throat pain    - Group A Streptococcus PCR Throat Swab      Strep PCR was negative.       Discussed with patient that symptoms and exam are consistent with viral illness.  Discussed that symptomatic treatment is appropriate but not with antibiotics.      The patient reports that she is going to have a COVID test completed through the COVID testing clinic, as long as her strep is negative.     Symptomatic treatment - Encouraged fluids, salt water gargles, honey,  lozenges, etc     May use over-the-counter Tylenol or ibuprofen PRN    Discussed warning signs/symptoms indicative of need to f/u    Follow up if symptoms persist or worsen or concerns      I explained my diagnostic considerations and recommendations to the patient, who voiced understanding and agreement with the treatment plan. All questions were answered. We discussed potential side effects of any prescribed or recommended therapies, as well as expectations for response to treatments.        HPI:    Ann Lopez is a 41 year old female  who presents to [facility] today for a sore throat, non productive cough, temperature of 102 F during today's visit, rhinits and body aches. Denies any known sick contacts. Symptoms started with a sore throat last night. Denies taking any OTC medications.  The patient reports that she is currently breast feeding.     Past Medical History:   Diagnosis Date     Dysmenorrhea     2006     Labral tear of right hip joint      Other herpesviral infection     2004,genitial     Stress fracture     left hip     Vitamin D deficiency     10/5/2016     Past Surgical History:   Procedure Laterality Date     ARTHROSCOPY HIP Right 2018      SECTION N/A 2018    Procedure:  SECTION;;  Surgeon: Sona Newman MD;  Location: [facility]      SECTION, TUBAL LIGATION, COMBINED Bilateral 2021    Procedure: repeat  section with bilateral  salpingectomy;  Surgeon: Sona Newman MD;  Location:  OR     ENHANCE LASER REFRACTIVE NEW PT IN PARAMETERS 2012     Social History     Tobacco Use     Smoking status: Never Smoker     Smokeless tobacco: Never Used   Substance Use Topics     Alcohol use: No     Current Outpatient Medications   Medication Sig Dispense Refill     Misc. Devices (BREAST PUMP) MISC 1 each as needed (lactating mother) 1 each 0     Misc. Devices (BREAST PUMP) MISC 1 each daily 1 each 0     ibuprofen (ADVIL/MOTRIN) 600 MG tablet Take 1 tablet (600 mg) by mouth every 6 hours as needed for moderate pain (Patient not taking: Reported on 8/27/2021) 30 tablet 0     Prenatal MV-Min-Fe Fum-FA-DHA (PRENATAL 1 PO) Take 1 tablet by mouth (Patient not taking: Reported on 8/27/2021)       senna-docusate (SENOKOT-S/PERICOLACE) 8.6-50 MG tablet Take 1 tablet by mouth 2 times daily as needed for constipation (Patient not taking: Reported on 8/27/2021) 20 tablet 0     valACYclovir (VALTREX) 500 MG tablet Take 1 tablet (500 mg) by mouth 2 times daily (Patient not taking: Reported on 8/27/2021) 60 tablet 0     No Known Allergies      Past medical history, past surgical history, current medications and allergies reviewed and accurate to the best of my knowledge.        ROS:  Refer to HPI    /66   Pulse 81   Temp (!) 102  F (38.9  C) (Tympanic)   Resp 18   Wt 76.4 kg (168 lb 8 oz)   SpO2 98%   Breastfeeding Yes   BMI 22.85 kg/m      EXAM:  General Appearance: Well appearing female, appropriate appearance for age. No acute distress  Ears: Left TM intact, translucent with bony landmarks appreciated, no erythema, no effusion, no bulging, no purulence.  Right TM intact, translucent with bony landmarks appreciated, no erythema, no effusion, no bulging, no purulence.  Left auditory canal clear.  Right auditory canal clear.  Normal external ears, non tender.  Orophayrnx: moist mucous membranes, posterior pharynx with mild erythema, tonsils  without hypertrophy, mild erythema, no exudates or petechiae, no post nasal drip seen, no trismus, voice clear.    Neck: supple without adenopathy  Respiratory: normal chest wall and respirations.  Normal effort.  Clear to auscultation bilaterally, no wheezing, crackles or rhonchi.  No increased work of breathing.  No cough appreciated.  Cardiac: RRR with no murmurs  Psychological: normal affect, alert, oriented, and pleasant.

## 2021-08-28 NOTE — ED PROVIDER NOTES
History     Chief Complaint   Patient presents with     Fever     Muscle Pain     Covid Concern     HPI  Ann Lopez is a 41 year old female who is currently breast-feeding and her child is approximately 7 months old.  However she was diagnosed with Covid today.  She has been having high fever and a sore throat as well as a cough.  She has been using Motrin for fever reduction but upon arrival her temp is 102.  She has lots of questions about treating her symptoms with continuing to breast-feed.  Denies any other issues at this time.  She does not appear to be in respiratory distress.  Her SaO2 is 98% on room air.    Allergies:  No Known Allergies    Problem List:    Patient Active Problem List    Diagnosis Date Noted     Supervision of high risk pregnancy in third trimester 2021     Priority: Medium     History of  delivery, currently pregnant 2020     Priority: Medium     Added automatically from request for surgery [id number]       Patient is a currently breast-feeding mother 2018     Priority: Medium     S/P  section 2018     Priority: Medium     Labral tear of right hip joint 2018     Priority: Medium     Encounter for infertility counseling 10/08/2017     Priority: Medium     Vitamin D deficiency 10/05/2016     Priority: Medium        Past Medical History:    Past Medical History:   Diagnosis Date     Dysmenorrhea      Labral tear of right hip joint      Other herpesviral infection      Stress fracture 2000     Vitamin D deficiency        Past Surgical History:    Past Surgical History:   Procedure Laterality Date     ARTHROSCOPY HIP Right 2018      SECTION N/A 2018    Procedure:  SECTION;;  Surgeon: Sona Newman MD;  Location: [facility]      SECTION, TUBAL LIGATION, COMBINED Bilateral 2021    Procedure: repeat  section with bilateral salpingectomy;  Surgeon: Sona Newman MD;  Location: [facility]      ENHANCE LASER REFRACTIVE NEW PT IN PARAMETERS      2012       Family History:    Family History   Problem Relation Age of Onset     Family History Negative Mother         Good Health     Alcoholism Father         Alcoholism     Heart Disease Maternal Grandmother         Heart Disease     Emphysema Paternal Grandmother         Emphysema     Leukemia Paternal Grandfather         Leukemia     Thyroid Disease Sister         Thyroid Disease,borderline; infertility; 3 yrs younger     Thyroid Disease Other         Thyroid Disease,father's side of the family     Allergies Son      No Known Problems Son        Social History:  Marital Status:   [2]  Social History     Tobacco Use     Smoking status: Never Smoker     Smokeless tobacco: Never Used   Vaping Use     Vaping Use: Never used   Substance Use Topics     Alcohol use: No     Drug use: No     Comment: Drug use: No        Medications:    dextromethorphan (TUSSIN COUGH) 15 MG/5ML syrup  hydrOXYzine (VISTARIL) 25 MG capsule  ibuprofen (ADVIL/MOTRIN) 600 MG tablet  lidocaine (XYLOCAINE) 2 % solution  Misc. Devices (BREAST PUMP) MISC  Misc. Devices (BREAST PUMP) MISC  Prenatal MV-Min-Fe Fum-FA-DHA (PRENATAL 1 PO)  senna-docusate (SENOKOT-S/PERICOLACE) 8.6-50 MG tablet  valACYclovir (VALTREX) 500 MG tablet          Review of Systems   Constitutional: Positive for chills, fatigue and fever.   HENT: Positive for sore throat and trouble swallowing. Negative for facial swelling and voice change.    Eyes: Negative for pain.   Respiratory: Positive for cough. Negative for shortness of breath, wheezing and stridor.    Cardiovascular: Negative for chest pain.   Gastrointestinal: Negative for abdominal pain, nausea and vomiting.   Genitourinary: Negative for flank pain.   Musculoskeletal: Negative for back pain and neck pain.   Skin: Negative for pallor.   Neurological: Positive for weakness. Negative for tremors and seizures.   Psychiatric/Behavioral: Negative for agitation.    All other systems reviewed and are negative.      Physical Exam   BP: 104/66  Pulse: 87  Temp: (!) 102.9  F (39.4  C)  Resp: 18  Height: 182.9 cm (6')  Weight: 76.4 kg (168 lb 6.9 oz)  SpO2: 97 %      Physical Exam  Vitals and nursing note reviewed.   Constitutional:       General: She is not in acute distress.     Appearance: Normal appearance. She is not ill-appearing or toxic-appearing.   HENT:      Head: Normocephalic. No raccoon eyes, right periorbital erythema or left periorbital erythema.      Right Ear: No drainage or tenderness.      Left Ear: No drainage or tenderness.      Nose: Nose normal.   Eyes:      General: Lids are normal. Gaze aligned appropriately. No scleral icterus.     Extraocular Movements: Extraocular movements intact.   Neck:      Trachea: No tracheal deviation.   Cardiovascular:      Rate and Rhythm: Normal rate.   Pulmonary:      Effort: Pulmonary effort is normal. No respiratory distress.      Breath sounds: No stridor. No wheezing.      Comments: Lung sounds are clear SaO2 is 97% on room air.  She is not appear to be in respiratory distress.  No tachypnea.  Abdominal:      Tenderness: There is no abdominal tenderness.   Musculoskeletal:         General: No deformity or signs of injury. Normal range of motion.      Cervical back: Normal range of motion. No signs of trauma.   Skin:     General: Skin is warm and dry.      Coloration: Skin is not jaundiced or pale.   Neurological:      General: No focal deficit present.      Mental Status: She is alert and oriented to person, place, and time.      GCS: GCS eye subscore is 4. GCS verbal subscore is 5. GCS motor subscore is 6.      Motor: No tremor or seizure activity.   Psychiatric:         Attention and Perception: Attention normal.         Mood and Affect: Mood normal.         ED Course         Results for orders placed or performed in visit on 08/27/21 (from the past 24 hour(s))   Symptomatic COVID-19 Virus (Coronavirus) by PCR Nose     Specimen: Nose; Swab   Result Value Ref Range    SARS CoV2 PCR Positive (A) Negative    Narrative    Testing was performed using the Xpert Xpress SARS-CoV-2 Assay on the   Cepheid Gene-Xpert Instrument Systems. Additional information about   this Emergency Use Authorization (EUA) assay can be found via the Lab   Guide. This test should be ordered for the detection of SARS-CoV-2 in   individuals who meet SARS-CoV-2 clinical and/or epidemiological   criteria. Test performance is unknown in asymptomatic patients. This   test is for in vitro diagnostic use under the FDA EUA for   laboratories certified under CLIA to perform high complexity testing.   This test has not been FDA cleared or approved. A negative result   does not rule out the presence of PCR inhibitors in the specimen or   target RNA in concentration below the limit of detection for the   assay. The possibility of a false negative should be considered if   the patient's recent exposure or clinical presentation suggests   COVID-19. This test was validated by [facility] and [facility]. This laboratory is certified under the Clinic  al Laboratory Improvement Amendments (CLIA) as qualified to perform high complex  ity clinical laboratory testing.       Medications   acetaminophen (TYLENOL) tablet 1,000 mg (1,000 mg Oral Given 8/27/21 2029)       Assessments & Plan (with Medical Decision Making)     I have reviewed the nursing notes.    I have reviewed the findings, diagnosis, plan and need for follow up with the patient.      Discharge Medication List as of 8/27/2021  8:59 PM      START taking these medications    Details   dextromethorphan (TUSSIN COUGH) 15 MG/5ML syrup Take 5 mLs (15 mg) by mouth 4 times daily as needed for cough, Disp-118 mL, R-1, Local Print      hydrOXYzine (VISTARIL) 25 MG capsule Take 1 capsule (25 mg) by mouth nightly as needed for itching or other (INSOMNIA), Disp-15 capsule, R-0, Local Print       lidocaine (XYLOCAINE) 2 % solution Swish and swallow 10 mLs in mouth every 3 hours as needed for moderate pain ; Max 8 doses/24 hour period., Disp-200 mL, R-0, Local Print             Final diagnoses:   Clinical diagnosis of COVID-19   Sore throat due to virus   Persistent insomnia   Mother currently breast-feeding     Febrile temp is 102.9.  She has been using Motrin.  She was given Tylenol orally in the ER.  She has been having difficulty sleeping and a persistent cough with sore throat.  She is wondering if there is anything she can take for the symptoms that will not affect her breast-feeding.  I reviewed all these medicines from Tessalon cough syrup, hydroxyzine capsules, and viscous lidocaine with the on-call pharmacist.  No contraindications and no signs of detriment to the baby or mother.  Reassurance was given.  I discussed continued symptomatic support and return if there is any concerns for further evaluation as needed.  Rx for Tessalon cough syrup, hydroxyzine capsules for insomnia, and viscous lidocaine.  She will alternate Tylenol and ibuprofen every 2 hours for fever reduction.  Again return if there is any concerns for further evaluation as needed  8/27/2021   [facility] AND [facility]     Kirk Godinez PA-C  08/27/21 2201

## 2021-08-28 NOTE — ED TRIAGE NOTES
ED Nursing Triage Note (General)   ________________________________    Ann Lopez is a 41 year old Female that presents to triage private car  With history of  Muscle aches, test positive for Covid this am, pt felt like she should be seen before she takes a turn a worse, unable to sleep, fever, no vaccine  reported by patient   Significant symptoms had onset 3 day(s) ago.    Patient appears alert , in moderate distress., and cooperative behavior.    GCS Total = 15  Airway: intact  Breathing noted as Normal  Circulation Normal  Skin:  Normal  Action taken:  Triage to critical care immediately      PRE HOSPITAL PRIOR LIVING SITUATION Spouse and Children

## 2021-10-09 ENCOUNTER — HEALTH MAINTENANCE LETTER (OUTPATIENT)
Age: 41
End: 2021-10-09

## 2022-01-30 NOTE — PROGRESS NOTES
Nursing Notes:   Shila Gupta MA  2/4/2022 10:21 AM  Signed  Chief Complaint   Patient presents with     Physical     Patient is here for annual physical. She has concerns about possible gallbladder issues.     Initial /70 (BP Location: Right arm, Patient Position: Sitting, Cuff Size: Adult Regular)   Pulse 73   Temp 98.8  F (37.1  C) (Tympanic)   Resp 16   Wt 76.5 kg (168 lb 9.6 oz)   LMP 02/01/2022   SpO2 100%   Breastfeeding No   BMI 22.87 kg/m   Estimated body mass index is 22.87 kg/m  as calculated from the following:    Height as of 8/27/21: 1.829 m (6').    Weight as of this encounter: 76.5 kg (168 lb 9.6 oz).  Medication Reconciliation: complete    Shila Gupta MA       FOOD SECURITY SCREENING QUESTIONS:    The next two questions are to help us understand your food security.  If you are feeling you need any assistance in this area, we have resources available to support you today.    Hunger Vital Signs:  Within the past 12 months we worried whether our food would run out before we got money to buy more. Never  Within the past 12 months the food we bought just didn't last and we didn't have money to get more. Never  Shila Gupta MA,LPN on 2/4/2022 at 10:11 AM         SUBJECTIVE:    Ann Lopez is a 41 year old female who presents for her annual exam.    HPI: Ann Lopez is a 41 year old female presents for her annual exam.    For the past 10 years she's had RUQ pain - starts dull and then gets worse.  In particular this is with not eating/not having breakfast.    +FH of gb disease.    Last month, progressed to vomiting.  More consistent this past year.  Felt better after vomiting.  Did not occur with being pregnant.      Pap smear done 2017      Immunizations:  Not up to date (flu, COVID, boostrix)   Mammogram at age 40-45; no previous on file ; stopped breast feeding in December   Colon cancer screening at age 45  Current birth control tubal ligation     No results found for:  CHOL  No results found for: HDL  No results found for: LDL  No results found for: TRIG        PROBLEM LIST:  Patient Active Problem List   Diagnosis     Encounter for infertility counseling     Vitamin D deficiency     S/P  section     Labral tear of right hip joint     Patient is a currently breast-feeding mother     History of  delivery, currently pregnant     Supervision of high-risk pregnancy of elderly primigravida       PAST MEDICAL HISTORY:  Past Medical History:   Diagnosis Date     Dysmenorrhea     2006     Labral tear of right hip joint      Other herpesviral infection     2004,genitial     Stress fracture     left hip     Vitamin D deficiency     10/5/2016     SURGICAL HISTORY:  Past Surgical History:   Procedure Laterality Date     ARTHROSCOPY HIP Right 2018      SECTION N/A 2018    Procedure:  SECTION;;  Surgeon: Sona Newman MD;  Location:  OR      SECTION, TUBAL LIGATION, COMBINED Bilateral 2021    Procedure: repeat  section with bilateral salpingectomy;  Surgeon: Sona Newman MD;  Location:  OR     ENHANCE LASER REFRACTIVE NEW PT IN 2012       SOCIAL HISTORY:  Social History     Socioeconomic History     Marital status:      Spouse name: Ralf     Number of children: 2     Years of education: 12+     Highest education level: Not on file   Occupational History     Occupation: Massage therapy     Employer: SELF   Tobacco Use     Smoking status: Never Smoker     Smokeless tobacco: Never Used   Vaping Use     Vaping Use: Never used   Substance and Sexual Activity     Alcohol use: Not Currently     Drug use: No     Sexual activity: Not Currently     Partners: Male     Birth control/protection: Female Surgical     Comment: tubal   Other Topics Concern     Parent/sibling w/ CABG, MI or angioplasty before 65F 55M? 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    Son Devin    Son: 2021 - Angelo     Social Determinants of Health     Financial Resource Strain: Low Risk      Difficulty of Paying Living Expenses: Not hard at all   Food Insecurity: No Food Insecurity     Worried About Running Out of Food in the Last Year: Never true     Ran Out of Food in the Last Year: Never true   Transportation Needs: No Transportation Needs     Lack of Transportation (Medical): No     Lack of Transportation (Non-Medical): No   Physical Activity: Not on file   Stress: Not on file   Social Connections: Not on file   Intimate Partner Violence: Not on file   Housing Stability: Not on file     FAMILY HISTORY:    Family History   Problem Relation Age of Onset     Arrhythmia Mother      Alcoholism Father         Alcoholism     Thyroid Disease Sister         Thyroid Disease,borderline; infertility; 3 yrs younger     Heart Disease Maternal Grandmother         Heart Disease     Emphysema Paternal Grandmother         Emphysema     Leukemia Paternal Grandfather         Leukemia     Allergies Son      Atopic Dermatitis Son      No Known Problems Son      Thyroid Disease Other         Thyroid Disease,father's side of the family       CURRENT MEDICATIONS:   Current Outpatient Medications   Medication Sig Dispense Refill     hydrOXYzine (VISTARIL) 25 MG capsule Take 1 capsule (25 mg) by mouth nightly as needed for itching or other (INSOMNIA) (Patient not taking: Reported on 2/4/2022) 15 capsule 0     valACYclovir (VALTREX) 500 MG tablet Take 1 tablet (500 mg) by mouth 2 times daily (Patient not taking: Reported on 8/27/2021) 60 tablet 0     ALLERGIES:   No Known Allergies      REVIEW OF SYSTEMS:  General: denies any general problems.  Eyes: denies problems  Ears/Nose/Throat: denies problems, last dentist visit was in September  Has invisilign   Respiratory: denies problems  Cardiovascular: denies  problems  Gastrointestinal: see above   Genitourinary: periods - only #2 since breast feeding stopped   Patient's last menstrual period was 02/01/2022.   Musculoskeletal: denies problems - hips are good  Skin: denies problems  Neurologic: denies problems  Psychiatric: denies problems  Endocrine: denies problems  Heme/Lymphatic: denies problems  Allergic/Immunologic: denies problems      PHQ-2 Score:     PHQ-2 ( 1999 Pfizer) 2/3/2022 2/3/2022   Q1: Little interest or pleasure in doing things 0 -   Q2: Feeling down, depressed or hopeless 0 -   PHQ-2 Score 0 -   PHQ-2 Total Score (12-17 Years)- Positive if 3 or more points; Administer PHQ-A if positive - -   Q1: Little interest or pleasure in doing things Not at all Not at all   Q2: Feeling down, depressed or hopeless Not at all Not at all   PHQ-2 Score 0 0       PHQ-9 SCORE 6/2/2020   PHQ-9 Total Score 0     No flowsheet data found.            OBJECTIVE:  /70 (BP Location: Right arm, Patient Position: Sitting, Cuff Size: Adult Regular)   Pulse 73   Temp 98.8  F (37.1  C) (Tympanic)   Resp 16   Wt 76.5 kg (168 lb 9.6 oz)   LMP 02/01/2022   SpO2 100%   Breastfeeding No   BMI 22.87 kg/m     Patient's last menstrual period was 02/01/2022.    Wt Readings from Last 5 Encounters:   02/04/22 76.5 kg (168 lb 9.6 oz)   08/27/21 76.4 kg (168 lb 6.9 oz)   08/27/21 76.4 kg (168 lb 8 oz)   02/26/21 79.4 kg (175 lb)   01/29/21 78 kg (172 lb)         EXAM:   General Appearance: Pleasant, alert, appropriate appearance for age. No acute distress  Head Exam: Normal. Normocephalic, atraumatic.  Eye Exam:Normal external eye, conjunctiva, lids, cornea. ROBERTO.  Ear Exam: Normal TM's bilaterally. Normal auditory canals and external ears. Non-tender.  Neck Exam:  Supple, no masses or nodes.  Thyroid Exam: No nodules or enlargement.  Chest/Respiratory Exam: Normal chest wall andrespirations. Clear to auscultation.  Breast Exam: No dimpling, nipple retraction or discharge. No  masses or nodes.  Cardiovascular Exam: Regular rate and rhythm. S1, S2, no murmur, click, gallop, or rubs.  Gastrointestinal Exam: Soft, non-tender, no masses or organomegaly  Genitourinary Exam Female: External genitalia, vulva and vagina appear normal. Bleeding due to menses.  Pap smear obtained   Musculoskeletal Exam: Back is straight and non-tender, full ROM of upper and lower extremities.  Skin: no rash or abnormalities; benign-appearing moles upper back  Neurologic Exam: Nonfocal, symmetric DTRs, normal gross motor, tone coordination and no tremor.  Psychiatric Exam: Alert and oriented - appropriate affect.    Allied Health/Nurse Visit on 08/27/2021   Component Date Value Ref Range Status     SARS CoV2 PCR 08/27/2021 Positive* Negative Final    POSITIVE: SARS-CoV-2 (COVID-19) RNA detected, presumed positive.       ASSESSMENT/PLAN:      ICD-10-CM    1. Physical exam, annual  Z00.00 Pap Screen with HPV - recommended age 30 - 65 years   2. Right upper quadrant pain  R10.11 US Abdomen Complete     Comprehensive Metabolic Panel     Comprehensive Metabolic Panel   3. Vitamin D deficiency  E55.9 Vitamin D Total     Vitamin D Total   4. Family history of thyroid disease  Z83.49 TSH     TSH        1. Differential diagnosis of her right upper quadrant pain symptoms reviewed.  We will start with obtaining labs, gallbladder ultrasound.  If ultrasound unremarkable, consider HIDA scan.  2. Recommend mammogram this summer when it has been at least 6 months since breast-feeding.  3. Pap smear updated today.  4. Additional labs obtained today include vitamin D, thyroid testing.      PDMP Review     None          Tanesha Johnson MD       Answers for HPI/ROS submitted by the patient on 2/3/2022  Frequency of exercise:: 4-5 days/week  Getting at least 3 servings of Calcium per day:: Yes  Diet:: Gluten-free/reduced  Taking medications regularly:: Yes  Medication side effects:: Not applicable  Bi-annual eye exam::  Yes  Dental care twice a year:: Yes  Sleep apnea or symptoms of sleep apnea:: None  Additional concerns today:: Yes  Duration of exercise:: 45-60 minutes

## 2022-02-03 PROBLEM — O09.519 SUPERVISION OF HIGH-RISK PREGNANCY OF ELDERLY PRIMIGRAVIDA: Status: ACTIVE | Noted: 2018-02-07

## 2022-02-04 ENCOUNTER — OFFICE VISIT (OUTPATIENT)
Dept: FAMILY MEDICINE | Facility: OTHER | Age: 42
End: 2022-02-04
Attending: FAMILY MEDICINE
Payer: COMMERCIAL

## 2022-02-04 VITALS
TEMPERATURE: 98.8 F | BODY MASS INDEX: 22.87 KG/M2 | DIASTOLIC BLOOD PRESSURE: 70 MMHG | HEART RATE: 73 BPM | WEIGHT: 168.6 LBS | SYSTOLIC BLOOD PRESSURE: 102 MMHG | OXYGEN SATURATION: 100 % | RESPIRATION RATE: 16 BRPM

## 2022-02-04 DIAGNOSIS — Z83.49 FAMILY HISTORY OF THYROID DISEASE: ICD-10-CM

## 2022-02-04 DIAGNOSIS — R10.11 RIGHT UPPER QUADRANT PAIN: ICD-10-CM

## 2022-02-04 DIAGNOSIS — E55.9 VITAMIN D DEFICIENCY: ICD-10-CM

## 2022-02-04 DIAGNOSIS — Z00.00 PHYSICAL EXAM, ANNUAL: Primary | ICD-10-CM

## 2022-02-04 LAB
ALBUMIN SERPL-MCNC: 4.1 G/DL (ref 3.5–5.7)
ALP SERPL-CCNC: 48 U/L (ref 34–104)
ALT SERPL W P-5'-P-CCNC: 9 U/L (ref 7–52)
ANION GAP SERPL CALCULATED.3IONS-SCNC: 5 MMOL/L (ref 3–14)
AST SERPL W P-5'-P-CCNC: 11 U/L (ref 13–39)
BILIRUB SERPL-MCNC: 0.4 MG/DL (ref 0.3–1)
BUN SERPL-MCNC: 20 MG/DL (ref 7–25)
CALCIUM SERPL-MCNC: 9.1 MG/DL (ref 8.6–10.3)
CHLORIDE BLD-SCNC: 109 MMOL/L (ref 98–107)
CO2 SERPL-SCNC: 25 MMOL/L (ref 21–31)
CREAT SERPL-MCNC: 1.04 MG/DL (ref 0.6–1.2)
DEPRECATED CALCIDIOL+CALCIFEROL SERPL-MC: 34 UG/L (ref 30–100)
GFR SERPL CREATININE-BSD FRML MDRD: 69 ML/MIN/1.73M2
GLUCOSE BLD-MCNC: 84 MG/DL (ref 70–105)
POTASSIUM BLD-SCNC: 3.9 MMOL/L (ref 3.5–5.1)
PROT SERPL-MCNC: 6.4 G/DL (ref 6.4–8.9)
SODIUM SERPL-SCNC: 139 MMOL/L (ref 134–144)
TSH SERPL DL<=0.005 MIU/L-ACNC: 1.02 MU/L (ref 0.4–4)

## 2022-02-04 PROCEDURE — 82306 VITAMIN D 25 HYDROXY: CPT | Mod: ZL | Performed by: FAMILY MEDICINE

## 2022-02-04 PROCEDURE — 84443 ASSAY THYROID STIM HORMONE: CPT | Mod: ZL | Performed by: FAMILY MEDICINE

## 2022-02-04 PROCEDURE — 80053 COMPREHEN METABOLIC PANEL: CPT | Mod: ZL | Performed by: FAMILY MEDICINE

## 2022-02-04 PROCEDURE — 36415 COLL VENOUS BLD VENIPUNCTURE: CPT | Mod: ZL | Performed by: FAMILY MEDICINE

## 2022-02-04 PROCEDURE — G0123 SCREEN CERV/VAG THIN LAYER: HCPCS | Performed by: FAMILY MEDICINE

## 2022-02-04 PROCEDURE — 87624 HPV HI-RISK TYP POOLED RSLT: CPT | Mod: ZL | Performed by: FAMILY MEDICINE

## 2022-02-04 PROCEDURE — 99396 PREV VISIT EST AGE 40-64: CPT | Performed by: FAMILY MEDICINE

## 2022-02-04 ASSESSMENT — PAIN SCALES - GENERAL: PAINLEVEL: NO PAIN (0)

## 2022-02-04 NOTE — NURSING NOTE
Chief Complaint   Patient presents with     Physical     Patient is here for annual physical. She has concerns about possible gallbladder issues.     Initial /70 (BP Location: Right arm, Patient Position: Sitting, Cuff Size: Adult Regular)   Pulse 73   Temp 98.8  F (37.1  C) (Tympanic)   Resp 16   Wt 76.5 kg (168 lb 9.6 oz)   LMP 02/01/2022   SpO2 100%   Breastfeeding No   BMI 22.87 kg/m   Estimated body mass index is 22.87 kg/m  as calculated from the following:    Height as of 8/27/21: 1.829 m (6').    Weight as of this encounter: 76.5 kg (168 lb 9.6 oz).  Medication Reconciliation: complete    Shila Gupta MA       FOOD SECURITY SCREENING QUESTIONS:    The next two questions are to help us understand your food security.  If you are feeling you need any assistance in this area, we have resources available to support you today.    Hunger Vital Signs:  Within the past 12 months we worried whether our food would run out before we got money to buy more. Never  Within the past 12 months the food we bought just didn't last and we didn't have money to get more. Never  Shila Gupta MA,LPN on 2/4/2022 at 10:11 AM

## 2022-02-10 ENCOUNTER — HOSPITAL ENCOUNTER (OUTPATIENT)
Dept: ULTRASOUND IMAGING | Facility: OTHER | Age: 42
Discharge: HOME | End: 2022-02-10
Attending: FAMILY MEDICINE | Admitting: FAMILY MEDICINE
Payer: COMMERCIAL

## 2022-02-10 DIAGNOSIS — R10.11 RIGHT UPPER QUADRANT PAIN: Primary | ICD-10-CM

## 2022-02-10 DIAGNOSIS — R10.11 RIGHT UPPER QUADRANT PAIN: ICD-10-CM

## 2022-02-10 PROCEDURE — 76700 US EXAM ABDOM COMPLETE: CPT

## 2022-02-14 LAB
BKR LAB AP GYN ADEQUACY: ABNORMAL
BKR LAB AP GYN INTERPRETATION: ABNORMAL
BKR LAB AP HPV REFLEX: ABNORMAL
BKR LAB AP PREVIOUS ABNORMAL: ABNORMAL
PATH REPORT.COMMENTS IMP SPEC: ABNORMAL
PATH REPORT.COMMENTS IMP SPEC: ABNORMAL
PATH REPORT.RELEVANT HX SPEC: ABNORMAL

## 2022-02-15 LAB
HUMAN PAPILLOMA VIRUS 16 DNA: NEGATIVE
HUMAN PAPILLOMA VIRUS 18 DNA: NEGATIVE
HUMAN PAPILLOMA VIRUS FINAL DIAGNOSIS: NORMAL
HUMAN PAPILLOMA VIRUS OTHER HR: NEGATIVE

## 2022-03-07 NOTE — LACTATION NOTE
This note was copied from a baby's chart.  INPATIENT LACTATION CONSULT      Consult with Ann and bienvenido regarding breastfeeding.  Ann nursed her first child who is now 2 years old for 11  months with no problems. Ann states she notices obvious rooting with a strong latch during feedings.  Rhythmic and aggressive suckling also noted.  Instructed Ann on correct positioning and technique when latching babe on.  Ann is independent with latching babe onto breast.  Minimal assistance required.  Encouraged Ann on the importance of frequent feedings throughout the day (at least 8-12 feedings in a 24 hour period) and skin to skin contact.  Ann demonstrated and states she understands all information given.    Shalonda Cedeno RN, IBCLC  Lactation Consultant  [facility]   0

## 2022-03-11 ENCOUNTER — HOSPITAL ENCOUNTER (OUTPATIENT)
Dept: NUCLEAR MEDICINE | Facility: OTHER | Age: 42
Setting detail: NUCLEAR MEDICINE
Discharge: HOME | End: 2022-03-11
Attending: FAMILY MEDICINE | Admitting: FAMILY MEDICINE
Payer: COMMERCIAL

## 2022-03-11 DIAGNOSIS — R10.11 RIGHT UPPER QUADRANT PAIN: ICD-10-CM

## 2022-03-11 PROCEDURE — 78227 HEPATOBIL SYST IMAGE W/DRUG: CPT

## 2022-03-11 PROCEDURE — A9537 TC99M MEBROFENIN: HCPCS | Performed by: FAMILY MEDICINE

## 2022-03-11 PROCEDURE — 343N000001 HC RX 343: Performed by: FAMILY MEDICINE

## 2022-03-11 RX ORDER — KIT FOR THE PREPARATION OF TECHNETIUM TC 99M MEBROFENIN 45 MG/10ML
5.44 INJECTION, POWDER, LYOPHILIZED, FOR SOLUTION INTRAVENOUS ONCE
Status: COMPLETED | OUTPATIENT
Start: 2022-03-11 | End: 2022-03-11

## 2022-03-11 RX ADMIN — MEBROFENIN 5.44 MILLICURIE: 45 INJECTION, POWDER, LYOPHILIZED, FOR SOLUTION INTRAVENOUS at 07:15

## 2022-03-13 DIAGNOSIS — K81.1 CHRONIC CHOLECYSTITIS: Primary | ICD-10-CM

## 2022-06-01 ENCOUNTER — OFFICE VISIT (OUTPATIENT)
Dept: FAMILY MEDICINE | Facility: OTHER | Age: 42
End: 2022-06-01
Attending: FAMILY MEDICINE
Payer: COMMERCIAL

## 2022-06-01 ENCOUNTER — HOSPITAL ENCOUNTER (OUTPATIENT)
Dept: GENERAL RADIOLOGY | Facility: OTHER | Age: 42
Discharge: HOME | End: 2022-06-01
Attending: FAMILY MEDICINE
Payer: COMMERCIAL

## 2022-06-01 VITALS
BODY MASS INDEX: 23.33 KG/M2 | WEIGHT: 172 LBS | HEART RATE: 76 BPM | OXYGEN SATURATION: 100 % | DIASTOLIC BLOOD PRESSURE: 78 MMHG | TEMPERATURE: 96.9 F | RESPIRATION RATE: 14 BRPM | SYSTOLIC BLOOD PRESSURE: 112 MMHG

## 2022-06-01 DIAGNOSIS — E65 FAT PAD SYNDROME: Primary | ICD-10-CM

## 2022-06-01 PROCEDURE — 73630 X-RAY EXAM OF FOOT: CPT | Mod: LT

## 2022-06-01 PROCEDURE — 99214 OFFICE O/P EST MOD 30 MIN: CPT | Performed by: FAMILY MEDICINE

## 2022-06-01 ASSESSMENT — PAIN SCALES - GENERAL: PAINLEVEL: EXTREME PAIN (9)

## 2022-06-01 NOTE — PROGRESS NOTES
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  Her pain has been present since 4/15/2022.  She feels like it may have started during a workout which she was jumping rope.  She is not entirely sure.  In the subsequent days following she had significant heel pain, especially early in the morning.  This has persisted.  Currently she describes her pain as a sharp 9/10 pain.  She has pain with running and jumping activities.  She has tried stretching and foam rolling.      EXAM:  /78 (BP Location: Right arm, Patient Position: Sitting, Cuff Size: Adult Regular)   Pulse 76   Temp 96.9  F (36.1  C) (Tympanic)   Resp 14   Wt 78 kg (172 lb)   SpO2 100%   BMI 23.33 kg/m    MUSCULOSKELETAL EXAM:  LEFT FOOT AND ANKLE  Inspection:  -No gross deformity  -No bruising or swelling  -Scars:  None    Tenderness to palpation of the:  -Fibular head:  Negative  -Fibular shaft:  Negative  -Tibial shaft:  Negative  -Medial malleolus:  Negative  -Deltoid ligament:  Negative  -Lateral malleolus:  Negative  -ATFL:  Negative  -CFL:  Negative  -PTFL:  Negative  -Syndesmosis (AITFL):  Negative  -Midsubstance Achilles tendon:  Negative  -Achilles tendon insertion:  Negative  -Plantar fascial origin on the calcaneus:  Negative  - Heel fat pad: Positive  -Base of the fifth metatarsal:  Negative  -Navicular:  Negative  -Lisfranc joint:  Negative  -Metatarsals:  Negative    Range of Motion:  -Passive plantarflexion:  80  -Passive dorsiflexion:  15    Strength:  -Dorsiflexion:  5/5  -Plantarflexion:  5/5  -Inversion:  5/5  -Eversion:  5/5    Other:  -Intact sensation to light touch distally.  -No signs of cyanosis. Normal skin temperature.  -Knee:  No gross deformity. Normal motion.  -Right foot/ankle:  No gross deformity. No palpable tenderness. Normal strength.      IMAGIN2022: 3 view right foot x-ray  - No fracture, dislocation, or bony lesion.  Mild hallux  varus deformity at the first MTP joint.      ASSESSMENT/PLAN:  Diagnoses and all orders for this visit:  Fat pad syndrome  -     XR Foot Left G/E 3 Views    41-year-old female with left heel fat pad syndrome.  X-rays were performed in the office today and personally reviewed by me with the findings as demonstrated above by my interpretation.  We discussed treatment options to include rest, crosstraining, foam rolling, physical therapy, NSAIDs, footwear modification, activity modification, heel cups, injections, and surgery.  Injections and surgery should be considered only as a last resort.  - Discussed modifying activities  - Discussed modifying footwear  - Prescription written for heel cups through [facility]  - Aleve 440 mg twice daily x7 days then as needed thereafter  - Cross train for 2-4 weeks then begin graduated return to running activities  - Follow-up as needed  - If patient symptoms or not improving with the above interventions she may call for a formal referral to PT      Valdez De Jesus MD  6/1/2022  7:45 AM    Total time spent with this patient was 38 minutes which included chart review, visualization and independent interpretation of images, time spent with the patient, and documentation.    Procedure time:  0 minute(s)

## 2022-06-01 NOTE — NURSING NOTE
Chief Complaint   Patient presents with     Pain     Left heel pain, ongoing 4/15/22     Patient presents for left heel pain ongoing since 4/15/22. Possibly injured while jump roping. Pain 9/10.     Initial /78 (BP Location: Right arm, Patient Position: Sitting, Cuff Size: Adult Regular)   Pulse 76   Temp 96.9  F (36.1  C) (Tympanic)   Resp 14   Wt 78 kg (172 lb)   SpO2 100%   BMI 23.33 kg/m   Estimated body mass index is 23.33 kg/m  as calculated from the following:    Height as of 8/27/21: 1.829 m (6').    Weight as of this encounter: 78 kg (172 lb).       Medication Reconciliation: Complete    Cathy Olivia LPN .......  6/1/2022  7:51 AM

## 2022-08-23 NOTE — H&P
[facility] Labor and Delivery History and Physical    Ann Lopez MRN# [id number]   Age: 38 year old YOB: 1980     Date of Admission:  2018    Primary care provider: Tanesha Hawk           Chief Complaint:   Ann Lopez is a 38 year old  at 40w0d by LMP c/w 6w6d US admitted for IOL for advanced maternal age. She reports some contractions, no VB or LOF. +FM.          Pregnancy history:     OBSTETRIC HISTORY:    Obstetric History       T0      L0     SAB0   TAB0   Ectopic0   Multiple0   Live Births0       # Outcome Date GA Lbr Garcia/2nd Weight Sex Delivery Anes PTL Lv   1 Current                   EDC: Estimated Date of Delivery: Aug 29, 2018    Prenatal Labs: Lab Results   Component Value Date    HGB 13.2 2018       GBS Status: negative    Active Problem List  Patient Active Problem List   Diagnosis     Encounter for infertility counseling     Vitamin D deficiency     Supervision of high-risk pregnancy of elderly primigravida     Advanced maternal age, 1st pregnancy, third trimester       Medication Prior to Admission  Prescriptions Prior to Admission   Medication Sig Dispense Refill Last Dose     acyclovir (ZOVIRAX) 400 MG tablet Take 1 tablet (400 mg) by mouth 2 times daily 80 tablet 0 Taking     Misc. Devices (BREAST PUMP) MISC Reason for need: breastfeeding. Length of need: 1 year 1 each 0 Taking     Omega-3 Fatty Acids (FISH OIL PO) Take 1 capsule by mouth daily   Taking     Prenatal Vit-Fe Fumarate-FA ([employer] PRENATAL) 28-0.8 MG TABS Take 1 tablet by mouth daily   Taking   .        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 Plan: known vasculopath, recent CABG. under care of cardiology, recommend carotid eval. no overt signs of OIS. RTC 1 yr                        Family History:     Family History   Problem Relation Age of Onset     Family History Negative Mother      Good Health     Alcoholism Father      Alcoholism     HEART DISEASE Maternal Grandmother      Heart Disease     Emphysema Paternal Grandmother      Emphysema     Leukemia Paternal Grandfather      Leukemia     Thyroid Disease Sister      Thyroid Disease,borderline; infertility; 3 yrs younger     Thyroid Disease Other      Thyroid Disease,father's side of the family             Social History:     Social History   Substance Use Topics     Smoking status: Never Smoker     Smokeless tobacco: Never Used     Alcohol use No            Review of Systems:   The Review of Systems is negative other than noted in the HPI          Physical Exam:   Patient Vitals for the past 8 hrs:   BP Temp Heart Rate Resp   18 1635 - 97.7  F (36.5  C) - -   18 1630 121/81 - 78 16     Gen: resting in bed, NAD  CV: RRR  Resp: CTAB  Abd: gravid, soft, nontender  Ext: nontender  Pelvic: normal external genitalia, vagina and cervix. No evidence of lesions. Cook catheter placed under direct visualization with 60cc instilled into intrauterine balloon.    Cervix: closed/long/-2  Membranes: intact  EFW: 7-7.5 lbs  Presentation: Cephalic by US    Fetal Heart Rate Tracing: discontinuous tracing, 130, mod variability, + accels, no decels  Tocometer: quiet        Assessment:   Ann Lopez is a 38 year old  at 40w0d admitted for IOL for advanced maternal age.          Plan:   FWB: Cat I, reactive. EFW 7-7.5 lbs  Labor: cervical ripening with cook catheter + oral misoprostol q2h  Pain: per patient request  Rh positive, RI  GBS negative  Anticipate     Sona Newman MD

## 2022-10-19 ENCOUNTER — MYC MEDICAL ADVICE (OUTPATIENT)
Dept: FAMILY MEDICINE | Facility: OTHER | Age: 42
End: 2022-10-19

## 2022-10-19 DIAGNOSIS — K81.1 CHRONIC CHOLECYSTITIS: Primary | ICD-10-CM

## 2022-11-18 ENCOUNTER — HOSPITAL ENCOUNTER (OUTPATIENT)
Dept: NUCLEAR MEDICINE | Facility: OTHER | Age: 42
Setting detail: NUCLEAR MEDICINE
Discharge: HOME | End: 2022-11-18
Attending: FAMILY MEDICINE | Admitting: FAMILY MEDICINE
Payer: COMMERCIAL

## 2022-11-18 DIAGNOSIS — K81.1 CHRONIC CHOLECYSTITIS: ICD-10-CM

## 2022-11-18 PROCEDURE — 78227 HEPATOBIL SYST IMAGE W/DRUG: CPT

## 2022-11-18 PROCEDURE — 343N000001 HC RX 343: Performed by: FAMILY MEDICINE

## 2022-11-18 PROCEDURE — A9537 TC99M MEBROFENIN: HCPCS | Performed by: FAMILY MEDICINE

## 2022-11-18 RX ORDER — KIT FOR THE PREPARATION OF TECHNETIUM TC 99M MEBROFENIN 45 MG/10ML
5.34 INJECTION, POWDER, LYOPHILIZED, FOR SOLUTION INTRAVENOUS ONCE
Status: COMPLETED | OUTPATIENT
Start: 2022-11-18 | End: 2022-11-18

## 2022-11-18 RX ADMIN — MEBROFENIN 5.34 MILLICURIE: 45 INJECTION, POWDER, LYOPHILIZED, FOR SOLUTION INTRAVENOUS at 07:00

## 2024-01-16 ENCOUNTER — OFFICE VISIT (OUTPATIENT)
Dept: FAMILY MEDICINE | Facility: OTHER | Age: 44
End: 2024-01-16
Attending: FAMILY MEDICINE
Payer: COMMERCIAL

## 2024-01-16 VITALS
RESPIRATION RATE: 16 BRPM | DIASTOLIC BLOOD PRESSURE: 78 MMHG | BODY MASS INDEX: 23.7 KG/M2 | HEART RATE: 77 BPM | WEIGHT: 175 LBS | OXYGEN SATURATION: 99 % | HEIGHT: 72 IN | TEMPERATURE: 98.6 F | SYSTOLIC BLOOD PRESSURE: 116 MMHG

## 2024-01-16 DIAGNOSIS — K81.1 CHRONIC CHOLECYSTITIS: ICD-10-CM

## 2024-01-16 DIAGNOSIS — Z00.00 PHYSICAL EXAM, ANNUAL: Primary | ICD-10-CM

## 2024-01-16 DIAGNOSIS — Z83.49 FAMILY HISTORY OF THYROID DISEASE: ICD-10-CM

## 2024-01-16 DIAGNOSIS — E55.9 VITAMIN D DEFICIENCY: ICD-10-CM

## 2024-01-16 LAB
T4 FREE SERPL-MCNC: 1.1 NG/DL (ref 0.9–1.7)
TSH SERPL DL<=0.005 MIU/L-ACNC: 1.49 UIU/ML (ref 0.3–4.2)

## 2024-01-16 PROCEDURE — 84439 ASSAY OF FREE THYROXINE: CPT | Mod: ZL | Performed by: FAMILY MEDICINE

## 2024-01-16 PROCEDURE — 84443 ASSAY THYROID STIM HORMONE: CPT | Mod: ZL | Performed by: FAMILY MEDICINE

## 2024-01-16 PROCEDURE — 99396 PREV VISIT EST AGE 40-64: CPT | Performed by: FAMILY MEDICINE

## 2024-01-16 PROCEDURE — 36415 COLL VENOUS BLD VENIPUNCTURE: CPT | Mod: ZL | Performed by: FAMILY MEDICINE

## 2024-01-16 PROCEDURE — 82306 VITAMIN D 25 HYDROXY: CPT | Mod: ZL | Performed by: FAMILY MEDICINE

## 2024-01-16 ASSESSMENT — ENCOUNTER SYMPTOMS
EYE PAIN: 0
PARESTHESIAS: 0
NERVOUS/ANXIOUS: 0
HEMATOCHEZIA: 0
HEMATURIA: 0
WEAKNESS: 0
CONSTIPATION: 0
PALPITATIONS: 0
COUGH: 0
JOINT SWELLING: 0
SORE THROAT: 0
DIZZINESS: 0
HEARTBURN: 0
DYSURIA: 0
SHORTNESS OF BREATH: 0
HEADACHES: 0
FREQUENCY: 0
FEVER: 0
ABDOMINAL PAIN: 0
BREAST MASS: 0
ARTHRALGIAS: 0
DIARRHEA: 0
CHILLS: 0
NAUSEA: 0
MYALGIAS: 0

## 2024-01-16 ASSESSMENT — PAIN SCALES - GENERAL: PAINLEVEL: NO PAIN (0)

## 2024-01-16 NOTE — NURSING NOTE
Chief Complaint   Patient presents with    Physical     Annual well visit       Initial /78 (BP Location: Right arm, Patient Position: Sitting, Cuff Size: Adult Regular)   Pulse 77   Temp 98.6  F (37  C) (Temporal)   Resp 16   Ht 1.829 m (6')   Wt 79.4 kg (175 lb)   LMP 12/27/2023   SpO2 99%   Breastfeeding No   BMI 23.73 kg/m   Estimated body mass index is 23.73 kg/m  as calculated from the following:    Height as of this encounter: 1.829 m (6').    Weight as of this encounter: 79.4 kg (175 lb).    Medication Review: complete    The next two questions are to help us understand your food security.  If you are feeling you need any assistance in this area, we have resources available to support you today.          1/16/2024   SDOH- Food Insecurity   Within the past 12 months, did you worry that your food would run out before you got money to buy more? N   Within the past 12 months, did the food you bought just not last and you didn t have money to get more? N     Health Care Directive:  Patient does not have a Health Care Directive or Living Will: Discussed advance care planning with patient; however, patient declined at this time.    Shell Rosa LPN

## 2024-01-16 NOTE — PROGRESS NOTES
SUBJECTIVE:   Ann is a 43 year old, presenting for the following:  Annual well visit        Healthy Habits:     Getting at least 3 servings of Calcium per day:  Yes    Bi-annual eye exam:  Yes    Dental care twice a year:  Yes    Sleep apnea or symptoms of sleep apnea:  None    Diet:  Gluten-free/reduced    Frequency of exercise:  6-7 days/week    Duration of exercise:  45-60 minutes    Taking medications regularly:  Yes    Barriers to taking medications:  Not applicable    Medication side effects:  Not applicable    Additional concerns today:  No          PROBLEMS TO ADD ON...   Has been doing CGM - notices with eating that her BS goes up and then rapidly comes done; ranges 70-130s; fats seem to play the biggest roll in limiting downward spikes   Gallbladder - nuts/seeds/avocado will cause increased symptoms   Did a body dexa scan, was 1.4.    4.  Periods are regular, but just one day.      Have you ever done Advance Care Planning? (For example, a Health Directive, POLST, or a discussion with a medical provider or your loved ones about your wishes): No, advance care planning information given to patient to review.  Patient declined advance care planning discussion at this time.    Social History     Tobacco Use    Smoking status: Never    Smokeless tobacco: Never   Substance Use Topics    Alcohol use: Not Currently             1/16/2024     6:14 AM   Alcohol Use   Prescreen: >3 drinks/day or >7 drinks/week? No     Reviewed orders with patient.  Reviewed health maintenance and updated orders accordingly - Yes  No current outpatient medications on file.     No Known Allergies    Breast Cancer Screening:  Any new diagnosis of family breast, ovarian, or bowel cancer? No    FHS-7:        No data to display                Mammogram Screening - Offered annual screening and updated Health Maintenance for mutual plan based on risk factor consideration  Pertinent mammograms are reviewed under the imaging  tab.    History of abnormal Pap smear: NO - age 30-65 PAP every 5 years with negative HPV co-testing recommended      Latest Ref Rng & Units 2022    10:22 AM   PAP / HPV   PAP  Atypical squamous cells of undetermined significance (ASC-US)    HPV 16 DNA Negative Negative    HPV 18 DNA Negative Negative    Other HR HPV Negative Negative      Reviewed and updated as needed this visit by clinical staff   Tobacco  Allergies  Meds      Soc Hx        Reviewed and updated as needed this visit by Provider                 Past Medical History:   Diagnosis Date    Dysmenorrhea     2006    Labral tear of right hip joint     Other herpesviral infection     2004,genitial    Stress fracture     left hip    Vitamin D deficiency     10/5/2016      Past Surgical History:   Procedure Laterality Date    ARTHROSCOPY HIP Right 2018     SECTION N/A 2018    Procedure:  SECTION;;  Surgeon: Sona Newman MD;  Location:  OR     SECTION, TUBAL LIGATION, COMBINED Bilateral 2021    Procedure: repeat  section with bilateral salpingectomy;  Surgeon: Sona Newman MD;  Location:  OR    ENHANCE LASER REFRACTIVE NEW PT IN PARAMETERS           OB History    Para Term  AB Living   2 2 2 0 0 2   SAB IAB Ectopic Multiple Live Births   0 0 0 0 2      # Outcome Date GA Lbr Garcia/2nd Weight Sex Delivery Anes PTL Lv   2 Term 21 39w1d  3.385 kg (7 lb 7.4 oz) M CS-LTranv Spinal  ORION      Name: YONY ROSENTHAL-JANE      Apgar1: 8  Apgar5: 9   1 Term 18 40w3d  3.589 kg (7 lb 14.6 oz) M CS-LTranv EPI N ORION      Complications: Fetal Intolerance      Name: GALOBABY1 JANE      Apgar1: 9  Apgar5: 9       Review of Systems   Constitutional:  Negative for chills and fever.   HENT:  Negative for congestion, ear pain, hearing loss and sore throat.    Eyes:  Negative for pain and visual disturbance.   Respiratory:  Negative for cough and shortness of breath.     Cardiovascular:  Negative for chest pain, palpitations and peripheral edema.   Gastrointestinal:  Negative for abdominal pain, constipation, diarrhea, heartburn, hematochezia and nausea.   Breasts:  Negative for tenderness, breast mass and discharge.   Genitourinary:  Positive for genital sores. Negative for dysuria, frequency, hematuria, pelvic pain, urgency, vaginal bleeding and vaginal discharge.   Musculoskeletal:  Negative for arthralgias, joint swelling and myalgias.   Skin:  Negative for rash.   Neurological:  Negative for dizziness, weakness, headaches and paresthesias.   Psychiatric/Behavioral:  Negative for mood changes. The patient is not nervous/anxious.           OBJECTIVE:   /78 (BP Location: Right arm, Patient Position: Sitting, Cuff Size: Adult Regular)   Pulse 77   Temp 98.6  F (37  C) (Temporal)   Resp 16   Ht 1.829 m (6')   Wt 79.4 kg (175 lb)   LMP 12/27/2023   SpO2 99%   Breastfeeding No   BMI 23.73 kg/m    Physical Exam  GENERAL: healthy, alert and no distress  EYES: Eyes grossly normal to inspection, PERRL and conjunctivae and sclerae normal  HENT: ear canals and TM's normal, nose and mouth without ulcers or lesions  NECK: no adenopathy, no asymmetry, masses, or scars and thyroid normal to palpation  RESP: lungs clear to auscultation - no rales, rhonchi or wheezes  BREAST: normal without masses, tenderness or nipple discharge and no palpable axillary masses or adenopathy  CV: regular rate and rhythm, normal S1 S2, no S3 or S4, no murmur, click or rub, no peripheral edema and peripheral pulses strong  ABDOMEN: soft, nontender, no hepatosplenomegaly, no masses and bowel sounds normal  MS: no gross musculoskeletal defects noted, no edema  SKIN: no suspicious lesions or rashes  NEURO: Normal strength and tone, mentation intact and speech normal  PSYCH: mentation appears normal, affect normal/bright    Diagnostic Test Results:  Labs reviewed in Epic    ASSESSMENT/PLAN:        ICD-10-CM    1. Physical exam, annual  Z00.00       2. Vitamin D deficiency  E55.9 Vitamin D Total      3. Family history of thyroid disease  Z83.49 TSH     T4, Free         Labs today - vitamin D and thyroid testing  Discussed FH of kyphosis  Discussed her GB symptoms - she will continue to manage this dietarily   Immunizations declined           COUNSELING:  Reviewed preventive health counseling, as reflected in patient instructions        She reports that she has never smoked. She has never used smokeless tobacco.          ALLEN REYES MD  [facility]

## 2024-01-17 LAB — VIT D+METAB SERPL-MCNC: 44 NG/ML (ref 20–50)

## 2024-01-25 ENCOUNTER — MYC MEDICAL ADVICE (OUTPATIENT)
Dept: FAMILY MEDICINE | Facility: OTHER | Age: 44
End: 2024-01-25
Payer: COMMERCIAL

## 2024-01-25 DIAGNOSIS — M54.50 CHRONIC LOW BACK PAIN, UNSPECIFIED BACK PAIN LATERALITY, UNSPECIFIED WHETHER SCIATICA PRESENT: Primary | ICD-10-CM

## 2024-01-25 DIAGNOSIS — G89.29 CHRONIC LOW BACK PAIN, UNSPECIFIED BACK PAIN LATERALITY, UNSPECIFIED WHETHER SCIATICA PRESENT: Primary | ICD-10-CM

## 2024-02-07 ENCOUNTER — HOSPITAL ENCOUNTER (OUTPATIENT)
Dept: MRI IMAGING | Facility: OTHER | Age: 44
Discharge: HOME | End: 2024-02-07
Attending: FAMILY MEDICINE | Admitting: FAMILY MEDICINE
Payer: COMMERCIAL

## 2024-02-07 DIAGNOSIS — M54.50 CHRONIC LOW BACK PAIN, UNSPECIFIED BACK PAIN LATERALITY, UNSPECIFIED WHETHER SCIATICA PRESENT: ICD-10-CM

## 2024-02-07 DIAGNOSIS — G89.29 CHRONIC LOW BACK PAIN, UNSPECIFIED BACK PAIN LATERALITY, UNSPECIFIED WHETHER SCIATICA PRESENT: ICD-10-CM

## 2024-02-07 PROCEDURE — 72148 MRI LUMBAR SPINE W/O DYE: CPT

## 2024-09-18 NOTE — TELEPHONE ENCOUNTER
Patient Information     Patient Name MRN Ann De La Garza [id number] Female 1980      Telephone Encounter by Marilee Malcolm RN at 2017  1:48 PM     Author:  Marilee Malcolm RN Service:  (none) Author Type:  NURS- Registered Nurse     Filed:  2017  1:52 PM Encounter Date:  2017 Status:  Signed     :  Marilee Malcolm RN (NURS- Registered Nurse)            Patient states that she would like to do one round of IUI here locally - if this does not work then would like IVF referral. Patient would like trial of clomid + IUI. Patient will call this nurse with first day of menstrual cycle. Please advise to patient's specific fertility plan.  Marilee Malcolm RN............. 2017 1:52 PM         
Patient Information     Patient Name MRN Sex Ann Escobedo [id number] Female 1980      Telephone Encounter by Sona Newman MD at 2017  4:09 PM     Author:  Sona Newman MD Service:  (none) Author Type:  Physician     Filed:  2017  4:10 PM Encounter Date:  2017 Status:  Signed     :  Sona Newman MD (Physician)            Will plan for clomid 50mg on days 3-7 with US between day 10-12. Repeat US as indicated by that scan. Will plan for Ovidrel followed by IUI 24 hrs later once dominant follicle established.    Sona Newman MD  OB/GYN  2017 4:10 PM          
Cerebral Venogram/ Angiogram/ VSS

## 2024-12-17 ENCOUNTER — PATIENT OUTREACH (OUTPATIENT)
Dept: CARE COORDINATION | Facility: CLINIC | Age: 44
End: 2024-12-17
Payer: COMMERCIAL

## 2024-12-31 ENCOUNTER — PATIENT OUTREACH (OUTPATIENT)
Dept: CARE COORDINATION | Facility: CLINIC | Age: 44
End: 2024-12-31
Payer: COMMERCIAL

## 2025-03-14 NOTE — DISCHARGE SUMMARY
DELIVERY DISCHARGE SUMMARY    Admit date: 2018  Discharge date: 9/3/2018     Admit Dx:   - 38 year old y/o  at 39w0d  -  Advanced maternal age    Discharge Dx:  - Same as above, s/p Primary low transverse  section    Procedures:  - Primary low transverse  section with double layer closure via Pfannenstiel incision  - Epidural and Spinal analgesia    Admit HPI:  Ms. Ann Lopez is a 38 year old  at 39w0d who was admitted on 2018 for IOL for advanced maternal age.  Please see her admit H&P for full details of her PMH, PSH, Meds, Allergies and exam on admit.    Hospital course:  For her induction, she received 36 hours of cervical ripening followed by pitocin and AROM. During pitocin augmentation at 4-5 cm, she developed recurrent fetal decelerations that did not resolve with repositioning, fluids, and amnioinfusion. Given category II FHT remote from delivery, she was recommended to have a  section.    EBL from the delivery was 1000. Please see her  Section Operative Note for full details regarding her delivery.    Her postoperative course was uncomplicated. On POD#2, she was meeting all of her postpartum goals and deemed stable for discharge. She was voiding without difficulty, tolerating a regular diet without nausea and vomiting, her pain was well controlled on oral pain medicines and her lochia was appropriate. Her hemoglobin prior to delivery was 11.7 and after delivery was 9.3. Her Rh status was positive and Rhogam was not indicated.       Discharge Medications:  Current Discharge Medication List      START taking these medications    Details   oxyCODONE-acetaminophen (PERCOCET) 5-325 MG per tablet Take 1-2 tablets by mouth every 4 hours as needed for other (pain control or improvement in physical function. Hold dose for analgesic side effects.)  Qty: 10 tablet, Refills: 0    Associated Diagnoses: S/P  section         CONTINUE these  medications which have NOT CHANGED    Details   Prenatal Vit-Fe Fumarate-FA ([employer] PRENATAL) 28-0.8 MG TABS Take 1 tablet by mouth daily      acyclovir (ZOVIRAX) 400 MG tablet Take 1 tablet (400 mg) by mouth 2 times daily  Qty: 80 tablet, Refills: 0    Associated Diagnoses: History of herpes genitalis      Misc. Devices (BREAST PUMP) MISC Reason for need: breastfeeding. Length of need: 1 year  Qty: 1 each, Refills: 0    Associated Diagnoses: Supervision of high risk pregnancy in third trimester      Omega-3 Fatty Acids (FISH OIL PO) Take 1 capsule by mouth daily               Discharge/Disposition:  Ann Lopez was discharged to home in stable condition with the following instructions/medications:  1) Call for temperature > 100.4, bright red vaginal bleeding >1 pad an hour x 2 hours, foul smelling vaginal discharge, pain not controlled by usual oral pain meds, persistent nausea and vomiting not controlled on medications, drainage or redness from incision site  2) She desired abstinence for contraception, has plans for hip surgery postpartum.  3) For feeding she decided to breastfeed.  4) She was instructed to follow-up with Dr INA Newman in 2 weeks for an incision check adn in 6 weeks for a routine postpartum visit.  5) Discharge activity:  No heavy lifting >15 lbs or strenuous activity for 6 weeks, pelvic rest for 6 weeks, no driving or operating machinery while on narcotics.      Sona Newman MD  OBGYN   9/3/2018 9:43 AM                   Statement Selected

## 2025-04-02 ENCOUNTER — E-VISIT (OUTPATIENT)
Dept: FAMILY MEDICINE | Facility: OTHER | Age: 45
End: 2025-04-02
Payer: COMMERCIAL

## 2025-04-02 DIAGNOSIS — Z01.89 ENCOUNTER FOR LABORATORY TEST: Primary | ICD-10-CM

## 2025-04-03 NOTE — PATIENT INSTRUCTIONS
Thank you for choosing us for your care. I have placed the below lab(s) for you:  No orders of the defined types were placed in this encounter.       To schedule your lab appointment, please click the Schedule button in your [facility] Home Page.

## 2025-04-09 ENCOUNTER — LAB (OUTPATIENT)
Dept: LAB | Facility: OTHER | Age: 45
End: 2025-04-09
Attending: FAMILY MEDICINE
Payer: COMMERCIAL

## 2025-04-09 DIAGNOSIS — Z01.89 ENCOUNTER FOR LABORATORY TEST: ICD-10-CM

## 2025-04-09 LAB
ALBUMIN SERPL BCG-MCNC: 4.4 G/DL (ref 3.5–5.2)
ALP SERPL-CCNC: 54 U/L (ref 40–150)
ALT SERPL W P-5'-P-CCNC: 23 U/L (ref 0–50)
ANION GAP SERPL CALCULATED.3IONS-SCNC: 9 MMOL/L (ref 7–15)
AST SERPL W P-5'-P-CCNC: 20 U/L (ref 0–45)
BILIRUB SERPL-MCNC: 0.6 MG/DL
BUN SERPL-MCNC: 23.3 MG/DL (ref 6–20)
CALCIUM SERPL-MCNC: 9.1 MG/DL (ref 8.8–10.4)
CHLORIDE SERPL-SCNC: 107 MMOL/L (ref 98–107)
CHOLEST SERPL-MCNC: 169 MG/DL
CREAT SERPL-MCNC: 1.14 MG/DL (ref 0.51–0.95)
CREAT UR-MCNC: 123.7 MG/DL
CRP SERPL-MCNC: <3 MG/L
EGFRCR SERPLBLD CKD-EPI 2021: 61 ML/MIN/1.73M2
EST. AVERAGE GLUCOSE BLD GHB EST-MCNC: 97 MG/DL
ESTRADIOL SERPL-MCNC: 75 PG/ML
FASTING STATUS PATIENT QL REPORTED: ABNORMAL
FASTING STATUS PATIENT QL REPORTED: NORMAL
FERRITIN SERPL-MCNC: 94 NG/ML (ref 6–175)
GLUCOSE SERPL-MCNC: 90 MG/DL (ref 70–99)
HBA1C MFR BLD: 5 %
HCO3 SERPL-SCNC: 22 MMOL/L (ref 22–29)
HDLC SERPL-MCNC: 74 MG/DL
IRON BINDING CAPACITY (ROCHE): 258 UG/DL (ref 240–430)
IRON SATN MFR SERPL: 51 % (ref 15–46)
IRON SERPL-MCNC: 132 UG/DL (ref 37–145)
LDLC SERPL CALC-MCNC: 83 MG/DL
MICROALBUMIN UR-MCNC: <12 MG/L
MICROALBUMIN/CREAT UR: NORMAL MG/G{CREAT}
NONHDLC SERPL-MCNC: 95 MG/DL
POTASSIUM SERPL-SCNC: 4.4 MMOL/L (ref 3.4–5.3)
PROGEST SERPL-MCNC: 17.2 NG/ML
PROT SERPL-MCNC: 7.1 G/DL (ref 6.4–8.3)
SODIUM SERPL-SCNC: 138 MMOL/L (ref 135–145)
TRIGL SERPL-MCNC: 60 MG/DL
TSH SERPL DL<=0.005 MIU/L-ACNC: 2.58 UIU/ML (ref 0.3–4.2)
VIT B12 SERPL-MCNC: 457 PG/ML (ref 232–1245)

## 2025-04-09 PROCEDURE — 83036 HEMOGLOBIN GLYCOSYLATED A1C: CPT | Mod: ZL

## 2025-04-09 PROCEDURE — 84270 ASSAY OF SEX HORMONE GLOBUL: CPT | Mod: ZL

## 2025-04-09 PROCEDURE — 84443 ASSAY THYROID STIM HORMONE: CPT | Mod: ZL

## 2025-04-09 PROCEDURE — 82306 VITAMIN D 25 HYDROXY: CPT | Mod: ZL

## 2025-04-09 PROCEDURE — 82670 ASSAY OF TOTAL ESTRADIOL: CPT | Mod: ZL

## 2025-04-09 PROCEDURE — 82728 ASSAY OF FERRITIN: CPT | Mod: ZL

## 2025-04-09 PROCEDURE — 82627 DEHYDROEPIANDROSTERONE: CPT | Mod: ZL

## 2025-04-09 PROCEDURE — 83550 IRON BINDING TEST: CPT | Mod: ZL

## 2025-04-09 PROCEDURE — 36415 COLL VENOUS BLD VENIPUNCTURE: CPT | Mod: ZL

## 2025-04-09 PROCEDURE — 83525 ASSAY OF INSULIN: CPT | Mod: ZL

## 2025-04-09 PROCEDURE — 84478 ASSAY OF TRIGLYCERIDES: CPT | Mod: ZL

## 2025-04-09 PROCEDURE — 86140 C-REACTIVE PROTEIN: CPT | Mod: ZL

## 2025-04-09 PROCEDURE — 84144 ASSAY OF PROGESTERONE: CPT | Mod: ZL

## 2025-04-09 PROCEDURE — 82533 TOTAL CORTISOL: CPT | Mod: ZL

## 2025-04-09 PROCEDURE — 84403 ASSAY OF TOTAL TESTOSTERONE: CPT | Mod: ZL

## 2025-04-09 PROCEDURE — 82435 ASSAY OF BLOOD CHLORIDE: CPT | Mod: ZL

## 2025-04-09 PROCEDURE — 82947 ASSAY GLUCOSE BLOOD QUANT: CPT | Mod: ZL

## 2025-04-09 PROCEDURE — 82043 UR ALBUMIN QUANTITATIVE: CPT | Mod: ZL

## 2025-04-09 PROCEDURE — 82570 ASSAY OF URINE CREATININE: CPT | Mod: ZL

## 2025-04-09 PROCEDURE — 82607 VITAMIN B-12: CPT | Mod: ZL

## 2025-04-10 LAB
CORTIS SERPL-MCNC: 12.9 UG/DL
DHEA-S SERPL-MCNC: 91 UG/DL (ref 35–430)
INSULIN SERPL-ACNC: 2.4 UU/ML (ref 2.6–24.9)
SHBG SERPL-SCNC: 87 NMOL/L (ref 30–135)
VIT D+METAB SERPL-MCNC: 50 NG/ML (ref 20–50)

## 2025-04-11 LAB
TESTOST FREE SERPL-MCNC: 0.12 NG/DL
TESTOST SERPL-MCNC: 13 NG/DL (ref 8–60)

## 2025-04-14 ENCOUNTER — MYC MEDICAL ADVICE (OUTPATIENT)
Dept: FAMILY MEDICINE | Facility: OTHER | Age: 45
End: 2025-04-14
Payer: COMMERCIAL

## 2025-04-14 NOTE — TELEPHONE ENCOUNTER
When do you want follow-up appointment (noted below)?  Asap?        Dear Ann,     Here are your recent results. With your lab results, I have some concerns about your creatinine/kidney function.  Please make a follow up appointment so we can recheck this and other related labs.       Please let us know if you have any questions or concerns.  Sincerely,  TANESHA REYES MD   Written by Tanesha Reyes MD on 4/14/2025  8:45 AM CDT  Seen by patient Ann OLIVARES Jessica on 4/14/2025  8:55 AM    Karina Mcbride RN on 4/14/2025 at 9:14 AM

## 2025-05-16 NOTE — PROGRESS NOTES
Assessment & Plan       ICD-10-CM    1. CKD (chronic kidney disease) stage 2, GFR 60-89 ml/min  N18.2 Basic Metabolic Panel     UA Macroscopic with reflex to Microscopic and Culture     US Renal Complete Non-Vascular     CBC W PLT No Diff     Anti Nuclear Yu IgG by IFA with Reflex     Uric acid     Phosphorus     UA Macroscopic with reflex to Microscopic and Culture     Basic Metabolic Panel     CBC W PLT No Diff     Anti Nuclear Yu IgG by IFA with Reflex     Uric acid     Phosphorus      2. Macrocytosis without anemia  D75.89 Folic Acid          Potential causes of chronic kidney disease were discussed with patient.  This could be structural, toxic, dehydration, past acute kidney injury, autoimmune, genetic.  We reviewed current supplements and possible effects of these.  Reviewed past metabolic panel results.  Will recheck metabolic panel today along with UA, CBC, uric acid, phosphorus, ALEXANDRA and would recommend proceeding with renal ultrasound.  Discussed renal precautions   - especially with endurance sport activity  Macrocytosis - old  finding, past vitamin B12 normal and LFTS/thyroid normal.  Discussed FH of a fib and risk factors (HT) she has vs risk factors she doesn't have (alcohol use disorder)    PDMP Review       None                       The longitudinal plan of care was addressed during this visit. Due to the added complexity in care, I will continue to support Ann OLIVARES Jessica in the subsequent management of this condition(s) and with the ongoing continuity of care of this condition(s).     ALLEN REYES MD  [facility]    Subjective   Ann Lopez is a 44 year old female  presenting for the following health issues: Nursing Notes:   Shell Rosa LPN  5/20/2025  3:38 PM  Signed  Chief Complaint   Patient presents with    RECHECK     F/U - labs - creatinine, kidney function       Initial /72 (BP Location: Right arm, Patient Position: Sitting, Cuff  Size: Adult Regular)   Pulse 73   Temp 97.9  F (36.6  C) (Temporal)   Resp 16   Ht 1.829 m (6')   Wt 78.5 kg (173 lb)   LMP 05/10/2025 (Exact Date)   SpO2 98%   Breastfeeding No   BMI 23.46 kg/m   Estimated body mass index is 23.46 kg/m  as calculated from the following:    Height as of this encounter: 1.829 m (6').    Weight as of this encounter: 78.5 kg (173 lb).    Medication Review: complete    The next two questions are to help us understand your food security.  If you are feeling you need any assistance in this area, we have resources available to support you today.          1/16/2024   SDOH- Food Insecurity   Within the past 12 months, did you worry that your food would run out before you got money to buy more? N   Within the past 12 months, did the food you bought just not last and you didn t have money to get more? N        Data saved with a previous flowsheet row definition       Health Care Directive:  Patient does not have a Health Care Directive: Discussed advance care planning with patient; however, patient declined at this time.    Shell Rosa, FRANCISCO                                 HPI Annnova Lopez is a 44 year old female presents for follow up of elevated creatinine level.    Was taking creatine - stopped this for 2 weeks and now a few times a week   No history of hypertension      No taking NSAID.  No known history of structural renal disease, stones, CAMILLE, rhabdo     No known FH of CKD, kidney stones   Dad recently diagnosed with a fib    Answers submitted by the patient for this visit:  General Questionnaire (Submitted on 5/17/2025)  Chief Complaint: Chronic problems general questions HPI Form  How many days per week do you miss taking your medication?: 0  General Concern (Submitted on 5/17/2025)  Chief Complaint: Chronic problems general questions HPI Form  What is the reason for your visit today?: F/u for labs that show kidney issues  When did your symptoms begin?: More than a  month  What are your symptoms?: Not sure i truly have any, labs are showing it  How would you describe these symptoms?: Mild  Are your symptoms:: Staying the same  Have you had these symptoms before?: No  Is there anything that makes you feel worse?: No  Is there anything that makes you feel better?: No  Questionnaire about: Chronic problems general questions HPI Form (Submitted on 5/17/2025)  Chief Complaint: Chronic problems general questions HPI Form      No current outpatient medications on file.     No current facility-administered medications for this visit.     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,genitial    Stress fracture 2000    left hip    Vitamin D deficiency     10/5/2016               Review of Systems           6/2/2020     9:12 AM   PHQ   PHQ-9 Total Score 0   Q9: Thoughts of better off dead/self-harm past 2 weeks Not at all          No data to display                      Objective  /72 (BP Location: Right arm, Patient Position: Sitting, Cuff Size: Adult Regular)   Pulse 73   Temp 97.9  F (36.6  C) (Temporal)   Resp 16   Ht 1.829 m (6')   Wt 78.5 kg (173 lb)   LMP 05/10/2025 (Exact Date)   SpO2 98%   Breastfeeding No   BMI 23.46 kg/m     Physical Exam   GENERAL: alert and no distress    Lab on 04/09/2025   Component Date Value Ref Range Status    Iron 04/09/2025 132  37 - 145 ug/dL Final    Iron Binding Capacity 04/09/2025 258  240 - 430 ug/dL Final    Iron Sat Index 04/09/2025 51 (H)  15 - 46 % Final    Estimated Average Glucose 04/09/2025 97  <117 mg/dL Final    Hemoglobin A1C 04/09/2025 5.0  <5.7 % Final    Normal <5.7%   Prediabetes 5.7-6.4%    Diabetes 6.5% or higher     Note: Adopted from ADA consensus guidelines.    Cholesterol 04/09/2025 169  <200 mg/dL Final    Triglycerides 04/09/2025 60  <150 mg/dL Final    Direct Measure HDL 04/09/2025 74  >=50 mg/dL Final    LDL  Cholesterol Calculated 04/09/2025 83  <100 mg/dL Final    Non HDL Cholesterol 04/09/2025 95  <130 mg/dL Final    Patient Fasting > 8hrs? 04/09/2025 Unknown   Final    Vitamin D, Total (25-Hydroxy) 04/09/2025 50  20 - 50 ng/mL Final    optimum levels    Vitamin B12 04/09/2025 457  232 - 1,245 pg/mL Final    TSH 04/09/2025 2.58  0.30 - 4.20 uIU/mL Final    Sodium 04/09/2025 138  135 - 145 mmol/L Final    Potassium 04/09/2025 4.4  3.4 - 5.3 mmol/L Final    Carbon Dioxide (CO2) 04/09/2025 22  22 - 29 mmol/L Final    Anion Gap 04/09/2025 9  7 - 15 mmol/L Final    Urea Nitrogen 04/09/2025 23.3 (H)  6.0 - 20.0 mg/dL Final    Creatinine 04/09/2025 1.14 (H)  0.51 - 0.95 mg/dL Final    GFR Estimate 04/09/2025 61  >60 mL/min/1.73m2 Final    eGFR calculated using 2021 CKD-EPI equation.    Calcium 04/09/2025 9.1  8.8 - 10.4 mg/dL Final    Chloride 04/09/2025 107  98 - 107 mmol/L Final    Glucose 04/09/2025 90  70 - 99 mg/dL Final    Alkaline Phosphatase 04/09/2025 54  40 - 150 U/L Final    AST 04/09/2025 20  0 - 45 U/L Final    ALT 04/09/2025 23  0 - 50 U/L Final    Protein Total 04/09/2025 7.1  6.4 - 8.3 g/dL Final    Albumin 04/09/2025 4.4  3.5 - 5.2 g/dL Final    Bilirubin Total 04/09/2025 0.6  <=1.2 mg/dL Final    Patient Fasting > 8hrs? 04/09/2025 Unknown   Final    Insulin 04/09/2025 2.4 (L)  2.6 - 24.9 uU/mL Final    Cortisol 04/09/2025 12.9    ug/dL Final    6 to 10 AM Cortisol Reference Range:  4-22 ug/dL   4 to 8 PM Cortisol Reference Range:  3-17 ug/dL    CRP Inflammation 04/09/2025 <3.00  <5.00 mg/L Final    Creatinine Urine mg/dL 04/09/2025 123.7  mg/dL Final    The reference ranges have not been established in urine creatinine. The results should be integrated into the clinical context for interpretation.    Albumin Urine mg/L 04/09/2025 <12.0  mg/L Final    The reference ranges have not been established in urine albumin. The results should be integrated into the clinical context for interpretation.    Albumin  Urine mg/g Cr 04/09/2025    Final    Unable to calculate, urine albumin and/or urine creatinine is outside detectable limits.  Microalbuminuria is defined as an albumin:creatinine ratio of 17 to 299 for males and 25 to 299 for females. A ratio of albumin:creatinine of 300 or higher is indicative of overt proteinuria.  Due to biologic variability, positive results should be confirmed by a second, first-morning random or 24-hour timed urine specimen. If there is discrepancy, a third specimen is recommended. When 2 out of 3 results are in the microalbuminuria range, this is evidence for incipient nephropathy and warrants increased efforts at glucose control, blood pressure control, and institution of therapy with an angiotensin-converting-enzyme (ACE) inhibitor (if the patient can tolerate it).      Ferritin 04/09/2025 94  6 - 175 ng/mL Final    Estradiol 04/09/2025 75  pg/mL Final    Healthy Men:   11.3-43.2 pg/mL    Healthy Postmenopausal Women:  Postmenopause: <5-138 pg/mL    Healthy Pregnant Women:  1st trimester: 154-3243 pg/mL  2nd trimester: 1561-91630 pg/mL  3rd trimester: 8525->69912 pg/mL    Healthy Women Cycle Phase:  Follicular: 30.9-90.4 pg/mL  Ovulation: 60.4-533 pg/mL  Luteal: 60.4-232 pg/mL    Healthy Women Cycle Sub-Phase:  Early Follicular: 20.5-62.8 pg/mL  Intermediate Follicular: 26-79.8 pg/mL  Late Follicular: 49.5-233 pg/mL  Ovulation: 60.4-602 pg/mL  Early Luteal: 51.1-179 pg/mL  Intermediate Luteal: 66.5-305 pg/mL  Late Luteal: 30.2-222 pg/mL    Progesterone 04/09/2025 17.2  ng/mL Final      Healthy Postmenopausal Women:        Postmenopause: <=0.1 ng/mL     Healthy Pregnant Women:        1st Trimester: 11.0-44.3 ng/mL        2nd Trimester: 25.4-83.4 ng/mL        3rd Trimester: 58.7-214.0 ng/mL     Healthy Women Cycle Phase:        Follicular: <0.1-0.2 ng/mL        Ovulation: 0.1-4.1 ng/mL        Luteal: 4.1-14.5 ng/mL    Healthy Women Cycle Sub Phase:       Early Follicular: <0.1-0.3 ng/mL        Intermediate Follicular: <0.1-0.2 ng/mL       Late Follicular: <0.1-0.2 ng/mL       Ovulation: <0.1-2.4 ng/mL       Early Luteal: 2.4-15.1 ng/mL       Intermediate Luteal: 4.8-20.9 ng/mL       Late Luteal: 0.5-13.5 ng/mL      DHEA Sulfate 04/09/2025 91  35 - 430 ug/dL Final    Sex Hormone Binding Globulin 04/09/2025 87  30 - 135 nmol/L Final    Free Testosterone Calculated 04/09/2025 0.12  ng/dL Final    Adult Female Reference Range:  18-30 Years: 0.08-0.74 ng/dL  31-40 Years: 0.13-0.92 ng/dL  41-51 Years: 0.11-0.58 ng/dL  Postmenopausal: 0.06-0.38 ng/dL    Testosterone Total 04/09/2025 13  8 - 60 ng/dL Final

## 2025-05-20 ENCOUNTER — RESULTS FOLLOW-UP (OUTPATIENT)
Dept: FAMILY MEDICINE | Facility: OTHER | Age: 45
End: 2025-05-20

## 2025-05-20 ENCOUNTER — OFFICE VISIT (OUTPATIENT)
Dept: FAMILY MEDICINE | Facility: OTHER | Age: 45
End: 2025-05-20
Attending: FAMILY MEDICINE
Payer: COMMERCIAL

## 2025-05-20 VITALS
WEIGHT: 173 LBS | OXYGEN SATURATION: 98 % | BODY MASS INDEX: 23.43 KG/M2 | DIASTOLIC BLOOD PRESSURE: 72 MMHG | RESPIRATION RATE: 16 BRPM | TEMPERATURE: 97.9 F | HEART RATE: 73 BPM | HEIGHT: 72 IN | SYSTOLIC BLOOD PRESSURE: 124 MMHG

## 2025-05-20 DIAGNOSIS — N18.2 CKD (CHRONIC KIDNEY DISEASE) STAGE 2, GFR 60-89 ML/MIN: Primary | ICD-10-CM

## 2025-05-20 DIAGNOSIS — Z83.49 FAMILY HISTORY OF THYROID DISEASE: ICD-10-CM

## 2025-05-20 DIAGNOSIS — D75.89 MACROCYTOSIS WITHOUT ANEMIA: ICD-10-CM

## 2025-05-20 DIAGNOSIS — Z82.49 FAMILY HISTORY OF ATRIAL FIBRILLATION: ICD-10-CM

## 2025-05-20 LAB
ALBUMIN UR-MCNC: NEGATIVE MG/DL
ANION GAP SERPL CALCULATED.3IONS-SCNC: 13 MMOL/L (ref 7–15)
APPEARANCE UR: CLEAR
BILIRUB UR QL STRIP: NEGATIVE
BUN SERPL-MCNC: 22.7 MG/DL (ref 6–20)
CALCIUM SERPL-MCNC: 9.5 MG/DL (ref 8.8–10.4)
CHLORIDE SERPL-SCNC: 106 MMOL/L (ref 98–107)
COLOR UR AUTO: YELLOW
CREAT SERPL-MCNC: 0.99 MG/DL (ref 0.51–0.95)
EGFRCR SERPLBLD CKD-EPI 2021: 72 ML/MIN/1.73M2
ERYTHROCYTE [DISTWIDTH] IN BLOOD BY AUTOMATED COUNT: 12 % (ref 10–15)
GLUCOSE SERPL-MCNC: 96 MG/DL (ref 70–99)
GLUCOSE UR STRIP-MCNC: NEGATIVE MG/DL
HCO3 SERPL-SCNC: 22 MMOL/L (ref 22–29)
HCT VFR BLD AUTO: 40.1 % (ref 35–47)
HGB BLD-MCNC: 13.3 G/DL (ref 11.7–15.7)
HGB UR QL STRIP: NEGATIVE
KETONES UR STRIP-MCNC: NEGATIVE MG/DL
LEUKOCYTE ESTERASE UR QL STRIP: NEGATIVE
MCH RBC QN AUTO: 33.4 PG (ref 26.5–33)
MCHC RBC AUTO-ENTMCNC: 33.2 G/DL (ref 31.5–36.5)
MCV RBC AUTO: 101 FL (ref 78–100)
NITRATE UR QL: NEGATIVE
PH UR STRIP: 6 [PH] (ref 5–9)
PHOSPHATE SERPL-MCNC: 2.7 MG/DL (ref 2.5–4.5)
PLATELET # BLD AUTO: 185 10E3/UL (ref 150–450)
POTASSIUM SERPL-SCNC: 3.9 MMOL/L (ref 3.4–5.3)
RBC # BLD AUTO: 3.98 10E6/UL (ref 3.8–5.2)
SODIUM SERPL-SCNC: 141 MMOL/L (ref 135–145)
SP GR UR STRIP: 1 (ref 1–1.03)
URATE SERPL-MCNC: 3.2 MG/DL (ref 2.4–5.7)
UROBILINOGEN UR STRIP-MCNC: NORMAL MG/DL
WBC # BLD AUTO: 6 10E3/UL (ref 4–11)

## 2025-05-20 PROCEDURE — 84100 ASSAY OF PHOSPHORUS: CPT | Mod: ZL | Performed by: FAMILY MEDICINE

## 2025-05-20 PROCEDURE — 86038 ANTINUCLEAR ANTIBODIES: CPT | Mod: ZL | Performed by: FAMILY MEDICINE

## 2025-05-20 PROCEDURE — 36415 COLL VENOUS BLD VENIPUNCTURE: CPT | Mod: ZL | Performed by: FAMILY MEDICINE

## 2025-05-20 PROCEDURE — 80048 BASIC METABOLIC PNL TOTAL CA: CPT | Mod: ZL | Performed by: FAMILY MEDICINE

## 2025-05-20 PROCEDURE — 84550 ASSAY OF BLOOD/URIC ACID: CPT | Mod: ZL | Performed by: FAMILY MEDICINE

## 2025-05-20 PROCEDURE — 81003 URINALYSIS AUTO W/O SCOPE: CPT | Mod: ZL | Performed by: FAMILY MEDICINE

## 2025-05-20 PROCEDURE — 85027 COMPLETE CBC AUTOMATED: CPT | Mod: ZL | Performed by: FAMILY MEDICINE

## 2025-05-20 ASSESSMENT — PAIN SCALES - GENERAL: PAINLEVEL_OUTOF10: NO PAIN (0)

## 2025-05-20 NOTE — NURSING NOTE
Chief Complaint   Patient presents with    RECHECK     F/U - labs - creatinine, kidney function       Initial /72 (BP Location: Right arm, Patient Position: Sitting, Cuff Size: Adult Regular)   Pulse 73   Temp 97.9  F (36.6  C) (Temporal)   Resp 16   Ht 1.829 m (6')   Wt 78.5 kg (173 lb)   LMP 05/10/2025 (Exact Date)   SpO2 98%   Breastfeeding No   BMI 23.46 kg/m   Estimated body mass index is 23.46 kg/m  as calculated from the following:    Height as of this encounter: 1.829 m (6').    Weight as of this encounter: 78.5 kg (173 lb).    Medication Review: complete    The next two questions are to help us understand your food security.  If you are feeling you need any assistance in this area, we have resources available to support you today.          1/16/2024   SDOH- Food Insecurity   Within the past 12 months, did you worry that your food would run out before you got money to buy more? N   Within the past 12 months, did the food you bought just not last and you didn t have money to get more? N        Data saved with a previous flowsheet row definition       Health Care Directive:  Patient does not have a Health Care Directive: Discussed advance care planning with patient; however, patient declined at this time.    Shell Rosa LPN

## 2025-05-22 LAB — ANA SER QL IF: NEGATIVE

## 2025-06-12 ENCOUNTER — LAB (OUTPATIENT)
Dept: LAB | Facility: OTHER | Age: 45
End: 2025-06-12
Attending: FAMILY MEDICINE
Payer: COMMERCIAL

## 2025-06-12 DIAGNOSIS — N18.2 CKD (CHRONIC KIDNEY DISEASE) STAGE 2, GFR 60-89 ML/MIN: ICD-10-CM

## 2025-06-12 DIAGNOSIS — D75.89 MACROCYTOSIS WITHOUT ANEMIA: ICD-10-CM

## 2025-06-12 LAB — FOLATE SERPL-MCNC: 7.2 NG/ML (ref 4.6–34.8)

## 2025-06-12 PROCEDURE — 80048 BASIC METABOLIC PNL TOTAL CA: CPT | Mod: ZL

## 2025-06-12 PROCEDURE — 82746 ASSAY OF FOLIC ACID SERUM: CPT | Mod: ZL

## 2025-06-12 PROCEDURE — 36415 COLL VENOUS BLD VENIPUNCTURE: CPT | Mod: ZL

## 2025-06-13 LAB
ANION GAP SERPL CALCULATED.3IONS-SCNC: 13 MMOL/L (ref 7–15)
BUN SERPL-MCNC: 19.4 MG/DL (ref 6–20)
CALCIUM SERPL-MCNC: 8.9 MG/DL (ref 8.8–10.4)
CHLORIDE SERPL-SCNC: 108 MMOL/L (ref 98–107)
CREAT SERPL-MCNC: 1.12 MG/DL (ref 0.51–0.95)
EGFRCR SERPLBLD CKD-EPI 2021: 61 ML/MIN/1.73M2
GLUCOSE SERPL-MCNC: 93 MG/DL (ref 70–99)
HCO3 SERPL-SCNC: 20 MMOL/L (ref 22–29)
POTASSIUM SERPL-SCNC: 4.6 MMOL/L (ref 3.4–5.3)
SODIUM SERPL-SCNC: 141 MMOL/L (ref 135–145)

## 2025-07-07 ENCOUNTER — HOSPITAL ENCOUNTER (OUTPATIENT)
Dept: ULTRASOUND IMAGING | Facility: OTHER | Age: 45
Discharge: HOME | End: 2025-07-07
Attending: FAMILY MEDICINE | Admitting: RADIOLOGY
Payer: COMMERCIAL

## 2025-07-07 DIAGNOSIS — N18.2 CKD (CHRONIC KIDNEY DISEASE) STAGE 2, GFR 60-89 ML/MIN: ICD-10-CM

## 2025-07-07 PROCEDURE — 76770 US EXAM ABDO BACK WALL COMP: CPT

## 2025-07-07 PROCEDURE — 76770 US EXAM ABDO BACK WALL COMP: CPT | Mod: 26 | Performed by: RADIOLOGY

## 2025-07-27 ENCOUNTER — MYC MEDICAL ADVICE (OUTPATIENT)
Dept: FAMILY MEDICINE | Facility: OTHER | Age: 45
End: 2025-07-27
Payer: COMMERCIAL

## 2025-07-27 NOTE — TELEPHONE ENCOUNTER
Recommend evisit first to get additional history and probably order ECG and zio patch prior to seeing cardiology.  Tanesha Johnson MD

## 2025-07-28 ENCOUNTER — E-VISIT (OUTPATIENT)
Dept: FAMILY MEDICINE | Facility: OTHER | Age: 45
End: 2025-07-28
Payer: COMMERCIAL

## 2025-07-28 DIAGNOSIS — R69 DIAGNOSIS UNKNOWN: Primary | ICD-10-CM

## 2025-07-28 NOTE — PATIENT INSTRUCTIONS
Dear Ann,    We are sorry you are not feeling well. Based on the responses you provided, it is recommended that you be seen in-person in clinic so we can better evaluate your symptoms. Please schedule this visit in [facility]. You will have a Schedule Now button in [facility] to help with scheduling this appointment. Otherwise, you can call [phone] to schedule an appointment.     IF you are experiencing a fast heart rate, chest pain, or any symptoms that are causing you concern I would encourage you to be checked out TODAY by going to the rapid clinic or the emergency room (especially go to the ER if you have chest pain).     You should be worked up with an EKG and some blood work at minimum.       You will not be charged for this eVisit. Thank you for trusting us with your care.     Shila Ty NP

## 2025-07-31 ENCOUNTER — PATIENT OUTREACH (OUTPATIENT)
Dept: CARE COORDINATION | Facility: CLINIC | Age: 45
End: 2025-07-31
Payer: COMMERCIAL

## 2025-08-12 ENCOUNTER — OFFICE VISIT (OUTPATIENT)
Dept: FAMILY MEDICINE | Facility: OTHER | Age: 45
End: 2025-08-12
Attending: FAMILY MEDICINE
Payer: COMMERCIAL

## 2025-08-12 VITALS
WEIGHT: 176.2 LBS | BODY MASS INDEX: 23.9 KG/M2 | SYSTOLIC BLOOD PRESSURE: 112 MMHG | DIASTOLIC BLOOD PRESSURE: 78 MMHG | TEMPERATURE: 97.3 F | RESPIRATION RATE: 16 BRPM | HEART RATE: 60 BPM | OXYGEN SATURATION: 100 %

## 2025-08-12 DIAGNOSIS — R00.0 RACING HEART BEAT: ICD-10-CM

## 2025-08-12 DIAGNOSIS — R00.2 HEART PALPITATIONS: Primary | ICD-10-CM

## 2025-08-12 LAB
ALBUMIN SERPL BCG-MCNC: 4.1 G/DL (ref 3.5–5.2)
ALP SERPL-CCNC: 64 U/L (ref 40–150)
ALT SERPL W P-5'-P-CCNC: 22 U/L (ref 0–50)
ANION GAP SERPL CALCULATED.3IONS-SCNC: 11 MMOL/L (ref 7–15)
AST SERPL W P-5'-P-CCNC: 20 U/L (ref 0–45)
BILIRUB SERPL-MCNC: 0.5 MG/DL
BUN SERPL-MCNC: 20.6 MG/DL (ref 6–20)
CALCIUM SERPL-MCNC: 9.4 MG/DL (ref 8.8–10.4)
CHLORIDE SERPL-SCNC: 106 MMOL/L (ref 98–107)
CREAT SERPL-MCNC: 1.02 MG/DL (ref 0.51–0.95)
EGFRCR SERPLBLD CKD-EPI 2021: 69 ML/MIN/1.73M2
ERYTHROCYTE [DISTWIDTH] IN BLOOD BY AUTOMATED COUNT: 11.9 % (ref 10–15)
GLUCOSE SERPL-MCNC: 90 MG/DL (ref 70–99)
HCO3 SERPL-SCNC: 23 MMOL/L (ref 22–29)
HCT VFR BLD AUTO: 40.2 % (ref 35–47)
HGB BLD-MCNC: 13.2 G/DL (ref 11.7–15.7)
MAGNESIUM SERPL-MCNC: 1.8 MG/DL (ref 1.7–2.3)
MCH RBC QN AUTO: 33.2 PG (ref 26.5–33)
MCHC RBC AUTO-ENTMCNC: 32.8 G/DL (ref 31.5–36.5)
MCV RBC AUTO: 101.3 FL (ref 78–100)
PLATELET # BLD AUTO: 147 10E3/UL (ref 150–450)
POTASSIUM SERPL-SCNC: 3.7 MMOL/L (ref 3.4–5.3)
PROT SERPL-MCNC: 6.9 G/DL (ref 6.4–8.3)
RBC # BLD AUTO: 3.97 10E6/UL (ref 3.8–5.2)
SODIUM SERPL-SCNC: 140 MMOL/L (ref 135–145)
TSH SERPL DL<=0.005 MIU/L-ACNC: 1.28 UIU/ML (ref 0.3–4.2)
WBC # BLD AUTO: 4.89 10E3/UL (ref 4–11)

## 2025-08-12 PROCEDURE — 84443 ASSAY THYROID STIM HORMONE: CPT | Mod: ZL | Performed by: FAMILY MEDICINE

## 2025-08-12 PROCEDURE — 83735 ASSAY OF MAGNESIUM: CPT | Mod: ZL | Performed by: FAMILY MEDICINE

## 2025-08-12 PROCEDURE — 84520 ASSAY OF UREA NITROGEN: CPT | Mod: ZL | Performed by: FAMILY MEDICINE

## 2025-08-12 PROCEDURE — 36415 COLL VENOUS BLD VENIPUNCTURE: CPT | Mod: ZL | Performed by: FAMILY MEDICINE

## 2025-08-12 PROCEDURE — 93246 EXT ECG>7D<15D RECORDING: CPT | Performed by: FAMILY MEDICINE

## 2025-08-12 PROCEDURE — 86618 LYME DISEASE ANTIBODY: CPT | Mod: ZL | Performed by: FAMILY MEDICINE

## 2025-08-12 PROCEDURE — 85048 AUTOMATED LEUKOCYTE COUNT: CPT | Mod: ZL | Performed by: FAMILY MEDICINE

## 2025-08-12 ASSESSMENT — PAIN SCALES - GENERAL: PAINLEVEL_OUTOF10: NO PAIN (0)

## 2025-08-13 LAB — B BURGDOR IGG+IGM SER QL: 0.17

## 2025-08-14 LAB
ATRIAL RATE - MUSE: 56 BPM
DIASTOLIC BLOOD PRESSURE - MUSE: NORMAL MMHG
INTERPRETATION ECG - MUSE: NORMAL
P AXIS - MUSE: 40 DEGREES
PR INTERVAL - MUSE: 182 MS
QRS DURATION - MUSE: 106 MS
QT - MUSE: 440 MS
QTC - MUSE: 424 MS
R AXIS - MUSE: -29 DEGREES
SYSTOLIC BLOOD PRESSURE - MUSE: NORMAL MMHG
T AXIS - MUSE: 53 DEGREES
VENTRICULAR RATE- MUSE: 56 BPM

## 2025-08-26 ENCOUNTER — HOSPITAL ENCOUNTER (OUTPATIENT)
Dept: CARDIOLOGY | Facility: OTHER | Age: 45
Discharge: HOME | End: 2025-08-26
Attending: FAMILY MEDICINE
Payer: COMMERCIAL

## 2025-08-26 DIAGNOSIS — R00.0 RACING HEART BEAT: ICD-10-CM

## 2025-08-26 DIAGNOSIS — R00.2 HEART PALPITATIONS: ICD-10-CM

## 2025-08-26 LAB — LVEF ECHO: NORMAL

## 2025-08-26 PROCEDURE — 93306 TTE W/DOPPLER COMPLETE: CPT

## 2025-09-03 LAB — CV ZIO PRELIM RESULTS: NORMAL

## (undated) DEVICE — DRSG STERI STRIP 1/2X4" R1547

## (undated) DEVICE — DRSG ABDOMINAL PAD UNSTERILE 5X9" 9190

## (undated) DEVICE — GLOVE PROTEXIS BLUE W/NEU-THERA 6.5  2D73EB65

## (undated) DEVICE — PAD ABD 5X9" STERILE 9190A

## (undated) DEVICE — SU VICRYL 0 CT-1 36" J346H

## (undated) DEVICE — SUTURE PLAIN GUT 3-0 CT-1 842H

## (undated) DEVICE — ESU GROUND PAD ADULT W/CORD E7507

## (undated) DEVICE — PREP CHLORAPREP 26ML TINTED ORANGE  260815

## (undated) DEVICE — Device

## (undated) DEVICE — SOL WATER 1500ML

## (undated) DEVICE — GLOVE PROTEXIS POWDER FREE SMT 6.5  2D72PT65X

## (undated) DEVICE — SLEEVE COMPRESSION SCD KNEE MED 74022

## (undated) DEVICE — DRSG MEDIPORE 3 1/2X8" 3570

## (undated) DEVICE — PACK C SECTION SMA15CSFCA

## (undated) DEVICE — CATH TRAY FOLEY SURESTEP 16FR W/URINE MTR STATLK LF A303416A

## (undated) DEVICE — PAD CHUX UNDERPAD 30X36" P3036C

## (undated) DEVICE — COVER LIGHT HANDLE LT-F02

## (undated) DEVICE — SU MONOCRYL 0 CT-1 36" UND Y946H

## (undated) DEVICE — LIGHT HANDLES PLASTIC

## (undated) DEVICE — SU VICRYL 4-0 KS 27" UND J662H

## (undated) RX ORDER — ACETAMINOPHEN 10 MG/ML
INJECTION, SOLUTION INTRAVENOUS
Status: DISPENSED
Start: 2021-01-14

## (undated) RX ORDER — SODIUM CHLORIDE, SODIUM LACTATE, POTASSIUM CHLORIDE, CALCIUM CHLORIDE 600; 310; 30; 20 MG/100ML; MG/100ML; MG/100ML; MG/100ML
INJECTION, SOLUTION INTRAVENOUS
Status: DISPENSED
Start: 2021-01-14

## (undated) RX ORDER — ACETAMINOPHEN 500 MG
TABLET ORAL
Status: DISPENSED
Start: 2021-08-27

## (undated) RX ORDER — LIDOCAINE HYDROCHLORIDE 10 MG/ML
INJECTION, SOLUTION EPIDURAL; INFILTRATION; INTRACAUDAL; PERINEURAL
Status: DISPENSED
Start: 2018-08-31

## (undated) RX ORDER — PHENYLEPHRINE HYDROCHLORIDE 10 MG/ML
INJECTION INTRAVENOUS
Status: DISPENSED
Start: 2021-01-14

## (undated) RX ORDER — ONDANSETRON 2 MG/ML
INJECTION INTRAMUSCULAR; INTRAVENOUS
Status: DISPENSED
Start: 2021-01-14

## (undated) RX ORDER — SODIUM CHLORIDE 9 MG/ML
INJECTION, SOLUTION INTRAVENOUS
Status: DISPENSED
Start: 2018-08-31

## (undated) RX ORDER — SODIUM CHLORIDE 9 MG/ML
INJECTION, SOLUTION INTRAVENOUS
Status: DISPENSED
Start: 2021-01-14

## (undated) RX ORDER — OXYTOCIN 10 [USP'U]/ML
INJECTION, SOLUTION INTRAMUSCULAR; INTRAVENOUS
Status: DISPENSED
Start: 2021-01-14

## (undated) RX ORDER — MORPHINE SULFATE 0.5 MG/ML
INJECTION, SOLUTION EPIDURAL; INTRATHECAL; INTRAVENOUS
Status: DISPENSED
Start: 2021-01-14

## (undated) RX ORDER — LIDOCAINE HYDROCHLORIDE 20 MG/ML
INJECTION, SOLUTION EPIDURAL; INFILTRATION; INTRACAUDAL; PERINEURAL
Status: DISPENSED
Start: 2018-08-31

## (undated) RX ORDER — BUPIVACAINE HYDROCHLORIDE 7.5 MG/ML
INJECTION, SOLUTION INTRASPINAL
Status: DISPENSED
Start: 2018-09-01

## (undated) RX ORDER — CEFAZOLIN SODIUM 1 G/3ML
INJECTION, POWDER, FOR SOLUTION INTRAMUSCULAR; INTRAVENOUS
Status: DISPENSED
Start: 2018-09-01

## (undated) RX ORDER — KETOROLAC TROMETHAMINE 30 MG/ML
INJECTION, SOLUTION INTRAMUSCULAR; INTRAVENOUS
Status: DISPENSED
Start: 2021-01-14

## (undated) RX ORDER — MORPHINE SULFATE 1 MG/ML
INJECTION, SOLUTION EPIDURAL; INTRATHECAL; INTRAVENOUS
Status: DISPENSED
Start: 2018-09-01

## (undated) RX ORDER — DEXAMETHASONE SODIUM PHOSPHATE 4 MG/ML
INJECTION, SOLUTION INTRA-ARTICULAR; INTRALESIONAL; INTRAMUSCULAR; INTRAVENOUS; SOFT TISSUE
Status: DISPENSED
Start: 2021-01-14

## (undated) RX ORDER — LIDOCAINE HYDROCHLORIDE 20 MG/ML
INJECTION, SOLUTION EPIDURAL; INFILTRATION; INTRACAUDAL; PERINEURAL
Status: DISPENSED
Start: 2018-09-01

## (undated) RX ORDER — ONDANSETRON 2 MG/ML
INJECTION INTRAMUSCULAR; INTRAVENOUS
Status: DISPENSED
Start: 2018-09-01

## (undated) RX ORDER — OXYTOCIN 10 [USP'U]/ML
INJECTION, SOLUTION INTRAMUSCULAR; INTRAVENOUS
Status: DISPENSED
Start: 2018-09-01

## (undated) RX ORDER — BUPIVACAINE HYDROCHLORIDE 2.5 MG/ML
INJECTION, SOLUTION EPIDURAL; INFILTRATION; INTRACAUDAL
Status: DISPENSED
Start: 2021-01-14

## (undated) RX ORDER — SODIUM CHLORIDE 9 MG/ML
INJECTION, SOLUTION INTRAVENOUS
Status: DISPENSED
Start: 2018-09-01